# Patient Record
Sex: MALE | Race: BLACK OR AFRICAN AMERICAN | NOT HISPANIC OR LATINO | Employment: OTHER | URBAN - METROPOLITAN AREA
[De-identification: names, ages, dates, MRNs, and addresses within clinical notes are randomized per-mention and may not be internally consistent; named-entity substitution may affect disease eponyms.]

---

## 2020-07-08 ENCOUNTER — BMR PREADMISSION ASSESSMENT (OUTPATIENT)
Dept: ADMISSIONS | Facility: REHABILITATION | Age: 70
End: 2020-07-08

## 2020-07-09 PROBLEM — M48.00 SPINAL STENOSIS: Status: ACTIVE | Noted: 2020-07-09

## 2020-07-09 PROBLEM — M50.30 DDD (DEGENERATIVE DISC DISEASE), CERVICAL: Status: ACTIVE | Noted: 2020-07-09

## 2020-07-09 PROBLEM — M19.90 DJD (DEGENERATIVE JOINT DISEASE): Status: ACTIVE | Noted: 2020-07-09

## 2020-07-09 PROBLEM — I10 HTN (HYPERTENSION): Status: ACTIVE | Noted: 2020-07-09

## 2020-07-09 PROBLEM — G47.33 OSA (OBSTRUCTIVE SLEEP APNEA): Status: ACTIVE | Noted: 2020-07-09

## 2020-07-09 LAB
BUN SERPL-MCNC: 14 MG/DL
CHLORIDE SERPL-SCNC: 108 MEQ/L
CO2 SERPL-SCNC: 25 MEQ/L
CREAT SERPL-MCNC: 1 MG/DL
EXTERNAL HEMATOCRIT: 31 %
EXTERNAL HEMOGLOBIN: 10.9 G/DL
EXTERNAL PLATELET COUNT: 166 K/ΜL
EXTERNAL PT (PROTIME): 14
EXTERNAL WBC: 11.4 G/DL
GLUCOSE SERPL-MCNC: 122 MG/DL
INR PPP: 1.2
POTASSIUM SERPL-SCNC: 4.6 MEQ/L
SODIUM SERPL-SCNC: 140 MEQ/L

## 2020-07-09 RX ORDER — HYDROMORPHONE HYDROCHLORIDE 2 MG/1
2 TABLET ORAL EVERY 4 HOURS PRN
COMMUNITY
End: 2020-07-24 | Stop reason: HOSPADM

## 2020-07-09 RX ORDER — CYCLOBENZAPRINE HCL 10 MG
10 TABLET ORAL 3 TIMES DAILY PRN
COMMUNITY
End: 2020-07-24 | Stop reason: HOSPADM

## 2020-07-09 RX ORDER — DOCUSATE SODIUM 100 MG/1
100 CAPSULE, LIQUID FILLED ORAL 2 TIMES DAILY
COMMUNITY
End: 2020-07-24 | Stop reason: HOSPADM

## 2020-07-09 RX ORDER — TAMSULOSIN HYDROCHLORIDE 0.4 MG/1
0.4 CAPSULE ORAL NIGHTLY
COMMUNITY

## 2020-07-09 RX ORDER — GABAPENTIN 100 MG/1
200 CAPSULE ORAL 3 TIMES DAILY
COMMUNITY
End: 2020-07-24 | Stop reason: HOSPADM

## 2020-07-09 RX ORDER — HEPARIN SODIUM 5000 [USP'U]/ML
INJECTION, SOLUTION INTRAVENOUS; SUBCUTANEOUS
COMMUNITY
End: 2020-07-24 | Stop reason: HOSPADM

## 2020-07-09 RX ORDER — ACETAMINOPHEN 500 MG
1000 TABLET ORAL EVERY 6 HOURS PRN
COMMUNITY
End: 2020-07-24 | Stop reason: HOSPADM

## 2020-07-09 RX ORDER — POLYETHYLENE GLYCOL 3350 17 G/17G
17 POWDER, FOR SOLUTION ORAL DAILY
COMMUNITY
End: 2020-07-24 | Stop reason: HOSPADM

## 2020-07-09 RX ORDER — ROSUVASTATIN CALCIUM 5 MG/1
5 TABLET, COATED ORAL DAILY
COMMUNITY

## 2020-07-09 RX ORDER — BISACODYL 10 MG/1
10 SUPPOSITORY RECTAL DAILY
COMMUNITY
End: 2020-07-24 | Stop reason: HOSPADM

## 2020-07-09 NOTE — HOSPITAL COURSE
Jael is a 70 y.o. male 3 Days Post-Op s/p 7/6 L3-5 posterior lumbar decompression, L4-5 posterior lumbar fusion with instrumentation    Medications      Pain -  Tylenol, Dilaudid 2-4mg q4H, Flexeril PRN    Bowel -   Colace, Miralax PRN    DVT ppx -  SCDs; continue prophylactic SQH while inpatient    GI ppx -  ---    Antibiotics -  Vancomycin x 24hr    Transfusion -  ---    Activity  Weight bearing as tolerated with LSO brace (OK for OOB prior to LSO brace   arrival)                [facility]  Appreciate hospitalist recs   F/u lab   + 2x Juan Jose drain (#1: 120 last 24hr; #2: 180cc last 24hr)         #BEN on CPAP   - prescribed CPAP at home, although not using currently   - monitor for apneas and desats at night   - O2 sats were okay last night   - if O2 sats become an issue place on CPAP at night     prostate ca w/ urinary urgency   - follows w/ urology at [place]   - continue home tamsulosin   HLD   - continue home rosuvastatin     s/p lumbar decompression and fusion   - dvt ppx and pain management per ortho        Deep drain removed Sun 7/12   Superficial drain still in (35cc last 12 hr)   Appreciate Gen Surg recs -- no SBO, continue aggressive bowel regimen   Appreciate PM&R rec for acute rehab   Continue aggressive bowel regimen    Disposition  Pending Pain / PT / drain   Possibly remove drain later today then d/c to acute rehab        He is currently AAO X 3 part with therapy rec for acute rehab ready today post drain removal

## 2020-07-10 LAB
BUN SERPL-MCNC: 15 MG/DL
CHLORIDE SERPL-SCNC: 9 MEQ/L
CO2 SERPL-SCNC: 26 MEQ/L
CREAT SERPL-MCNC: 0.9 MG/DL
EXTERNAL HEMATOCRIT: 29 %
EXTERNAL HEMOGLOBIN: 10.1 G/DL
EXTERNAL PLATELET COUNT: 213 K/ΜL
EXTERNAL WBC: 8.3 G/DL
GLUCOSE SERPL-MCNC: 118 MG/DL
POTASSIUM SERPL-SCNC: 3.8 MEQ/L
SODIUM SERPL-SCNC: 141 MEQ/L

## 2020-07-13 VITALS
WEIGHT: 197 LBS | HEIGHT: 71 IN | HEART RATE: 69 BPM | DIASTOLIC BLOOD PRESSURE: 75 MMHG | BODY MASS INDEX: 27.58 KG/M2 | SYSTOLIC BLOOD PRESSURE: 158 MMHG | TEMPERATURE: 98.4 F | OXYGEN SATURATION: 96 %

## 2020-07-13 RX ORDER — ALUMINUM HYDROXIDE, MAGNESIUM HYDROXIDE, AND SIMETHICONE 1200; 120; 1200 MG/30ML; MG/30ML; MG/30ML
SUSPENSION ORAL EVERY 6 HOURS PRN
COMMUNITY
End: 2020-07-24 | Stop reason: HOSPADM

## 2020-07-13 RX ORDER — ALBUTEROL SULFATE 90 UG/1
2 INHALANT RESPIRATORY (INHALATION) EVERY 6 HOURS PRN
COMMUNITY
End: 2020-07-24 | Stop reason: HOSPADM

## 2020-07-14 ENCOUNTER — HOSPITAL ENCOUNTER (INPATIENT)
Facility: REHABILITATION | Age: 70
LOS: 10 days | Discharge: HOME HEALTH | DRG: 560 | End: 2020-07-24
Attending: PHYSICAL MEDICINE & REHABILITATION | Admitting: PHYSICAL MEDICINE & REHABILITATION
Payer: MEDICARE

## 2020-07-14 DIAGNOSIS — F43.23 ADJUSTMENT DISORDER WITH MIXED ANXIETY AND DEPRESSED MOOD: ICD-10-CM

## 2020-07-14 DIAGNOSIS — R00.0 TACHYCARDIA: ICD-10-CM

## 2020-07-14 DIAGNOSIS — R11.0 NAUSEA: Primary | ICD-10-CM

## 2020-07-14 PROBLEM — Z98.1 S/P LUMBAR SPINAL FUSION: Status: ACTIVE | Noted: 2020-07-14

## 2020-07-14 PROBLEM — N40.1 BENIGN PROSTATIC HYPERPLASIA WITH LOWER URINARY TRACT SYMPTOMS: Chronic | Status: ACTIVE | Noted: 2020-07-14

## 2020-07-14 PROCEDURE — 63700000 HC SELF-ADMINISTRABLE DRUG: Performed by: HOSPITALIST

## 2020-07-14 PROCEDURE — 12800001 HC ROOM AND CARE SEMIPRIVATE REHAB-BRAIN INJ

## 2020-07-14 PROCEDURE — 63600000 HC DRUGS/DETAIL CODE: Performed by: HOSPITALIST

## 2020-07-14 RX ORDER — CYCLOBENZAPRINE HCL 10 MG
10 TABLET ORAL 3 TIMES DAILY PRN
Status: DISCONTINUED | OUTPATIENT
Start: 2020-07-14 | End: 2020-07-24 | Stop reason: HOSPADM

## 2020-07-14 RX ORDER — ACETAMINOPHEN 325 MG/1
650 TABLET ORAL EVERY 4 HOURS PRN
Status: DISCONTINUED | OUTPATIENT
Start: 2020-07-14 | End: 2020-07-24 | Stop reason: HOSPADM

## 2020-07-14 RX ORDER — GABAPENTIN 100 MG/1
200 CAPSULE ORAL 3 TIMES DAILY
Status: DISCONTINUED | OUTPATIENT
Start: 2020-07-14 | End: 2020-07-14

## 2020-07-14 RX ORDER — TAMSULOSIN HYDROCHLORIDE 0.4 MG/1
0.4 CAPSULE ORAL NIGHTLY
Status: DISCONTINUED | OUTPATIENT
Start: 2020-07-14 | End: 2020-07-24 | Stop reason: HOSPADM

## 2020-07-14 RX ORDER — PANTOPRAZOLE SODIUM 40 MG/1
40 TABLET, DELAYED RELEASE ORAL
Status: DISCONTINUED | OUTPATIENT
Start: 2020-07-15 | End: 2020-07-24 | Stop reason: HOSPADM

## 2020-07-14 RX ORDER — POLYETHYLENE GLYCOL 3350 17 G/17G
17 POWDER, FOR SOLUTION ORAL DAILY PRN
Status: DISCONTINUED | OUTPATIENT
Start: 2020-07-14 | End: 2020-07-24 | Stop reason: HOSPADM

## 2020-07-14 RX ORDER — LOSARTAN POTASSIUM 50 MG/1
50 TABLET ORAL DAILY
COMMUNITY

## 2020-07-14 RX ORDER — POLYETHYLENE GLYCOL 3350 17 G/17G
17 POWDER, FOR SOLUTION ORAL DAILY
Status: DISCONTINUED | OUTPATIENT
Start: 2020-07-15 | End: 2020-07-23

## 2020-07-14 RX ORDER — CALCIUM CARBONATE 200(500)MG
1000 TABLET,CHEWABLE ORAL 3 TIMES DAILY PRN
Status: DISCONTINUED | OUTPATIENT
Start: 2020-07-14 | End: 2020-07-24 | Stop reason: HOSPADM

## 2020-07-14 RX ORDER — ALBUTEROL SULFATE 90 UG/1
2 INHALANT RESPIRATORY (INHALATION) EVERY 6 HOURS PRN
Status: DISCONTINUED | OUTPATIENT
Start: 2020-07-14 | End: 2020-07-24 | Stop reason: HOSPADM

## 2020-07-14 RX ORDER — BISACODYL 10 MG/1
10 SUPPOSITORY RECTAL DAILY PRN
Status: DISCONTINUED | OUTPATIENT
Start: 2020-07-14 | End: 2020-07-24 | Stop reason: HOSPADM

## 2020-07-14 RX ORDER — ROSUVASTATIN CALCIUM 5 MG/1
5 TABLET, COATED ORAL DAILY
Status: DISCONTINUED | OUTPATIENT
Start: 2020-07-15 | End: 2020-07-24 | Stop reason: HOSPADM

## 2020-07-14 RX ORDER — LOSARTAN POTASSIUM 50 MG/1
50 TABLET ORAL DAILY
Status: DISCONTINUED | OUTPATIENT
Start: 2020-07-15 | End: 2020-07-24 | Stop reason: HOSPADM

## 2020-07-14 RX ORDER — ACETAMINOPHEN 500 MG
1000 TABLET ORAL EVERY 8 HOURS
Status: DISCONTINUED | OUTPATIENT
Start: 2020-07-14 | End: 2020-07-14

## 2020-07-14 RX ORDER — HEPARIN SODIUM 5000 [USP'U]/ML
5000 INJECTION, SOLUTION INTRAVENOUS; SUBCUTANEOUS EVERY 8 HOURS
Status: DISCONTINUED | OUTPATIENT
Start: 2020-07-14 | End: 2020-07-24 | Stop reason: HOSPADM

## 2020-07-14 RX ORDER — CHOLECALCIFEROL (VITAMIN D3) 25 MCG
1000 TABLET ORAL DAILY
Status: DISCONTINUED | OUTPATIENT
Start: 2020-07-15 | End: 2020-07-24 | Stop reason: HOSPADM

## 2020-07-14 RX ORDER — AMOXICILLIN 250 MG
1 CAPSULE ORAL 2 TIMES DAILY
Status: DISCONTINUED | OUTPATIENT
Start: 2020-07-14 | End: 2020-07-23

## 2020-07-14 RX ORDER — FAMOTIDINE 20 MG/1
20 TABLET, FILM COATED ORAL 2 TIMES DAILY
Status: DISCONTINUED | OUTPATIENT
Start: 2020-07-14 | End: 2020-07-24 | Stop reason: HOSPADM

## 2020-07-14 RX ORDER — HYDROMORPHONE HYDROCHLORIDE 2 MG/1
2-4 TABLET ORAL EVERY 4 HOURS PRN
Status: DISCONTINUED | OUTPATIENT
Start: 2020-07-14 | End: 2020-07-24 | Stop reason: HOSPADM

## 2020-07-14 RX ORDER — ALUMINUM HYDROXIDE, MAGNESIUM HYDROXIDE, AND SIMETHICONE 1200; 120; 1200 MG/30ML; MG/30ML; MG/30ML
30 SUSPENSION ORAL EVERY 6 HOURS PRN
Status: DISCONTINUED | OUTPATIENT
Start: 2020-07-14 | End: 2020-07-24 | Stop reason: HOSPADM

## 2020-07-14 RX ADMIN — HEPARIN SODIUM 5000 UNITS: 5000 INJECTION INTRAVENOUS; SUBCUTANEOUS at 21:35

## 2020-07-14 RX ADMIN — TAMSULOSIN HYDROCHLORIDE 0.4 MG: 0.4 CAPSULE ORAL at 21:34

## 2020-07-14 RX ADMIN — FAMOTIDINE 20 MG: 20 TABLET ORAL at 21:34

## 2020-07-14 RX ADMIN — HYDROMORPHONE HYDROCHLORIDE 4 MG: 2 TABLET ORAL at 21:34

## 2020-07-14 ASSESSMENT — COGNITIVE AND FUNCTIONAL STATUS - GENERAL: DO YOU HAVE SERIOUS DIFFICULTY WALKING OR CLIMBING STAIRS: AMBULATION DIFFICULTY, ASSISTANCE 1 PERSON

## 2020-07-14 NOTE — BMR PREADMISSION NOTE
[facility]  [facility] Assessment    Patient Name: Jael Herman  YOB: 1950    Referral Date: 07/09/20  Evaluation Date: 07/14/20  Referring Facility Admission Date: 07/06/20  Referring Facility: [facility]   ReferringProvider: Gregorio Lancaster MD     Reason for Referral: Jael Herman is a 70 y.o. female whose primary indication for inpatient rehabilitation is Spinal Cord, Non-Traumatic.   Pertinent History of Current Functional Problem:  Jael is a 70 y.o. male 3 Days Post-Op s/p 7/6 L3-5 posterior lumbar decompression, L4-5 posterior lumbar fusion with instrumentation    Medications      Pain -  Tylenol, Dilaudid 2-4mg q4H, Flexeril PRN    Bowel -   Colace, Miralax PRN    DVT ppx -  SCDs; continue prophylactic SQH while inpatient    GI ppx -  ---    Antibiotics -  Vancomycin x 24hr    Transfusion -  ---    Activity  Weight bearing as tolerated with LSO brace (OK for OOB prior to LSO brace   arrival)                Misc  Appreciate hospitalist recs   F/u lab   + 2x Juan Jose drain (#1: 120 last 24hr; #2: 180cc last 24hr)         #BEN on CPAP   - prescribed CPAP at home, although not using currently   - monitor for apneas and desats at night   - O2 sats were okay last night   - if O2 sats become an issue place on CPAP at night     prostate ca w/ urinary urgency   - follows w/ urology at [place]   - continue home tamsulosin   HLD   - continue home rosuvastatin     s/p lumbar decompression and fusion   - dvt ppx and pain management per ortho        Deep drain removed Sun 7/12   Superficial drain still in (35cc last 12 hr)   Appreciate Gen Surg recs -- no SBO, continue aggressive bowel regimen   Appreciate PM&R rec for acute rehab   Continue aggressive bowel regimen    Disposition  Pending Pain / PT / drain   Possibly remove drain later today then d/c to acute rehab        He is currently AAO X 3 part with therapy rec for acute rehab ready today post drain  removal       Active Medical Conditions:  Patient Active Problem List   Diagnosis   • Spinal stenosis   • HTN (hypertension)   • DJD (degenerative joint disease)   • DDD (degenerative disc disease), cervical   • BEN (obstructive sleep apnea)       Active Medications:  Active Medications   Medication Sig Dispense Refill   • acetaminophen (TYLENOL) 500 mg tablet Take 1,000 mg by mouth every 6 (six) hours as needed for mild pain.     • albuterol HFA (VENTOLIN HFA) 90 mcg/actuation inhaler Inhale 2 puffs every 6 (six) hours as needed for wheezing.     • alum-mag hydroxide-simeth (MAALOX) 200-200-20 mg/5 mL suspension Take by mouth every 6 (six) hours as needed for heartburn.     • bisacodyL (DULCOLAX) 10 mg suppository Insert 10 mg into the rectum daily.     • cyclobenzaprine (FLEXERIL) 10 mg tablet Take 10 mg by mouth 3 (three) times a day as needed for muscle spasms.     • docusate sodium (COLACE) 100 mg capsule Take 100 mg by mouth 2 (two) times a day.     • gabapentin (NEURONTIN) 100 mg capsule Take 200 mg by mouth 3 (three) times a day.     • heparin sodium,porcine (HEPARIN, PORCINE,) 5,000 unit/mL syringe Inject as directed.     • HYDROmorphone (DILAUDID) 2 mg tablet Take 2 mg by mouth every 4 (four) hours as needed for moderate pain.     • ipratropium-albuteroL (COMBIVENT RESPIMAT)  mcg/actuation inhaler Inhale 1 puff 4 (four) times a day.     • polyethylene glycol (MIRALAX) 17 gram packet Take 17 g by mouth daily.     • rosuvastatin (CRESTOR) 5 mg tablet Take 5 mg by mouth daily.     • tamsulosin (FLOMAX) 0.4 mg capsule Take 0.4 mg by mouth nightly.     No medication comments found.    HISTORY:    Past Medical History:   Diagnosis Date   • DJD (degenerative joint disease)    • Hypertension    • BEN (obstructive sleep apnea)      No past surgical history on file.  Tobacco Use as of 7/8/2020     Smoking Status Smoking Start Date Smoking Quit Date Packs/Day Years Used    Never Assessed -- -- -- --    Types  Comments Smokeless Tobacco Status Smokeless Tobacco Quit Date Source    -- -- Unknown -- --             Allergies  Allergies   Allergen Reactions   • Amoxicillin    • Iodinated Contrast Media    • Simvastatin    • Tree Pollen-Luci             Premorbid Functional Status:   Dominant Hand: right  Ambulation: independent  Transferring: independent  Toileting: independent  Bathing: independent  Dressing: independent  Eating: independent  Communication: understands/communicates without difficulty  Swallowing: swallows foods/liquids without difficulty  Baseline Diet/Method of Nutritional Intake: thin liquids;regular solids  Equipment Currently Used at Home: none  Prior Level of Function Comment: brice has h/o drop foot on the rle            Living Environment:  Lives With: spouse  Name(s) of Who Lives With Patient: Renata [phone]  Living Arrangements: house  Home Accessibility: stair glide  Number of Stairs, Main Entrance: 0  Number of Stairs, Within Home, Primary: 4      TEST RESULTS:     Chemistry (Up to last 3 results from the past 720 hours)      07/09 07/10    Sodium       140          141       Potassium       4.6          3.8       BUN       14          15       Creatinine       1.0          0.9       Glucose       122          118       CO2       25          26       Chloride       108          9         Hepatic (Up to last 3 results from the past 720 hours)    None      Metabolic (Up to last 3 results from the past 720 hours)    None      Hematologic (Up to last 3 results from the past 720 hours)      07/09 07/10    WBC       11.4          8.3       Hemoglobin       10.9          10.1       Hematocrit       31          29       Platelets       166          213       Protime       14.0          --       INR       1.2          --         Other (Up to last 3 results from the past 720 hours)      07/09    INR       1.2            Diagnostics  Diagnostics: X-ray  X-ray Date: 07/13/20  X-ray Result: Contrast  has progressed to the colon. No evidence of bowel obstruction    ASSESSMENT:       Vitals:           Lines/Drains/Airways:   Lines, Drains & Airways  Lines, Drains & Airways: Drain  Drain Insertion Date: 07/06/20  Drain Comments: 1 ellis drain   Incision Date: 07/06/20  Incision Comments: c/d/istaples         Risk for Clinical Complications:  DVT: Moderate  Falls: Moderate         Precautions:  No data recorded         Current Diet:   Diet: thin liquids, regular solids         Current Functional Status:  Preadmission Current Function     Row Name 07/14/20 1100       Cognition/Psychosocial    Comment, Cognition  wnl       Motor Speech    Comment, Motor Speech Assessment  wnl       Auditory Comprehension    Comment, Assessment (Auditory Comprehension)  wnl       Verbal Expression    Comment, Assesment (Verbal Expression)  wnl       Swallowing Recommendations    Comment, Swallowing Recommendations  reg /thins       Basic Activities of Daily Living (BADLs)    Basic Activities of Daily Living  -- Min a UB and Mod a LB dressing & grooming       Bed Mobility    Comment (Bed Mobility)  min a x1       Transfers    Comment  MIn a X 1 with rw        Bed to Chair Transfer    Comment  Min a X 1 RW        Chair to Bed Transfer    Comment  Min a X 1 rw        Sit to Stand Transfer    Comment  Min a X 1 RW & HHA        Stand to Sit Transfer    Comment  Min a X 1 HHA        Gait Training    Comment  min a X 1 with RW X 1 amb 10 ft X 2 decreased gait speed, decreased step length, decrreased nba               Support System:   Designated Primary Caregiver: wife  Availability, Primary Caregiver: available most of time, some coverage needed  Health, Primary Caregiver: other (see comments) (wife has MS)  Physical Limitations, Primary Caregiver: can supervise activity and provide some assistance  Caregiver Engagement: advocates for the patient           Patient/Family Goals:   Patient's Goals For Discharge: return to all previous  roles/activities  Family Goals For Discharge: patient able to return to all previous activities/roles           Educational Background:      RECOMMENDATIONS / PLAN:       Special Needs:            Plan:  Identified Referral Needs: occupational therapy, physical therapy  OT Frequency: 5-7 times per week  OT Intensity: 1.5 hours  PT Frequency: 5-7 times per week  PT Intensity: 1.5 hours  Impairments to be addressed: safety, self-care, mobility    Medical Necessity Admission Criteria: other active medical conditions (see comments)(ddd, htn, iglesia)  Therapy Intensity: Requires, can tolerate and will benefit from 3 hours of therapy at least 5 days per week  Projected Length of Stay (days): 10 days  Patient is willing to participate in rehab program: yes         Expected Level of Function at Discharge:  Expected Functional Improvement: safety;self-care;mobility  Self-Care: Independent  Sphincter Control: Independent  Transfers: Independent  Locomotion: Independent  Communication: Independent  Social Cognition: Independent           Post-Discharge Needs:  Anticipated Discharge Disposition: home with outpatient services  Type of Outpatient Services: physical therapy

## 2020-07-14 NOTE — H&P
Patient Name: Jael Herman  MRN: [id number]  : 1950  Admission Date: 2020    Chief Complaint:    Dysfunction in ambulation, transfers and self-care status post posterior lumbar decompression and fusion, lumbar stenosis, lumbar radiculopathy. (Patient was admitted to [facility] on 20. Records reviewed on 20. Patient evaluated on 20 at about 6:45 PM. Full H&P done on 20. Orders put in CPOE on 20. Plan of care discussed with patient.)    History of Present Illness:    Mr. Jael Herman  is a 70-year-old right-handed -American gentleman with chronic conditions significant for hypertension, hyperlipidemia, benign process hypertrophy, sleep apnea, lumbar stenosis who had chronic low back pain for over 20 years and pain extending down his lower extremities especially right lower extremity with progressive weakness in his right lower extremity over the past year and he says he was noted to have a right foot drop.  He was diagnosed with lumbar spinal stenosis and underwent L3-5 PLDF by Dr. Gregorio Lancaster on 20 at [facility].  Postoperatively he is allowed weightbearing as tolerated on both lower extremities.  He was recommended a LSO brace for use when out of bed.  He did have urinary frequency postoperatively.  Drains were removed on 20.  Postoperative course was significant for abdominal pain and he reports multiple imaging studies were done of his abdomen and he also had barium follow-through.  He was seen by surgical team for his abdominal pain.  He reports abdominal x-rays were negative for obstruction.  Amylase, lipase were not elevated.  He says that when he takes Gabapentin he gets abdominal pain and he noticed that even before his surgery.  We will hold that for now.  Also he will be kept on Protonix/Pepcid.  He is on subcutaneous Heparin for DVT prophylaxis.  He is also on Dilaudid and Flexeril for pain control. He  has been kept on bowel medications for his constipation issues.  He still reports ongoing abdominal pain and I mentioned to him that we will get another x-ray of his abdomen tomorrow.  If his abdominal pain gets worse, he may need to go to the ER for evaluation. On 7/10/20, hemoglobin was 10.1, WBC count 8.3, BUN 15, creatinine 0.9.  He has been needing assistance for mobility, transfers, self-care postoperatively. I have reviewed the preadmission screening and concur with that information and there are no significant changes from patient’s preadmission screening. He is transferred to [place] [facility] on 7/14/20 for further rehabilitation care.       Past Medical History:    Past Medical History:   Diagnosis Date   • BPH (benign prostatic hyperplasia)    • DJD (degenerative joint disease)    • Hypertension    • Lumbar stenosis    • BEN (obstructive sleep apnea)        Past Surgical History:    Past Surgical History:   Procedure Laterality Date   • POSTERIOR FUSION LUMBAR SPINE  07/06/2020    L3-5 PLDF       Medications:    Current Facility-Administered Medications   Medication Dose Route Frequency   • acetaminophen  650 mg oral q4h PRN   • albuterol HFA  2 puff inhalation q6h PRN   • alum-mag hydroxide-simeth  30 mL oral q6h PRN   • bisacodyL  10 mg rectal Daily PRN   • calcium carbonate  1,000 mg oral 3x daily PRN   • [START ON 7/15/2020] cholecalciferol (vitamin D3)  1,000 Units oral Daily   • cyclobenzaprine  10 mg oral 3x daily PRN   • famotidine  20 mg oral BID   • heparin (porcine)  5,000 Units subcutaneous q8h CARRIE   • HYDROmorphone  2-4 mg oral q4h PRN   • [START ON 7/15/2020] losartan  50 mg oral Daily   • [START ON 7/15/2020] pantoprazole  40 mg oral Daily before breakfast   • [START ON 7/15/2020] polyethylene glycol  17 g oral Daily   • polyethylene glycol  17 g oral Daily PRN   • [START ON 7/15/2020] rosuvastatin  5 mg oral Daily   • sennosides-docusate sodium  1 tablet oral BID   • tamsulosin   "0.4 mg oral Nightly       Allergies:    Allergies   Allergen Reactions   • Amoxicillin    • Iodinated Contrast Media    • Simvastatin    • Tree Pollen-Luci        Family History:    History reviewed. No pertinent family history.    Social History:    The patient is  and lives with his wife in a 2 level home. 3 steps to enter, bedroom and bathroom on the second level, but he says he has a first floor setup available.  He is retired and was working as a counselor.  He denies smoking, or drinking alcohol and denies using recreational drugs.    Functional History:    The patient is a right-hand dominant person. He was independent in ADLs and ambulation prior to his hospitalization. He drives a car.    Review Of Systems:    A complete and comprehensive review of systems was performed. The patient denies any chest pain, shortness of breath. He indicates last bowel movement was today.  He does have difficulty with sleep.  He has had some abdominal pain since his surgery.  Multiple imaging studies were done to his abdomen and he was followed by the surgical team in the acute care hospital.  He still reports some abdominal pain and we will get x-ray tomorrow.  He has some pain in his back.  He has right ankle dorsiflexor weakness.  He was given a back brace for use when out of bed.  I discussed spinal precautions with him.  Otherwise, a complete and comprehensive review of systems is negative      Physical Examination:    Pulse (!) 51, temperature 36.7 °C (98 °F), temperature source Oral, resp. rate 18, height 1.803 m (5' 11\"), weight 81.6 kg (180 lb), SpO2 99 %.    Body mass index is 25.1 kg/m².    General Appearance: Not in acute distress  Head/Ear/Nose/Throat: Normocephalic; Atraumatic.   Eye: EOMI; PERRL.   Neck: No JVD; No Bruits.   Respiratory: Decreased breath sounds at bases.   Cardiovascular: RRR; Normal S1, S2.   Gastrointestinal: Soft; NT; +BS.   Extremities: Bilateral lower extremity edema noted.  " Incision lumbar spine covered by dressing.  Musculoskeletal: Functional active range of motion in both upper extremities.  He is able to do active straight leg raise in both lower extremities but has weakness in right ankle dorsiflexion and he says he has a known right foot drop.  Neurological: AAO ×3. Speech is fluent. Cranial nerve examination does not reveal any gross facial asymmetry. Strength testing about 4+/5 strength in both upper extremities.  Bilateral hip flexion is 4/5.  Bilateral quadriceps are 4/5.  Right ankle dorsiflexion is 2+/5.  Right ankle plantar flexion is 4+/5.  Left ankle dorsi and plantar flexion of 4+/5.  He is grossly able to localize touch and position sense in great toes but has some numbness in his legs and feet.  Deep tendon reflexes are hypoactive and symmetric bilaterally.  Plantars are flexor.  Coordination is functional upper extremities.    Behavior/Emotional: Appropriate; Cooperative.   Skin: No obvious rashes noted.  Incision lumbar spine covered with dressing.      Assessment and Plan:    ASSESSMENT PLAN:  1. 70-year-old right-handed -American gentleman with chronic conditions significant for hypertension, hyperlipidemia, benign process hypertrophy, sleep apnea, lumbar stenosis who had chronic low back pain for over 20 years and pain extending down his lower extremities especially right lower extremity with progressive weakness in his right lower extremity over the past year and he says he was noted to have a right foot drop.  He was diagnosed with lumbar spinal stenosis and underwent L3-5 PLDF by Dr. Gregorio Lancaster on 7/6/20 at [facility].  Postoperatively he is allowed weightbearing as tolerated on both lower extremities.  He was recommended a LSO brace for use when out of bed.  He did have urinary frequency postoperatively.  Drains were removed on 7/12/20.  Postoperative course was significant for abdominal pain and he reports multiple imaging  studies were done of his abdomen and he also had barium follow-through.  He was seen by surgical team for his abdominal pain.  He reports abdominal x-rays were negative for obstruction.  Amylase, lipase were not elevated.  He says that when he takes Gabapentin he gets abdominal pain and he noticed that even before his surgery.  We will hold that for now.  Also he will be kept on Protonix/Pepcid.  He is on subcutaneous Heparin for DVT prophylaxis.  He is also on Dilaudid and Flexeril for pain control. He has been kept on bowel medications for his constipation issues.  He still reports ongoing abdominal pain and I mentioned to him that we will get another x-ray of his abdomen tomorrow.  If his abdominal pain gets worse, he may need to go to the ER for evaluation. On 7/10/20, hemoglobin was 10.1, WBC count 8.3, BUN 15, creatinine 0.9.  He has been needing assistance for mobility, transfers, self-care postoperatively. He is transferred to [facility] on 7/14/20 for further rehabilitation care.    2. DVT prophylaxis - on Heparin.  On SCDs.    3.  Lumbar stenosis - status post posterior lumbar decompression and fusion, lumbar stenosis, lumbar radiculopathy - continue PT, OT, psychology.  Monitor healing of lumbar incision.  He has chronic right foot drop.    4. GI - On Protonix for GI prophylaxis. On Pepcid.  MiraLAX, Senokot-S.  On PRN Dulcolax.  On PRN Maalox.  On Tums when necessary.  On PRN MiraLAX.  We will get abdominal x-ray tomorrow due to ongoing abdominal pain.    5.  - voiding.  Denies dysuria.  Monitor post void residuals.  He has some urinary frequency.  On Flomax.    6.  Hypertension -  on Cozaar.    7. Pain - on when necessary Dilaudid.  On Flexeril PRN.  On Tylenol PRN.  Gabapentin was discontinued as patient says it causes abdominal pain for him.  He noticed this even prior to his surgery per patient.    8. Hematology - monitor hemoglobin, platelets.      9.  Hyperlipidemia - on Crestor.      10. F/E/N - on vitamin D.    11. Rehabilitation medicine - Continue comprehensive rehabilitation care. Continue PT, OT, speech, psychology.  We will follow falls precautions, cardiac precautions, monitor pulse oximetry in therapy and follow aspiration precautions.  We will follow spinal precautions.    12. Reviewed labs today.    13. Consultations - Dr. Lyles will be consulted from internal medicine standpoint.  Nutrition will be consulted.  Will consult psychology.      Estimated Length Of Stay:    Will be about 10 days, and will be adjusted in team meetings depending upon functional status and progress in therapy.     Goals Of This Stay:    Goals of this stay would be to increase his strength; improve his endurance; maximize his independence in transfers, ambulation, self care, keeping him weight-bearing as tolerated on both lower extremities; evaluate the need for assistive devices and adaptive equipment; do patient and family education; do caregiver training as appropriate; monitor him medically, monitor healing of his incision, teach in spinal precautions, evaluate bracing needs if any and try to control his pain.       Post Admission Physician Evaluation:    Jael Herman is admitted to [facility] for comprehensive inpatient rehabilitation for Spinal Cord, Non-Traumatic status post posterior lumbar decompression and fusion, lumbar stenosis, lumbar radiculopathy with functional deficits in safety;self-care;mobility. Patient is receiving the following services: occupational therapy;physical therapy, Rehabilitation nursing care, Case management evaluation, recreation therapy, psychology services, medical management    Active medical management is required for   Patient Active Problem List   Diagnosis   • Spinal stenosis   • HTN (hypertension)   • DJD (degenerative joint disease)   • DDD (degenerative disc disease), cervical   • BEN (obstructive sleep apnea)   • S/P lumbar  spinal fusion   • Benign prostatic hyperplasia with lower urinary tract symptoms       Premorbid Function  Dominant Hand: right  Ambulation: independent  Transferring: independent  Toileting: independent  Bathing: independent  Dressing: independent  Eating: independent  Communication: understands/communicates without difficulty  Swallowing: swallows foods/liquids without difficulty  Baseline Diet/Method of Nutritional Intake: thin liquids;regular solids  Equipment Currently Used at Home: none  Prior Level of Function Comment: ambindep has h/o drop foot on the rle       Current Function  Gait  Comment: min a X 1 with RW X 1 amb 10 ft X 2 decreased gait speed, decreased step length, decrreased nba    Stairs     Wheelchair     Transfers  Comment (Bed Mobility): min a x1  Comment: MIn a X 1 with rw   Comment: Min a X 1 RW   Comment: Min a X 1 rw   Comment: Min a X 1 RW & HHA   Comment: Min a X 1 HHA        Self Care     Cognition  Comment, Cognition: wnl    Communication  Comment, Motor Speech Assessment: wnl  Comment, Assessment (Auditory Comprehension): wnl    Swallow  Comment, Swallowing Recommendations: reg /thins      Risk for Complications  DVT: Moderate  Falls: Moderate      Expected Level of Function  Expected Functional Improvement: safety;self-care;mobility  Self-Care: Independent  Sphincter Control: Independent  Transfers: Independent  Locomotion: Independent  Communication: Independent  Social Cognition: Independent      Anticipated Discharge Plan  Anticipated Discharge Disposition: home with outpatient services  Type of Outpatient Services: physical therapy      I have reviewed the pre-admission screening and there are no relevant changes.    Expected length of stay: 10 days      Anoop Yeh MD  7/14/2020

## 2020-07-14 NOTE — CONSULTS
[facility]  Consult Note    Subjective     Jael Herman is a 70 y.o. male who was admitted for S/P lumbar spinal fusion [Z98.1]. Patient was referred by Anoop Yeh MD for medical assessment and management     CC: S/P lumbar spinal fusion [Z98.1]    HPI: Jael Herman is a 70 y.o. male with HTN, HL, BPH, BEN (no CPAP), and lumbar stenosis who underwent L3-5 PLDF by Dr. Gregorio Lancaster 7/6/20 at [facility]. Post op he had anemia but did not require transfusion. His pain was managed with Tylenol, Neurontin, Flexeril and Dilaudid.  He was put on SQ Heparin for DVT ppx.     He remained on Losartan for HTN and Crestor for HL. He was also on Flomax for BPH.     He had some post op abdominal discomfort and nausea. Xrays negative for obstruction. Amylase and lipase were not elevated. He was treated with Senokot, Colace, Miralax and Dulcolax suppositories for constipation.     The patient was seen by PM&R and found to have residual deficits in ambulation and ADLs due to S/P lumbar spinal fusion [Z98.1] and came to [facility] on 7/14/2020 for acute inpatient rehab.     SUBJECTIVE:  Patient interviewed and examined    He still notes GI pain, xray today again without obstruction, he notes that he finds that Tylenol and Neurontin cause him abdominal discomfort and requests to discontinue    Pain stable, no new weakness or numbness, no dysuria, no chest pain, palpitations, dyspnea or fevers      ROS: as above, otherwise noncontributory  Current meds and allergies reviewed    Outside records reviewed. Pertinent radiology results reviewed. Pertinent lab results reviewed.    Medical History:   Past Medical History:   Diagnosis Date   • BPH (benign prostatic hyperplasia)    • DJD (degenerative joint disease)    • Hypertension    • Lumbar stenosis    • BEN (obstructive sleep apnea)        Surgical History:   Past Surgical History:   Procedure Laterality Date   • POSTERIOR FUSION LUMBAR SPINE   07/06/2020    L3-5 PLDF       Allergies: Amoxicillin; Iodinated contrast media; Simvastatin; and Tree pollen-meng      Current Facility-Administered Medications   Medication Dose Route Frequency   • acetaminophen  650 mg oral q4h PRN   • albuterol HFA  2 puff inhalation q6h PRN   • alum-mag hydroxide-simeth  30 mL oral q6h PRN   • bisacodyL  10 mg rectal Daily PRN   • calcium carbonate  1,000 mg oral 3x daily PRN   • [START ON 7/15/2020] cholecalciferol (vitamin D3)  1,000 Units oral Daily   • cyclobenzaprine  10 mg oral 3x daily PRN   • famotidine  20 mg oral BID   • heparin (porcine)  5,000 Units subcutaneous q8h CARRIE   • HYDROmorphone  2-4 mg oral q4h PRN   • [START ON 7/15/2020] losartan  50 mg oral Daily   • [START ON 7/15/2020] pantoprazole  40 mg oral Daily before breakfast   • [START ON 7/15/2020] polyethylene glycol  17 g oral Daily   • polyethylene glycol  17 g oral Daily PRN   • [START ON 7/15/2020] rosuvastatin  5 mg oral Daily   • sennosides-docusate sodium  1 tablet oral BID   • tamsulosin  0.4 mg oral Nightly         Social History:   Social History     Socioeconomic History   • Marital status:      Spouse name: Renata   • Number of children: 1   • Years of education: None   • Highest education level: None   Occupational History   • None   Social Needs   • Financial resource strain: None   • Food insecurity:     Worry: None     Inability: None   • Transportation needs:     Medical: None     Non-medical: None   Tobacco Use   • Smoking status: Never Smoker   • Smokeless tobacco: Never Used   Substance and Sexual Activity   • Alcohol use: Not Currently   • Drug use: Never   • Sexual activity: None   Lifestyle   • Physical activity:     Days per week: None     Minutes per session: None   • Stress: None   Relationships   • Social connections:     Talks on phone: None     Gets together: None     Attends Mu-ism service: None     Active member of club or organization: None     Attends meetings of  clubs or organizations: None     Relationship status: None   • Intimate partner violence:     Fear of current or ex partner: None     Emotionally abused: None     Physically abused: None     Forced sexual activity: None   Other Topics Concern   • None   Social History Narrative    Wife was former [employer] employee     Was inpt herself at [facility] X 2 ambulates with rw prev Transverse Myelitis        Family History: History reviewed. No pertinent family history.    Review of Systems    Constitutional: negative for fevers  Eyes: negative for visual disturbance  Ears, nose, mouth, throat, and face: negative for nasal congestion  Respiratory: negative for cough, dyspnea on exertion and wheezing  Cardiovascular: negative for chest pain and palpitations  Gastrointestinal: still with constipation and abdominal pain, no nausea  Genitourinary:negative for decreased stream and painful urination  Integument/breast: negative for rash and itching  Musculoskeletal: stable back pain  Neurological: no focal weakness  Behavioral/Psych: positive for anxiety and depression      Vital signs in last 24 hours:  Temp:  [36.7 °C (98 °F)] 36.7 °C (98 °F)  Heart Rate:  [51] 51  Resp:  [18] 18  Vital signs reviewed 07/14/20 6:34 PM    Objective     Physical Exam    General appearance: alert, appears stated age and cooperative  Head: normocephalic, without obvious abnormality, atraumatic  Eyes: conjunctivae clear. PERRL, EOM's intact.  Ears: normal external ear  Nose: Nares normal. Septum midline. Mucosa normal.  Throat: normal oropharynx  Neck: no JVD, no adenopathy, no carotid bruit, supple, symmetrical, trachea midline and thyroid not enlarged, symmetric, no tenderness/mass/nodules  Lungs: clear to auscultation bilaterally  Heart: regular rate and rhythm, S1, S2 normal, no murmur, click, rub or gallop  Abdomen: soft, non-tender; bowel sounds normal; no masses, no organomegaly  Extremities: 2+ bilat LE edema  Pulses: 2+ and symmetric all four  extremities  Skin: lumbar incision C/D/I  Lymph nodes: Cervical and supraclavicular nodes normal.  Neurologic: Alert and oriented X 3; no focal weakness        Labs  I have reviewed the patient's pertinent labs.  Significant abnormals are anemia.  7/10/20: wbc 8.3, h/h 10.1/29, plt 213  7/10/20: na 141, k 3.8, cl 106, co2 26, bun 15, cr 0.94, glu 118, ca 9.2    Imaging  Radiology reports reviewed.     7/14/20: XR abdomen:  A single AP view of the abdomen is compared to the study from the previous day. Again seen is contrast material in the colon. Intestinal gas pattern is normal. No extraluminal gas is present. The previously seen catheters overlying the abdomen are no longer identified.    7/11/20: XR lumbar spine:  Patient status post posterior intermittent fusion at L4-L5 with bilateral vertical rods and transpedicular screws as well as posterior decompression.  Continued mild dextrocurvature of the lumbar spine.  No immediate postoperative hardware complications.  Soft tissue drains overlying the lumbar spine.  Air-fluid level seen within the small bowel, correlate with abdominal exam.    ECG/Telemetry  Cardiology reports reviewed.         Assessment/Plan   ASSESSMENT/PLAN  70 y.o. male  with HTN, HL, BPH, BEN (no CPAP), and lumbar stenosis who underwent L3-5 PLDF by Dr. Gregorio Lancaster 7/6/20 at [facility]    1. Neuro:  -lumbar stenosis s/p L3-5 PLDF  -Tylenol and Dilaudid prn pain  -he requests to stop Neurontin     2. Vasc:  -bilat LE edema  -SCDs and SQ Heparin for DVT ppx  -check doppler US bilat LE's     3. Heme:  -anemia due to blood loss, no iron due to GI distress  -follow CBC     4. Renal:  -increased risk of dehydration and electrolyte changes  -follow BMP, Mg     5. Gi:  -abdominal pain since surgery, repeated xrays without obstruction, seen by general surgery  -recheck amylase and lipase  -Senokot-S and Miralax for bowels  -Protonix and Pepcid for GERD and ulcer ppx     6.  Gu:  -BPH on Flomax  -increased risk of UTI and retention  -check UA/C+S and check PVRs     7. Cardiac:  -HTN on Losartan  -watch for orthostatic hypotension  -use cardiac precautions in therapy     8. Pulm:  -BEN but no CPAP  -incentive spirometry for atelectasis     9. Derm:  -consulted [facility] for skin assessment     10. Nutrition:  -consulted Nutrition for assessment and education    11. Endo:  -HL on Crestor    12. Psych:  -consulted Psychology for support    plan discussed with patient, nurse, case management and Anoop Yeh MD    Orders personally entered in [facility]PO[facility]     Fredis Lyles MD  07/14/20  6:34 PM

## 2020-07-14 NOTE — PLAN OF CARE
Problem: Rehabilitation (IR[facility]) Plan of Care  Goal: Plan of Care Review  Outcome: Progressing  Flowsheets (Taken 7/14/2020 1920)  Plan of Care Reviewed With: patient  IRF Plan of Care Review: progress ongoing, continue  Outcome Summary: reviewed safety procedures

## 2020-07-15 ENCOUNTER — APPOINTMENT (INPATIENT)
Dept: PHYSICAL THERAPY | Facility: REHABILITATION | Age: 70
DRG: 560 | End: 2020-07-15
Payer: MEDICARE

## 2020-07-15 ENCOUNTER — APPOINTMENT (INPATIENT)
Dept: RADIOLOGY | Facility: REHABILITATION | Age: 70
DRG: 560 | End: 2020-07-15
Attending: PHYSICAL MEDICINE & REHABILITATION
Payer: MEDICARE

## 2020-07-15 ENCOUNTER — APPOINTMENT (INPATIENT)
Dept: WOUND CARE | Facility: REHABILITATION | Age: 70
DRG: 560 | End: 2020-07-15
Payer: MEDICARE

## 2020-07-15 ENCOUNTER — APPOINTMENT (INPATIENT)
Dept: OCCUPATIONAL THERAPY | Facility: REHABILITATION | Age: 70
DRG: 560 | End: 2020-07-15
Payer: MEDICARE

## 2020-07-15 ENCOUNTER — APPOINTMENT (INPATIENT)
Dept: CARDIOLOGY | Facility: REHABILITATION | Age: 70
DRG: 560 | End: 2020-07-15
Attending: HOSPITALIST
Payer: MEDICARE

## 2020-07-15 ENCOUNTER — APPOINTMENT (OUTPATIENT)
Dept: PSYCHOLOGY | Facility: CLINIC | Age: 70
End: 2020-07-15
Attending: PHYSICAL MEDICINE & REHABILITATION
Payer: MEDICARE

## 2020-07-15 LAB
ALBUMIN SERPL-MCNC: 3.5 G/DL (ref 3.4–5)
ALP SERPL-CCNC: 54 IU/L (ref 35–126)
ALT SERPL-CCNC: 44 IU/L (ref 16–63)
AMYLASE SERPL-CCNC: 112 U/L (ref 28–100)
ANION GAP SERPL CALC-SCNC: 8 MEQ/L (ref 3–15)
AST SERPL-CCNC: 26 IU/L (ref 15–41)
BASOPHILS # BLD: 0.07 K/UL (ref 0.01–0.1)
BASOPHILS NFR BLD: 0.8 %
BILIRUB SERPL-MCNC: 0.8 MG/DL (ref 0.3–1.2)
BILIRUB UR QL STRIP.AUTO: NEGATIVE MG/DL
BSA FOR ECHO PROCEDURE: 2.02 M2
BUN SERPL-MCNC: 19 MG/DL (ref 8–20)
CALCIUM SERPL-MCNC: 9.3 MG/DL (ref 8.9–10.3)
CHLORIDE SERPL-SCNC: 105 MEQ/L (ref 98–109)
CLARITY UR REFRACT.AUTO: CLEAR
CO2 SERPL-SCNC: 24 MEQ/L (ref 22–32)
COLOR UR AUTO: YELLOW
CREAT SERPL-MCNC: 1.1 MG/DL (ref 0.8–1.3)
DIFFERENTIAL METHOD BLD: ABNORMAL
EOSINOPHIL # BLD: 0.09 K/UL (ref 0.04–0.54)
EOSINOPHIL NFR BLD: 1 %
ERYTHROCYTE [DISTWIDTH] IN BLOOD BY AUTOMATED COUNT: 13.5 % (ref 11.6–14.4)
FOLATE SERPL-MCNC: 8.6 NG/ML
GFR SERPL CREATININE-BSD FRML MDRD: >60 ML/MIN/1.73M*2
GLUCOSE SERPL-MCNC: 108 MG/DL (ref 70–99)
GLUCOSE UR STRIP.AUTO-MCNC: NEGATIVE MG/DL
HCT VFR BLDCO AUTO: 35.5 % (ref 40.1–51)
HGB BLD-MCNC: 11.8 G/DL (ref 13.7–17.5)
HGB UR QL STRIP.AUTO: NEGATIVE
IMM GRANULOCYTES # BLD AUTO: 0.04 K/UL (ref 0–0.08)
IMM GRANULOCYTES NFR BLD AUTO: 0.4 %
KETONES UR STRIP.AUTO-MCNC: NEGATIVE MG/DL
LEUKOCYTE ESTERASE UR QL STRIP.AUTO: NEGATIVE
LIPASE SERPL-CCNC: 34 U/L (ref 20–51)
LYMPHOCYTES # BLD: 2.68 K/UL (ref 1.2–3.5)
LYMPHOCYTES NFR BLD: 29.8 %
MAGNESIUM SERPL-MCNC: 2.1 MG/DL (ref 1.8–2.5)
MCH RBC QN AUTO: 31.2 PG (ref 28–33.2)
MCHC RBC AUTO-ENTMCNC: 33.2 G/DL (ref 32.2–36.5)
MCV RBC AUTO: 93.9 FL (ref 83–98)
MONOCYTES # BLD: 0.92 K/UL (ref 0.3–1)
MONOCYTES NFR BLD: 10.2 %
NEUTROPHILS # BLD: 5.19 K/UL (ref 1.7–7)
NEUTS SEG NFR BLD: 57.8 %
NITRITE UR QL STRIP.AUTO: NEGATIVE
NRBC BLD-RTO: 0 %
PDW BLD AUTO: 10.3 FL (ref 9.4–12.4)
PH UR STRIP.AUTO: 6.5 [PH]
PLATELET # BLD AUTO: 385 K/UL (ref 150–350)
POTASSIUM SERPL-SCNC: 4.2 MEQ/L (ref 3.6–5.1)
PROT SERPL-MCNC: 6.8 G/DL (ref 6–8.2)
PROT UR QL STRIP.AUTO: NEGATIVE
RBC # BLD AUTO: 3.78 M/UL (ref 4.5–5.8)
SODIUM SERPL-SCNC: 137 MEQ/L (ref 136–144)
SP GR UR REFRACT.AUTO: 1.02
UROBILINOGEN UR STRIP-ACNC: 0.2 EU/DL
VIT B12 SERPL-MCNC: 112 PG/ML (ref 180–914)
WBC # BLD AUTO: 8.99 K/UL (ref 3.8–10.5)

## 2020-07-15 PROCEDURE — 82746 ASSAY OF FOLIC ACID SERUM: CPT | Performed by: HOSPITALIST

## 2020-07-15 PROCEDURE — 90791 PSYCH DIAGNOSTIC EVALUATION: CPT | Performed by: PSYCHOLOGIST

## 2020-07-15 PROCEDURE — 97530 THERAPEUTIC ACTIVITIES: CPT | Mod: GP | Performed by: PHYSICAL THERAPIST

## 2020-07-15 PROCEDURE — 82607 VITAMIN B-12: CPT | Performed by: HOSPITALIST

## 2020-07-15 PROCEDURE — 83735 ASSAY OF MAGNESIUM: CPT | Performed by: HOSPITALIST

## 2020-07-15 PROCEDURE — 97162 PT EVAL MOD COMPLEX 30 MIN: CPT | Mod: GP | Performed by: PHYSICAL THERAPIST

## 2020-07-15 PROCEDURE — 97116 GAIT TRAINING THERAPY: CPT | Mod: GP | Performed by: PHYSICAL THERAPIST

## 2020-07-15 PROCEDURE — 74022 RADEX COMPL AQT ABD SERIES: CPT

## 2020-07-15 PROCEDURE — 97166 OT EVAL MOD COMPLEX 45 MIN: CPT | Mod: GO

## 2020-07-15 PROCEDURE — 63700000 HC SELF-ADMINISTRABLE DRUG: Performed by: HOSPITALIST

## 2020-07-15 PROCEDURE — 81003 URINALYSIS AUTO W/O SCOPE: CPT | Performed by: HOSPITALIST

## 2020-07-15 PROCEDURE — 93970 EXTREMITY STUDY: CPT

## 2020-07-15 PROCEDURE — 80053 COMPREHEN METABOLIC PANEL: CPT | Performed by: PHYSICAL MEDICINE & REHABILITATION

## 2020-07-15 PROCEDURE — 85025 COMPLETE CBC W/AUTO DIFF WBC: CPT | Performed by: PHYSICAL MEDICINE & REHABILITATION

## 2020-07-15 PROCEDURE — 82150 ASSAY OF AMYLASE: CPT | Performed by: HOSPITALIST

## 2020-07-15 PROCEDURE — 12800001 HC ROOM AND CARE SEMIPRIVATE REHAB-BRAIN INJ

## 2020-07-15 PROCEDURE — 97110 THERAPEUTIC EXERCISES: CPT | Mod: GP | Performed by: PHYSICAL THERAPIST

## 2020-07-15 PROCEDURE — 97535 SELF CARE MNGMENT TRAINING: CPT | Mod: GO

## 2020-07-15 PROCEDURE — 83690 ASSAY OF LIPASE: CPT | Performed by: HOSPITALIST

## 2020-07-15 PROCEDURE — 63700000 HC SELF-ADMINISTRABLE DRUG: Performed by: PHYSICAL MEDICINE & REHABILITATION

## 2020-07-15 PROCEDURE — 36415 COLL VENOUS BLD VENIPUNCTURE: CPT | Performed by: HOSPITALIST

## 2020-07-15 PROCEDURE — 63600000 HC DRUGS/DETAIL CODE: Performed by: HOSPITALIST

## 2020-07-15 RX ORDER — ONDANSETRON 4 MG/1
4 TABLET, ORALLY DISINTEGRATING ORAL EVERY 8 HOURS PRN
Status: DISCONTINUED | OUTPATIENT
Start: 2020-07-15 | End: 2020-07-24 | Stop reason: HOSPADM

## 2020-07-15 RX ADMIN — HEPARIN SODIUM 5000 UNITS: 5000 INJECTION INTRAVENOUS; SUBCUTANEOUS at 21:15

## 2020-07-15 RX ADMIN — ROSUVASTATIN CALCIUM 5 MG: 5 TABLET, FILM COATED ORAL at 08:40

## 2020-07-15 RX ADMIN — HEPARIN SODIUM 5000 UNITS: 5000 INJECTION INTRAVENOUS; SUBCUTANEOUS at 15:04

## 2020-07-15 RX ADMIN — FAMOTIDINE 20 MG: 20 TABLET ORAL at 21:15

## 2020-07-15 RX ADMIN — HYDROMORPHONE HYDROCHLORIDE 4 MG: 2 TABLET ORAL at 12:42

## 2020-07-15 RX ADMIN — HYDROMORPHONE HYDROCHLORIDE 4 MG: 2 TABLET ORAL at 05:14

## 2020-07-15 RX ADMIN — ONDANSETRON 4 MG: 4 TABLET, ORALLY DISINTEGRATING ORAL at 05:36

## 2020-07-15 RX ADMIN — PANTOPRAZOLE SODIUM 40 MG: 40 TABLET, DELAYED RELEASE ORAL at 08:39

## 2020-07-15 RX ADMIN — POLYETHYLENE GLYCOL 3350 17 G: 17 POWDER, FOR SOLUTION ORAL at 08:40

## 2020-07-15 RX ADMIN — LOSARTAN POTASSIUM 50 MG: 50 TABLET, FILM COATED ORAL at 08:39

## 2020-07-15 RX ADMIN — FAMOTIDINE 20 MG: 20 TABLET ORAL at 08:39

## 2020-07-15 RX ADMIN — TAMSULOSIN HYDROCHLORIDE 0.4 MG: 0.4 CAPSULE ORAL at 21:15

## 2020-07-15 RX ADMIN — HEPARIN SODIUM 5000 UNITS: 5000 INJECTION INTRAVENOUS; SUBCUTANEOUS at 05:17

## 2020-07-15 RX ADMIN — SENNOSIDES AND DOCUSATE SODIUM 1 TABLET: 8.6; 5 TABLET ORAL at 21:15

## 2020-07-15 RX ADMIN — MELATONIN 1000 UNITS: at 08:38

## 2020-07-15 RX ADMIN — SENNOSIDES AND DOCUSATE SODIUM 1 TABLET: 8.6; 5 TABLET ORAL at 08:39

## 2020-07-15 ASSESSMENT — MINI MENTAL STATE EXAM
SAY:  READ THE WORDS ON THE PAGE AND THEN DO WHAT IT SAYS.  THEN HAND THE PERSON
THE SHEET WITH CLOSE YOUR EYES ON IT.  IF THE SUBJECT READS AND DOES NOT CLOSE THEIR
EYES, REPEAT UP TO THREE TIMES.  SCORE ONLY IF SUBJECT CLOSES EYES.: 1-->ABLE TO PERFORM
WHAT MONTH IS THIS?: 1-->CORRECT
WHAT CITY/TOWN ARE WE IN?: 1-->CORRECT
WHAT STATE [OR PROVINCE] ARE WE IN?: 1-->CORRECT
WHAT YEAR IS THIS?: 1-->CORRECT
SPELL THE WORD WORLD. NOW SPELL IT BACKWARDS.: 5-->DLROW
WHAT IS TODAY'S DATE?: 1-->CORRECT
NOW WHAT WERE THE THREE OBJECTS I ASKED YOU TO REMEMBER?: 2-->2 OUT OF 3 CORRECT
WHAT IS THE NAME OF THIS BUILDING [IN FACILITY]?/WHAT IS THE STREET ADDRESS OF THIS HOUSE [IN HOME]?: 1-->CORRECT
WHAT DAY OF THE WEEK IS THIS?: 1-->CORRECT
PLEASE NAME 2 OBJECTS? (EX. PEN, WATCH): 2-->NAMES THEM BOTH OBJECTS
WHICH SEASON IS THIS?: 1-->CORRECT
SAY: I WOULD LIKE YOU TO REPEAT THIS PHRASE AFTER ME: NO IFS, ANDS, OR BUTS.: 1-->ABLE TO PERFORM
NOW WHAT WERE THE THREE OBJECTS I ASKED YOU TO REMEMBER?: 3-->ALL CORRECT
SAY: PUT THE PAPER DOWN ON THE FLOOR, SCORE IF PAPER IS PLACED BACK ON FLOOR: 3-->ALL CORRECT
WHAT IS THE NAME OF THIS BUILDING [IN FACILITY]?/WHAT IS THE STREET ADDRESS OF THIS HOUSE [IN HOME]?: 1-->CORRECT

## 2020-07-15 ASSESSMENT — BALANCE ASSESSMENTS: TOTAL SCORE: 25

## 2020-07-15 NOTE — PROGRESS NOTES
Patient: Jael Herman  Location: [facility] [address]  MRN: [id number]  Today's date: 7/15/2020    History of Present Illness  Jael Herman is a 70 y.o. male whose primary indication for inpatient rehabilitation is Spinal Cord, Non-Traumatic.  Pertinent History of Current Functional Problem:  He has a history of Spinal stenosis of lumbar region with neurogenic claudication [M48.062] and was admitted for a lumbar spinal fusion. Underwent L3-5 PLDF (posterior lumbar decompression, L4-5 posterior lumbar fusion with instrumentation.  by Dr. Gregorio Lancaster 7/6/20 at [facility]. Post op he had anemia but did not require transfusion. His pain was managed with Tylenol, Neurontin, Flexeril and Dilaudid.  He was put on SQ Heparin for DVT ppx. The patient was seen by PM&R and found to have residual deficits in ambulation and ADLs due to S/P lumbar spinal fusion [Z98.1] and was transferred to [facility] on 7/14/2020 for acute inpatient rehab.      Past Medical History  Jael has a past medical history of BPH (benign prostatic hyperplasia), DJD (degenerative joint disease), Hypertension, Lumbar stenosis, and BEN (obstructive sleep apnea).        Prior Living Environment      Most Recent Value   Lives With  spouse   Living Arrangements  house   Home Accessibility  stairs to enter home (Group)   Number of Stairs, Main Entrance  4   Surface of Stairs, Main Entrance  concrete   Stair Railings, Main Entrance  railing on left side (ascending)   Landing, Stairs, Main Entrance  railings present   Stairs Comment, Main Entrance  side or front entrance   Location, Kitchen  first (main) floor   Kitchen Access Comment  pt completes IADLs at home   Location, Patient Bedroom  second floor, must negotiate stairs to access   Patient Bedroom Access Comment  has bed on first floor   Location, Bathroom  first (main) floor, second floor, must negotiate stairs to access   Bathroom Access Comment  1st  floor powder room, tub shower, wife has shower bench, renovating master bedroom closet into bathroom, standard toilets, no grab bars   Number of Stairs, Within Home, Secondary  other (see comments)   Stairs Comment, Within Home, Secondary  has stair lift for wife           07/15/20 1045   Pain/Comfort/Sleep   Pain Charting Type Pain Assessment   Preferred Pain Scale number (Numeric Rating Pain Scale)   Pain Body Location abdomen  (and low back.)   Pain Rating (0-10): Rest 6   Pain Rating (0-10): Activity 6   Pain Management Interventions premedicated for activity   Vital Signs   Heart Rate 78   Heart Rate Source Monitor   SpO2 97 %   Patient Activity At rest   /71   BP Location Left upper arm   BP Method Automatic   Patient Position Lying      07/15/20 1050   Pain/Comfort/Sleep   Pain Charting Type Post Activity   Preferred Pain Scale number (Numeric Rating Pain Scale)   Pain Body Location abdomen  (and low back.)   Pain Rating (0-10): Rest 6   Pain Rating (0-10): Activity 6   Pain Management Interventions position adjusted   Vital Signs   Heart Rate (!) 105   Heart Rate Source Monitor   SpO2 98 %   Patient Activity Other (Comment)   /68   BP Location Left upper arm   BP Method Automatic   Patient Position Sitting   Patient Observation   Observations After supine to sit transfer to EOB     Prior Level of Function      Most Recent Value   Dominant Hand  right   Ambulation  independent   Transferring  independent   Toileting  independent   Bathing  independent   Dressing  independent   Eating  independent   Equipment Currently Used at Home  none             07/15/20 1035   Time Calculation   Start Time 1030   Stop Time 1100   Time Calculation (min) 30 min   Session Details   Document Type initial evaluation   Mode of Treatment physical therapy;individual therapy   General Information   Patient Profile Reviewed? yes   General Observations of Patient Pt. received in his room, agreeable to IE and tx.    Existing Precautions/Restrictions cardiac;fall;brace worn when out of bed;aspiration  (Simultaneous filing. User may not have seen previous data.)   Prior Level of Function   Dominant Hand right   Ambulation independent   Transferring independent   Home Use of Assistive/Adaptive Equipment   Equipment Currently Used at Home none   Equipment Brought to Hospital none   Living Environment   Living Arrangements house   Home Accessibility stairs to enter home (Group)   Additional Documentation Patient Bedroom Access (Group);Primary Bathroom Access (Group);Stairs Within Home, Secondary (Group)   Home Main Entrance   Number of Stairs, Main Entrance 4   Surface of Stairs, Main Entrance concrete   Stair Railings, Main Entrance railing on left side (ascending)   Landing, Stairs, Main Entrance railings present   Stairs Comment, Main Entrance side or front entrance   Stairs Within Home, Secondary   Number of Stairs, Within Home, Secondary other (see comments)   Stairs Comment, Within Home, Secondary has stair lift for wife   Primary Bathroom Access   Location, Bathroom first (main) floor;second floor, must negotiate stairs to access   Bathroom Access Comment 1st floor powder room, tub shower, wife has shower bench, renovating master bedroom closet into bathroom, standard toilets, no grab bars   Patient Bedroom Access   Location, Patient Bedroom second floor, must negotiate stairs to access   Patient Bedroom Access Comment has bed on first floor   Kitchen Access   Location, Kitchen first (main) floor   Cognition/Psychosocial   Comment, Cognitive Interventions pt. A and O x 4, able to follow multi-step commands independently.   Sensory   Additional Documentation Sensory Assessment (Somatosensory) (Group)   Vision Assessment/Intervention   Visual Impairment/Limitations WFL;corrective lenses for reading  (pt reports no other visual deficits)   Sensory Assessment (Somatosensory)   Sensory Assessment (Somatosensory) LE sensation  "intact;right-sided sensation intact  (B LE lt. touch/localization and proprioception (toes) intact)   Sensory Subjective Reports hypersensitivity;tingling;other (see comments)  (C/o occ. L thigh \"tightness\" and tingling/hypersensitivity)   Range of Motion (ROM)   Range of Motion bilateral lower extremities;ROM is WNL   Strength (Manual Muscle Testing)   Strength (Manual Muscle Testing) right lower extremity strength deficit;left lower extremity strength deficit;strength is WFL   Strength Comprehensive (MMT)   Comprehensive MMT Assessment lower extremity strength deficit   Lower Extremity (Manual Muscle Testing)   Lower Extremity: Manual Muscle Testing (MMT) left LE strength is WFL;right hip strength deficit;right knee strength deficit;right ankle strength deficit   MMT: Right Hip   Right Hip Manual Muscle Testing (MMT) flexion;extension;abduction   MMT: Flexion, Right Hip flexion   MMT: Extension, Right Hip extension   MMT: ABduction, Right Hip abduction   MMT, Gross Movement: Right Hip Flexion (3+/5) fair plus   MMT, Gross Movement: Right Hip ABduction (3-/5) fair minus   MMT, Gross Movement: Right Hip Extension (3-/5) fair minus   MMT: Right Knee   Right Knee Manual Muscle Testing (MMT) extension;flexion   MMT: Extension, Right Knee extension   MMT: Flexion, Right Knee flexion   MMT, Gross Movement: Right Knee Extension (3+/5) fair plus   MMT, Gross Movement: Right Knee Flexion (3+/5) fair plus   MMT: Right Ankle/Foot   Right Ankle Manual Muscle Testing (MMT) plantarflexion;dorsiflexion   MMT: Plantarflexion, Right Ankle plantarflexion   MMT: Dorsiflexion, Right Ankle Muscles dorsiflexion   MMT, Gross Movement: Right Ankle Plantarflexion (3/5) fair   MMT, Gross Movement: Right Ankle Dorsiflexion (3/5) fair   Bed Mobility   Bed Mobility scooting/bridging;supine to sit to supine;sidelying to sit to sidelying   [place], Roll Left close supervision;verbal cues   Verbal Cues (Roll Left) maintaining " precautions;preparatory posture;hand placement   [place], Roll Right close supervision;verbal cues   Verbal Cues (Roll Right) maintaining precautions;preparatory posture;hand placement   [place], Scoot/Bridge minimum assist (75% or more patient effort);verbal cues;nonverbal cues (demo/gesture)   Verbal Cues (Scoot/Bridge) maintaining precautions;preparatory posture;hand placement   [place], Supine to Sit minimum assist (75% or more patient effort);verbal cues;nonverbal cues (demo/gesture)   Verbal Cues (Supine to Sit) maintaining precautions;preparatory posture;hand placement   [place], Sit to Supine minimum assist (75% or more patient effort);verbal cues;nonverbal cues (demo/gesture)   Verbal Cues (Sit to Supine) maintaining precautions;preparatory posture;hand placement   [place], Sidelying to Sit to Sidelying minimum assist (75% or more patient effort);verbal cues;nonverbal cues (demo/gesture)   Verbal Cues (Sidelying to Sit to Sidelying) maintaining precautions;preparatory posture;hand placement   Assistive Device (Bed Mobility) bed rails   Transfers   Transfers stand pivot transfer;car transfer   Comment unable to assess w/o introduction of RWalker.   Bed to Chair Transfer   [place], Bed to Chair dependent (less than 25% patient effort);safety considerations   Comment unable to assess w/o introduction of RWalker.   Chair to Bed Transfer   [place], Chair to Bed dependent (less than 25% patient effort);safety considerations   Comment unable to assess w/o introduction of RWalker.   Sit to Stand Transfer   [place], Sit to Stand Transfer dependent (less than 25% patient effort);safety considerations   Comment unable to assess w/o introduction of RWalker.   Stand to Sit Transfer   Comment unable to assess w/o introduction of RWalker.   Stand Pivot Transfer   Comment unable to assess w/o introduction of RWalker.   Car Transfer   [place], Car Transfer dependent (less than  25% patient effort);safety considerations   Comment unable to assess w/o introduction of RWalker.   Gait Training   Advanced Gait Activity curb negotiation;sloped surfaces;rough/uneven surfaces   [place], Picking Up Object not tested  (safety considerations/spinal precautions.)   Comment unable to assess w/o introduction of RWalker.   Curb Negotiation (Mobility)   [place] safety considerations   Comment Pt. unable to safely perform due to reduced endurance, strength and balance.   Rough/Uneven Surface Gait Skills (Mobility)   [place] safety considerations   Comment Pt. unable to safely perform due to reduced endurance, strength and balance.   Sloped Surface Gait Skills (Mobility)   [place] safety considerations   Comment Pt. unable to safely perform due to reduced endurance, strength and balance.   Stairs Training   [place], Stairs minimum assist (75% or more patient effort);verbal cues;nonverbal cues (demo/gesture)   Assistive Device railing   Handrail Location both sides   Number of Stairs 4   Stair Height 6 inches   Ascending Stairs Technique step-to-step   Descending Stairs Technique step-to-step   Wheelchair Mobility/Management   Comment, Functional Mobility Unable to assess due to pt. being in a tilt-in-space w/c.   Balance   Balance Assessment sitting static balance;sitting dynamic balance;standing static balance;standing dynamic balance   Static Sitting Balance WNL;unsupported;sitting, edge of bed   Dynamic Sitting Balance WFL;unsupported;sitting, edge of bed   Static Standing Balance moderate impairment;unsupported;standing   Dynamic Standing Balance moderate impairment;unsupported   Balance Test Results Burrell Balance Scale   Burrell Balance Scale   Sitting to Standing 3   Standing Unsupported 3   Sitting with Back Unsupported but Feet Supported on Floor or on a Stool 4   Standing to Sitting 3   Transfers 1   Standing Unsupported with Eyes Closed 3   Standing Unsupported with Feet  Together 3   Reach Forward with Outstretched Arm While Standing 1    Object from Floor from a Standing Position 0  (NT due to spinal precautions)   Turning to Look Behind Over Left and Right Shoulders While Standing 0  (NT due to spinal precautions)   Turn 360 Degrees 1   Place Alternate Foot on Step or Stool While Standing Unsupported 1   Standing Unsupported One Foot in Front 1   Standing on One Leg 1   Burrell Balance Score 25   Orthotic Management   Orthosis Location spinal orthosis   Spinal Orthosis Management   Type (Spinal Orthosis) LSO (lumbar sacral orthosis)   Therapeutic Indications Spinal Orthosis post-op positioning/protection   Wearing Schedule (Spinal Orthosis) wear when out of bed only;remove for hygiene/bathing   IRF PT Goals   Bed Mobility Goal Selection (PT-IRF) bed mobility, PT goal 1;bed mobility, PT goal 2   Transfer Goal Selection (PT-IRF) transfers, PT goal 1;transfers, PT goal 2   Gait (Walking Locomotion) Goal Selection (PT-IRF) gait, PT goal 1;gait, PT goal 2   Stairs Goal Selection (PT-IRF) stairs, PT goal 1;stairs, PT goal 2   Problem Specific Goal Selection (PT-IRF) problem specific goal 1, PT;problem specific goal 2, PT   Additional Documentation Problem Specific Goal Selection (PT-IRF) (Row)   Bed Mobility Goal 1   Activity/Assistive Device bed mobility activities, all   [place] supervision required;verbal cues required   Time Frame short-term goal (STG);5 - 7 days   Bed Mobility Goal 2   Activity/Assistive Device bed mobility activities, all   [place] modified independence   Time Frame long-term goal (LTG);14 days or less   Transfer Goal 1   Activity/Assistive Device sit-to-stand/stand-to-sit;bed-to-chair/chair-to-bed;stand pivot;car transfer   [place] supervision required;verbal cues required   Time Frame short-term goal (STG);5 - 7 days   Transfer Goal 2   Activity/Assistive Device sit-to-stand/stand-to-sit;bed-to-chair/chair-to-bed;stand pivot;car transfer    [place] modified independence   Time Frame long-term goal (LTG);14 days or less   Gait/Walking Locomotion Goal 1   Activity/Assistive Device gait (walking locomotion);diminish gait deviation;increase endurance/gait distance;improve balance and speed   Distance 150 feet   [place] supervision required;verbal cues required   Time Frame short-term goal (STG);5 - 7 days   Gait/Walking Locomotion Goal 2   Activity/Assistive Device gait (walking locomotion);diminish gait deviation;increase endurance/gait distance;improve balance and speed   Distance 250 feet   [place] modified independence   Time Frame long-term goal (LTG);14 days or less   Stairs Goal 1   Activity/Assistive Device stairs, all skills;using handrail, left;using handrail, right   Number of Stairs 12   [place] supervision required;verbal cues required   Time Frame short-term goal (STG);5 - 7 days   Stairs Goal 2   Activity/Assistive Device stairs, all skills  (using 1 railing.)   Number of Stairs 12   [place] modified independence   Time Frame long-term goal (LTG);14 days or less   Problem Specific Goal 1   Problem-Specific Goal 1 Pt. will demonstrate an improvement in standing balance as evidenced by a Burrell Balance Test score of > than or = to 32/56.   Time Frame short-term goal (STG);5 - 7 days   Problem Specific Goal 2   Problem-Specific Goal 2 Pt. will demonstrate an improvement in standing balance as evidenced by a Burrell Balance Test score of > than or = to 41/56, indicating a low falls risk.   Time Frame long-term goal (LTG);14 days or less   Patient/Family Goals   Patient's Goals For Discharge return home;take care of myself at home;return to all previous roles/activities  (and manage stairs by himself.)   Therapy Assessment/Plan (PT)   Functional Level at Time of Evaluation (PT) Pt. is s/p L3-5 PLDF surgery and presents with impairments in strength, balance and gait pattern with c/o pain and resultant mobility handicap.  At  IE, he required MIn Assist of 1 with use of bedrails for Supine<>Sit bed mobility, was dependent with transfers and ambulation without introduction of a rolling walker (which allowed him to transfer STS and SPT and in/out of a mock-up/practice car with Min assist of 1 with cueing).  He was able to ambulate 50' with the RWalker on inside, level surfaces and manage 4 steps with both railing with MIn A of 1 with cueing.   PT Diagnosis Lumbar spinal stenosis with neurogenic claudication. S/p Posterior lumbar decompression L3-5 with L4-5 posterior lumbar fusion with instrumentation   Rehab Potential/Prognosis (PT) good, to achieve stated therapy goals   Frequency of Treatment (PT) 60-90 minutes per day;5-7 times per week   Estimated Duration of Therapy/Length of Stay (PT) 2 weeks   Problem List problems related to;pain;strength;balance;motor control;mobility   Activity Limitations Related to Problem List unable to transfer safely;unable to ambulate safely   Planned Therapy Interventions (PT) balance training;strengthening;stretching;home exercise program;bed mobility training;transfer training;gait training;stair training;thermal agents;patient/family education   Comment, Therapy Assessment/Plan (PT) This is a pleasant and previously independent 70 y.o. male pt. who is s/p L3-5 PLDF surgery after a hx. of lumbar spinal stenosis with neurogenic claudication.  He presents with the above listed deficits as well as some intermittent sensory changes (tingling in his L thigh) and LE weakness (R> L, with noted R DF weakness). He was dependent with fxl. transfers and ambulation w/o introduction of a rolling walker for safety and stability.  He scored a 25/56 on the Burrell Balance Test, indicating a medium falls risk.  He would benefit from PT services at this rehab level to maximize his overall functional independence, reduce his c/o pain, increase his strength and endurance, and improve his balance to allow for a safe return home  at d/c.  Anticipate he will achieve a level of Modified Indepependence using an assistive device and/or increased time for ambulation, stair mgmt., transfers and bed mobility by marie/cristiana COOMBS 2 weeks, pending rehab team input.                 Education provided this session. See the Patient Education summary report for full details.    IRF PT Goals      Most Recent Value   Bed Mobility Goal 1   Activity/Assistive Device  bed mobility activities, all at 07/15/2020 1035   [place]  supervision required, verbal cues required at 07/15/2020 1035   Time Frame  short-term goal (STG), 5 - 7 days at 07/15/2020 1035   Bed Mobility Goal 2   Activity/Assistive Device  bed mobility activities, all at 07/15/2020 1035   [place]  modified independence at 07/15/2020 1035   Time Frame  long-term goal (LTG), 14 days or less at 07/15/2020 1035   Transfer Goal 1   Activity/Assistive Device  sit-to-stand/stand-to-sit, bed-to-chair/chair-to-bed, stand pivot, car transfer at 07/15/2020 1035   [place]  supervision required, verbal cues required at 07/15/2020 1035   Time Frame  short-term goal (STG), 5 - 7 days at 07/15/2020 1035   Transfer Goal 2   Activity/Assistive Device  sit-to-stand/stand-to-sit, bed-to-chair/chair-to-bed, stand pivot, car transfer at 07/15/2020 1035   [place]  modified independence at 07/15/2020 1035   Time Frame  long-term goal (LTG), 14 days or less at 07/15/2020 1035   Gait/Walking Locomotion Goal 1   Activity/Assistive Device  gait (walking locomotion), diminish gait deviation, increase endurance/gait distance, improve balance and speed at 07/15/2020 1035   Distance  150 feet at 07/15/2020 1035   [place]  supervision required, verbal cues required at 07/15/2020 1035   Time Frame  short-term goal (STG), 5 - 7 days at 07/15/2020 1035   Gait/Walking Locomotion Goal 2   Activity/Assistive Device  gait (walking locomotion), diminish gait deviation, increase endurance/gait distance, improve balance  and speed at 07/15/2020 1035   Distance  250 feet at 07/15/2020 1035   [place]  modified independence at 07/15/2020 1035   Time Frame  long-term goal (LTG), 14 days or less at 07/15/2020 1035   Stairs Goal 1   Activity/Assistive Device  stairs, all skills, using handrail, left, using handrail, right at 07/15/2020 1035   Number of Stairs  12 at 07/15/2020 1035   [place]  supervision required, verbal cues required at 07/15/2020 1035   Time Frame  short-term goal (STG), 5 - 7 days at 07/15/2020 1035   Stairs Goal 2   Activity/Assistive Device  stairs, all skills [using 1 railing.] at 07/15/2020 1035   Number of Stairs  12 at 07/15/2020 1035   [place]  modified independence at 07/15/2020 1035   Time Frame  long-term goal (LTG), 14 days or less at 07/15/2020 1035   Problem Specific Goal 1   Problem-Specific Goal 1  Pt. will demonstrate an improvement in standing balance as evidenced by a Burrell Balance Test score of > than or = to 32/56. at 07/15/2020 1035   Time Frame  short-term goal (STG), 5 - 7 days at 07/15/2020 1035   Problem Specific Goal 2   Problem-Specific Goal 2  Pt. will demonstrate an improvement in standing balance as evidenced by a Burrell Balance Test score of > than or = to 41/56, indicating a low falls risk. at 07/15/2020 1035   Time Frame  long-term goal (LTG), 14 days or less at 07/15/2020 1035

## 2020-07-15 NOTE — PLAN OF CARE
Problem: Rehabilitation (IRF) Plan of Care  Goal: Plan of Care Review  Outcome: Progressing  Flowsheets (Taken 7/15/2020 0113)  Plan of Care Reviewed With: patient  IRF Plan of Care Review: progress ongoing, continue  Outcome Summary: pt sleeping peacefully,scd's in place, call bell within reach

## 2020-07-15 NOTE — CONSULTS
Nutrition Note  117/117D    Clinical Course: Patient is a 70 y.o. male who was admitted on 7/14/2020 with a diagnosis of S/P lumbar spinal fusion [Z98.1].     Nutrition Interventions/ Recommendations:   1. Continue Regular diet  2. Regular diet alternative foods list provided  3. Will send Boost Breeze TID  4. Discussed menu items available that would be easier to eat with an upset stomach  5. Will monitor po intake, weight, labs and tolerance of supplements  AT nutrition risk level 3    Past Medical History:   Diagnosis Date   • BPH (benign prostatic hyperplasia)    • DJD (degenerative joint disease)    • Hypertension    • Lumbar stenosis    • BEN (obstructive sleep apnea)      Past Surgical History:   Procedure Laterality Date   • POSTERIOR FUSION LUMBAR SPINE  07/06/2020    L3-5 PLDF            Dietary Orders   (From admission, onward)             Start     Ordered    07/14/20 1807  Adult Diet Regular; Thin Liquids  Diet effective now     Question Answer Comment   Diet Texture Regular    Fluid Consistency: Thin Liquids        07/14/20 1807                Reason for Assessment  Reason For Assessment: identified at risk by screening criteria, physician consult  Identified At Risk by Screening Criteria: unintentional loss of 10 lbs or more in the past 2 mos    [facility] Nutrition Screen Tool  Has patient lost weight without trying?: 0-->Yes  If yes,how much weight has been lost?: 2-->Unsure  Has patient been eating poorly due to decreased appetite?: 1-->Yes(abd pain)  MST Nutrition Screen Score: 3    Nutrition/Diet History  Diet Prior to Admission: Regular diet at home  Appetite Prior to Admission: Poor-0-25%  Intake (%): (0-25%)  Food Preferences: Chicken noodle soup, fruit ice, applesauce  Cultural/Yarsani Preferences: None  Supplemental Drinks/Foods/Additives: Willing to try Boost Breeze  Factors Affecting Nutritional Intake: abdominal pain, anorexia, nausea    Physical Findings  Overall Physical Appearance:  "(well-nourished)  Gastrointestinal: nausea  Last Bowel Movement: 07/14/20  Oral/Mouth Cavity: (WDL)  Skin: edema, surgical incision    Nutrition Order  Nutrition Order Review: meets nutritional requirements  Nutrition Order Comments: Regular/thins    Anthropometrics  Height: 180.3 cm (5' 11\")    Wt Readings from Last 3 Encounters:   07/14/20 81.6 kg (180 lb)   07/09/20 89.4 kg (197 lb)       Weights (last 7 days)     Date/Time   Weight    07/14/20 1800   81.6 kg (180 lb)            Current Weight  Weight Method: Bed scale  Weight: 81.6 kg (180 lb)    Ideal Body Weight (IBW)  Ideal Body Weight (IBW) (kg): 79.27  % Ideal Body Weight: 102.99    Usual Body Weight (UBW)  Usual Body Weight: 89.4 kg (197 lb)  % of Usual Body Weight Assessment: 85-95% - mild deficit  Weight Loss: unintentional  Time Frame: Other (comments)(17# (9%) X 8 days)    Body Mass Index (BMI)  BMI (Calculated): 25.1  BMI (kg/m2): 25.16  BMI Assessment: BMI 25-29.9: overweight  Nutritional Status/Malnutrition: Acute illness or Injury - Severe Malnutrition    Labs/Procedures/Meds  Lab Results Reviewed: reviewed, pertinent  Lab Results Comments: 7/15 glucose 108H, amylase 112H    CMP Results       07/15/20 07/10/20 07/09/20                    0527            141 140         K 4.2 3.8 4.6         Cl 105 9 108         CO2 24 26 25         Glucose 108 118 122         BUN 19 15 14         Creatinine 1.1 0.9 1.0         Calcium 9.3 -- --         Anion Gap 8 -- --         AST 26 -- --         ALT 44 -- --         Albumin 3.5 -- --         EGFR >60.0 -- --                     Lab Results   Component Value Date    ALT 44 07/15/2020    AST 26 07/15/2020    ALKPHOS 54 07/15/2020    BILITOT 0.8 07/15/2020     No results found for: [id number]  Lab Results   Component Value Date    CALCIUM 9.3 07/15/2020     Lab Results   Component Value Date    WBC 8.99 07/15/2020    HGB 11.8 (L) 07/15/2020    HCT 35.5 (L) 07/15/2020    MCV 93.9 07/15/2020     (H) " 07/15/2020     No results found for: IRON, TIBC, FERRITIN  No results found for: CHOL  No results found for: HDL  No results found for: LDLCALC  No results found for: TRIG  No results found for: CHOLHDL  Glucose Results     No lab values to display.          • cholecalciferol (vitamin D3)  1,000 Units oral Daily   • famotidine  20 mg oral BID   • heparin (porcine)  5,000 Units subcutaneous q8h CARRIE   • losartan  50 mg oral Daily   • pantoprazole  40 mg oral Daily before breakfast   • polyethylene glycol  17 g oral Daily   • rosuvastatin  5 mg oral Daily   • sennosides-docusate sodium  1 tablet oral BID   • tamsulosin  0.4 mg oral Nightly     Medications  Pertinent Medications Reviewed: reviewed, pertinent  Pertinent Medications Comments: Vit D3, pepcid, protonix, miralax, senokot, zofran (prn), miralax (prn)    Estimated/Assessed Needs  Additional Documentation: Calorie Requirements (Group), Fluid Requirements (Group), Protein Requirements (Group)    Calorie Requirements  Estimated kCal Needs: Actual Body Weight  Estimated Calorie Need Method: kcal/kg  Calorie/kg Recommended: 25-30  Calorie Recommendations: 9272-2641    Protein Requirements  Recommended Dosing Weight (Estimated Protein Needs): Actual Body Weight  Est Protein Requirement Amount (gms/kg): (1.2-1.3)  Protein Recommendations:     Fluid Requirements  Fluid Recommendation (mL): 25-30ml  Recommended Fluid Needs Dosing Weight: Actual Body Weight  Fluid Requirements (mL/day): (3158-4181)  Estimated Fluid Requirement Method: RDA Method  Benja Method (over 20 kg): 3132.94    PES  Statement: PES Statement  Nutrition Diagnosis: Inadequate Protein-Energy Intake  Related To:: Decreased ability to consume sufficient energy  As Evidenced By:: Eating only 0-25% of meals, significant weight loss  Nutritional Needs Met?: No  Nutrition Status Classification: Severe nutritional compromise                                 Clinical Comments:     Patient was  sitting up in bed with breakfast tray in front of him.  States he too nauseous to eat much (has omelette, milk, coffee).  States he drank some gingerale, grape juice and tea.  He agreed to try the Boost Breeze and ordered chicken noodle soup and water ice for lunch and dinner.  He also requested some applesauce for his breakfast.  He has had significant weight loss.  He c/o abdominal pain in addition to his nausea (obstruction series was negative), amylase was mildly elevated this am.    Goals:  1. Adequate po intake to meet > 75% of nutritional needs  2. Maintain current weight of 180#  3. Lytes/labs WNL    Discussed with: Patient    Date: 07/15/20  Signature: Veronika Renner RD

## 2020-07-15 NOTE — CONSULTS
Wound Ostomy Continence Note    Subjective    HPI Patient is a 70 y.o. male who was admitted on 7/14/2020 with a diagnosis of S/P lumbar spinal fusion [Z98.1].    Problem list Problem List:   Patient Active Problem List   Diagnosis   • Spinal stenosis   • HTN (hypertension)   • DJD (degenerative joint disease)   • DDD (degenerative disc disease), cervical   • BEN (obstructive sleep apnea)   • S/P lumbar spinal fusion   • Benign prostatic hyperplasia with lower urinary tract symptoms      PMH/PSH Medical History:   Past Medical History:   Diagnosis Date   • BPH (benign prostatic hyperplasia)    • DJD (degenerative joint disease)    • Hypertension    • Lumbar stenosis    • BEN (obstructive sleep apnea)      Surgical History:   Past Surgical History:   Procedure Laterality Date   • POSTERIOR FUSION LUMBAR SPINE  07/06/2020    L3-5 PLDF      Assessment and Recommendation         Patient with dressing on back dry and intact. He would not allow me to remove dressing to look at his incision. He stated that it was already changed for his shower. RN notified. Dr. Yeh notified. Will attempt to assess back incision in am if patient allows me to do so. PI prevention education given. Suggest: offload heels from bed, turn q 2 hours in bed, weight shift in wheelchair, q 30 minutes, dressing change of back incision daily.    Date: 07/15/20  Signature: Pearl Cabral RN

## 2020-07-15 NOTE — PROGRESS NOTES
Patient: Jael Heramn  Location: [facility]  MRN: [id number]  Today's date: 7/15/2020    History of Present Illness  Jael Herman is a 70 y.o. male whose primary indication for inpatient rehabilitation is Spinal Cord, Non-Traumatic.  Pertinent History of Current Functional Problem:  He has a history of Spinal stenosis of lumbar region with neurogenic claudication [M48.062] and was admitted for a lumbar spinal fusion. Underwent L3-5 PLDF (posterior lumbar decompression, L4-5 posterior lumbar fusion with instrumentation.  by Dr. Gregorio Lancaster 7/6/20 at [facility]. Post op he had anemia but did not require transfusion. His pain was managed with Tylenol, Neurontin, Flexeril and Dilaudid.  He was put on SQ Heparin for DVT ppx. The patient was seen by PM&R and found to have residual deficits in ambulation and ADLs due to S/P lumbar spinal fusion [Z98.1] and was transferred to [facility] on 7/14/2020 for acute inpatient rehab.      Past Medical History  Jael has a past medical history of BPH (benign prostatic hyperplasia), DJD (degenerative joint disease), Hypertension, Lumbar stenosis, and BEN (obstructive sleep apnea).        Prior Living Environment      Most Recent Value   Lives With  spouse   Living Arrangements  house   Home Accessibility  stairs to enter home (Group)   Number of Stairs, Main Entrance  4   Surface of Stairs, Main Entrance  concrete   Stair Railings, Main Entrance  railing on left side (ascending)   Landing, Stairs, Main Entrance  railings present   Stairs Comment, Main Entrance  side or front entrance   Location, Kitchen  first (main) floor   Kitchen Access Comment  pt completes IADLs at home   Location, Patient Bedroom  second floor, must negotiate stairs to access   Patient Bedroom Access Comment  has bed on first floor   Location, Bathroom  first (main) floor, second floor, must negotiate stairs to access   Bathroom Access Comment  1st  floor powder room, tub shower, wife has shower bench, renovating master bedroom closet into bathroom, standard toilets, no grab bars   Number of Stairs, Within Home, Secondary  other (see comments)   Stairs Comment, Within Home, Secondary  has stair lift for wife          Prior Level of Function      Most Recent Value   Dominant Hand  right   Ambulation  independent   Transferring  independent   Toileting  independent   Bathing  independent   Dressing  independent   Eating  independent   Equipment Currently Used at Home  none           07/15/20 0840   Time Calculation   Start Time 0830   Stop Time 0900   Time Calculation (min) 30 min   Session Details   Document Type daily treatment/progress note   Mode of Treatment occupational therapy;individual therapy   Transfers   Transfers stand pivot transfer;toilet transfer;shower transfer   Sit to Stand Transfer   [place], Sit to Stand Transfer minimum assist (75% or more patient effort)   Verbal Cues hand placement;safety;technique   Assistive Device walker, front-wheeled   Comment EOB   Stand to Sit Transfer   [place], Stand to Sit Transfer minimum assist (75% or more patient effort)   Verbal Cues hand placement;safety;technique   Assistive Device walker, front-wheeled   Comment EOB   Stand Pivot Transfer   Comment amb approach to EOB   [place], Stand Pivot/Stand Step Transfer minimum assist (75% or more patient effort)   Verbal Cues safety;technique   Assistive Device walker, front-wheeled   Toilet Transfer   Transfer Technique stand pivot   [place], Toilet Transfer minimum assist (75% or more patient effort)   Assistive Device grab bars/safety frame;raised toilet seat;walker, front-wheeled  (RW)   Comment amb approach to hospital toilet     Shower Transfer   Transfer Technique stand pivot   [place], Shower Transfer minimum assist (75% or more patient effort)   Assistive Device grab bars/tub rail;shower chair;walker, front-wheeled   Comment amb  approach to shower chair in barrier free shower   Safety Issues, Functional Mobility   Comment, Safety Issues/Impairments (Mobility) OT: min A with RW EOB <> bathroom   Daily Progress Summary (OT)   Daily Outcome Statement (OT) Introduced DME to increase I and safety with functional transfers.                           IRF OT Goals      Most Recent Value   Transfer Goal 1   Activity/Assistive Device  toilet at 07/15/2020 0740   [place]  supervision required at 07/15/2020 0740   Time Frame  short-term goal (STG), 1 week at 07/15/2020 0740   Transfer Goal 2   Activity/Assistive Device  toilet at 07/15/2020 0740   [place]  modified independence at 07/15/2020 0740   Time Frame  long-term goal (LTG), 2 weeks at 07/15/2020 0740   Transfer Goal 3   Activity/Assistive Device  shower at 07/15/2020 0740   [place]  set-up required, supervision required at 07/15/2020 0740   Time Frame  short-term goal (STG), 1 week at 07/15/2020 0740   Transfer Goal 4   Activity/Assistive Device  shower at 07/15/2020 0740   [place]  set-up required, supervision required at 07/15/2020 0740   Time Frame  long-term goal (LTG), 2 weeks at 07/15/2020 0740   Bathing Goal 1   [place]  minimum assist (75% or more patient effort) at 07/15/2020 0740   Time Frame  short-term goal (STG), 1 week at 07/15/2020 0740   Bathing Goal 2   [place]  supervision required, set-up required at 07/15/2020 0740   Time Frame  short-term goal (STG), 1 week at 07/15/2020 0740   UB Dressing Goal 1   [place]  set-up required, supervision required at 07/15/2020 0740   Time Frame  short-term goal (STG), 1 week at 07/15/2020 0740   UB Dressing Goal 2   [place]  modified independence at 07/15/2020 0740   Time Frame  long-term goal (LTG), 2 weeks at 07/15/2020 0740   LB Dressing Goal 1   [place]  moderate assist (50-74% patient effort) at 07/15/2020 0740   Time Frame  short-term goal (STG), 1 week at 07/15/2020 0740   LB Dressing Goal 2    [place]  modified independence at 07/15/2020 0740   Time Frame  long-term goal (LTG), 2 weeks at 07/15/2020 0740   Grooming Goal 1   [place]  supervision required at 07/15/2020 0740   Time Frame  short-term goal (STG), 1 week at 07/15/2020 0740   Strategies/Barriers  in stance at 07/15/2020 0740   Grooming Goal 2   [place]  modified independence at 07/15/2020 0740   Time Frame  long-term goal (LTG), 2 weeks at 07/15/2020 0740   Strategies/Barriers  in stance at 07/15/2020 0740   Toileting Goal 1   [place]  minimum assist (75% or more patient effort) at 07/15/2020 0740   Time Frame  short-term goal (STG), 1 week at 07/15/2020 0740   Toileting Goal 2   [place]  modified independence at 07/15/2020 0740   Time Frame  long-term goal (LTG), 2 weeks at 07/15/2020 0740

## 2020-07-15 NOTE — PLAN OF CARE
Problem: Rehabilitation (IRF) Plan of Care  Goal: Plan of Care Review  Outcome: Progressing  Flowsheets (Taken 7/14/2020 2228)  Plan of Care Reviewed With: patient  IRF Plan of Care Review: progress ongoing, continue  Outcome Summary: Pt is alert oriented, c/o pain in back and abdominal discomfort, managed with prn pain meds,  no c/o nausea, Continent with bladder, No BM. Safety maintained. SCDs on. All care and meds reviewed with pt.

## 2020-07-15 NOTE — PROGRESS NOTES
[facility] Progress Note          Patient was seen and examined.   Attestation Notes: Face to face encounter completed    Jael eHrman is a 70 y.o. male who was admitted for S/P lumbar spinal fusion [Z98.1]. Patient was referred by Anoop Yeh MD for medical assessment and management      CC: S/P lumbar spinal fusion [Z98.1]     HPI: Jael Herman is a 70 y.o. male with HTN, HL, BPH, BEN (no CPAP), and lumbar stenosis who underwent L3-5 PLDF by Dr. Gregorio Lancaster 7/6/20 at [facility]. Post op he had anemia but did not require transfusion. His pain was managed with Tylenol, Neurontin, Flexeril and Dilaudid.  He was put on SQ Heparin for DVT ppx.      He remained on Losartan for HTN and Crestor for HL. He was also on Flomax for BPH.      He had some post op abdominal discomfort and nausea. Xrays negative for obstruction. Amylase and lipase were not elevated. He was treated with Senokot, Colace, Miralax and Dulcolax suppositories for constipation.      The patient was seen by PM&R and found to have residual deficits in ambulation and ADLs due to S/P lumbar spinal fusion [Z98.1] and came to [facility] on 7/14/2020 for acute inpatient rehab.     He participated in therapy.     SUBJECTIVE:  Patient interviewed and examined     He still notes GI discomfort, xray today with possible mild ileus but no obstruction or perforation     Pain stable, no new weakness or numbness, no dysuria, no chest pain, palpitations, dyspnea or fevers    Review of Systems:  All other systems reviewed and negative except as noted in the HPI.    Current meds and allergies reviewed    Past Medical History:   Diagnosis Date   • BPH (benign prostatic hyperplasia)    • DJD (degenerative joint disease)    • Hypertension    • Lumbar stenosis    • BEN (obstructive sleep apnea)      Past Surgical History:   Procedure Laterality Date   • POSTERIOR FUSION LUMBAR SPINE  07/06/2020    L3-5 PLDF      Social History     Tobacco Use   • Smoking status: Never Smoker   • Smokeless tobacco: Never Used   Substance Use Topics   • Alcohol use: Not Currently   • Drug use: Never      History reviewed. No pertinent family history.    Vital signs in last 24 hours:  Temp:  [36.7 °C (98 °F)-37.1 °C (98.7 °F)] 36.8 °C (98.3 °F)  Heart Rate:  [] 98  Resp:  [18] 18  BP: (130-142)/(68-81) 142/81  Vital signs reviewed 07/15/20 1:55 PM    Physical Exam   Constitutional: He is oriented to person, place, and time. He appears well-developed and well-nourished.   HENT:   Head: Normocephalic and atraumatic.   Right Ear: External ear normal.   Left Ear: External ear normal.   Nose: Nose normal.   Mouth/Throat: Oropharynx is clear and moist.   Eyes: Pupils are equal, round, and reactive to light. Conjunctivae and EOM are normal.   Neck: Normal range of motion. Neck supple.   Cardiovascular: Normal rate, regular rhythm, normal heart sounds and intact distal pulses. Exam reveals no gallop and no friction rub.   No murmur heard.  Pulmonary/Chest: Effort normal and breath sounds normal. No stridor. No respiratory distress. He has no wheezes. He has no rales.   Abdominal: Soft. Bowel sounds are normal. He exhibits no distension. There is no tenderness.   Musculoskeletal: He exhibits edema and deformity (s/p lumbar fusion).   Neurological: He is alert and oriented to person, place, and time.   Skin: Skin is warm and dry.   Psychiatric: He has a normal mood and affect. His behavior is normal.   Nursing note and vitals reviewed.              Objective    Labs:  I have reviewed the patient's labs.  Significant abnormals are anemia.  Results from last 7 days   Lab Units 07/15/20  0527   WBC K/uL 8.99   HEMOGLOBIN g/dL 11.8*   HEMATOCRIT % 35.5*   PLATELETS K/uL 385*     Results from last 7 days   Lab Units 07/15/20  0527   SODIUM mEQ/L 137   POTASSIUM mEQ/L 4.2   CHLORIDE mEQ/L 105   CO2 mEQ/L 24   BUN mg/dL 19   CREATININE mg/dL 1.1    CALCIUM mg/dL 9.3   ALBUMIN g/dL 3.5   BILIRUBIN TOTAL mg/dL 0.8   ALK PHOS IU/L 54   ALT IU/L 44   AST IU/L 26   GLUCOSE mg/dL 108*     Results from last 7 days   Lab Units 07/15/20  0527   AMYLASE U/L 112*     Lab Results   Component Value Date    LIPASE 34 07/15/2020       Imagin/15/20: obstruction series:  IMPRESSION:   1.  No acute cardiopulmonary disease.  2.  Possible mild ileus.      ASSESSMENT/PLAN:    70 y.o. male  with HTN, HL, BPH, BEN (no CPAP), and lumbar stenosis who underwent L3-5 PLDF by Dr. Gregorio Lancaster 20 at [facility]     1. Neuro:  -lumbar stenosis s/p L3-5 PLDF  -Tylenol and Dilaudid prn pain     2. Vasc:  -bilat LE edema  -SCDs and SQ Heparin for DVT ppx  -doppler US bilat LE's neg. 7/15/20     3. Heme:  -anemia due to blood loss, no iron due to GI distress  -follow CBC     4. Renal:  -increased risk of dehydration and electrolyte changes  -follow BMP, Mg     5. Gi:  -abdominal pain since surgery, repeated xrays without obstruction, seen by general surgery  -7/15/20 obstruction series with mild ileus  -7/15/20 amylase slightly elevated and lipase normal, not likely cause of discomfort  -Senokot-S and Miralax for bowels  -Protonix and Pepcid for GERD and ulcer ppx     6. Gu:  -BPH on Flomax  -no UTI or retention     7. Cardiac:  -HTN on Losartan  -watch for orthostatic hypotension  -use cardiac precautions in therapy     8. Pulm:  -BEN but no CPAP  -incentive spirometry for atelectasis     9. Derm:  -consulted [facility] for skin assessment     10. Nutrition:  -seen by Nutrition for assessment and education     11. Endo:  -HL on Crestor     12. Psych:  -seen by Psychology for support     plan discussed with patient, nurse, case management and Anoop Yeh MD Scott Sapperstein, MD  7/15/2020  1:55 PM

## 2020-07-15 NOTE — PROGRESS NOTES
07/15/20 1000   General Information   Preferred Language English   Referring Facility Type acute care facility   Contact Information   Permission Granted to Share Info With family/designee   Contact Information Comments Renata    Advance Directives (for Healthcare)   Does patient have advance directive? No   Living Environment   Lives With spouse   Name(s) of Who Lives With Patient Renata    Unique Family Situation dghtr staying currently. leaving 07/20/20. Another daughter lives in area.    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    Financial/Legal   Source of Income pension/custodial;social security   Values/Beliefs   Spiritual, Cultural Beliefs, Christian Practices, Values that Affect Care no   Values/Beliefs Comment Baptists    Discharge Needs Assessment (IRF)   Anticipated Discharge Disposition home with home health services   Patient's Choice of Community Agency(s)   ([facility]. [facility] )   Team Conference   Next Team Conference Date 07/21/20   Final Note   Final Note wife does not drive,  aides with wife care.  Patient was primary .  Family able to assist.

## 2020-07-15 NOTE — PROGRESS NOTES
Patient: Jael Herman  Location: [facility] [address]  MRN: [id number]  Today's date: 7/15/2020    History of Present Illness  Jael Herman is a 70 y.o. male whose primary indication for inpatient rehabilitation is Spinal Cord, Non-Traumatic.  Pertinent History of Current Functional Problem:  He has a history of Spinal stenosis of lumbar region with neurogenic claudication [M48.062] and was admitted for a lumbar spinal fusion. Underwent L3-5 PLDF (posterior lumbar decompression, L4-5 posterior lumbar fusion with instrumentation.  by Dr. Gregorio Lancaster 7/6/20 at [facility]. Post op he had anemia but did not require transfusion. His pain was managed with Tylenol, Neurontin, Flexeril and Dilaudid.  He was put on SQ Heparin for DVT ppx. The patient was seen by PM&R and found to have residual deficits in ambulation and ADLs due to S/P lumbar spinal fusion [Z98.1] and was transferred to [facility] on 7/14/2020 for acute inpatient rehab.      Past Medical History  Jael has a past medical history of BPH (benign prostatic hyperplasia), DJD (degenerative joint disease), Hypertension, Lumbar stenosis, and BEN (obstructive sleep apnea).    PT Vitals    Date/Time Pulse HR Source SpO2 Pt Activity BP BP Location BP Method Pt Position Observations [facility]   07/15/20 1105 120 Monitor 99 % Walking 116/72 Left upper arm Automatic Sitting After ambulating 50' with the RWalker and MIn A of 1.  DTW      PT Pain    Date/Time Pain Type Pref Pain Scale Location Rating: Rest Rating: Activity Interventions [facility]   07/15/20 1105 Post Activity number (Numeric Rating Pain Scale) abdomen and low back. 6 7 relaxation techniques promoted DTW       Prior Living Environment      Most Recent Value   Lives With  spouse   Living Arrangements  house   Home Accessibility  stairs to enter home (Group)   Number of Stairs, Main Entrance  4   Surface of Stairs, Main Entrance  concrete   Stair Railings, Main Entrance   railing on left side (ascending)   Landing, Stairs, Main Entrance  railings present   Stairs Comment, Main Entrance  side or front entrance   Location, Kitchen  first (main) floor   Kitchen Access Comment  pt completes IADLs at home   Location, Patient Bedroom  second floor, must negotiate stairs to access   Patient Bedroom Access Comment  has bed on first floor   Location, Bathroom  first (main) floor, second floor, must negotiate stairs to access   Bathroom Access Comment  1st floor powder room, tub shower, wife has shower bench, renovating master bedroom closet into bathroom, standard toilets, no grab bars   Number of Stairs, Within Home, Secondary  other (see comments)   Stairs Comment, Within Home, Secondary  has stair lift for wife          Prior Level of Function      Most Recent Value   Dominant Hand  right   Ambulation  independent   Transferring  independent   Toileting  independent   Bathing  independent   Dressing  independent   Eating  independent   Equipment Currently Used at Home  none          IRF PT Evaluation and Treatment - 07/15/20 1105        Time Calculation    Start Time  1100     Stop Time  1200     Time Calculation (min)  60 min        Session Details    Document Type  daily treatment/progress note     Mode of Treatment  physical therapy;individual therapy        General Information    Patient Profile Reviewed?  yes     General Observations of Patient  Pt treated in main gym.     Existing Precautions/Restrictions  cardiac;fall;spinal;brace worn when out of bed        Transfers    Transfers  stand pivot transfer;car transfer     Maintains Weight-Bearing Status (Transfers)  --    abl       Bed to Chair Transfer    [place], Bed to Chair  verbal cues;nonverbal cues (demo/gesture);minimum assist (75% or more patient effort)     Verbal Cues  hand placement;maintaining precautions;safety     Assistive Device  walker, front-wheeled        Chair to Bed Transfer    [place], Chair to Bed   minimum assist (75% or more patient effort);verbal cues;nonverbal cues (demo/gesture)     Verbal Cues  hand placement;maintaining precautions;safety     Assistive Device  walker, front-wheeled        Sit to Stand Transfer    [place], Sit to Stand Transfer  minimum assist (75% or more patient effort);verbal cues;nonverbal cues (demo/gesture)     Verbal Cues  hand placement;maintaining precautions;safety     Assistive Device  walker, front-wheeled        Stand to Sit Transfer    [place], Stand to Sit Transfer  minimum assist (75% or more patient effort);verbal cues;nonverbal cues (demo/gesture)     Verbal Cues  hand placement;maintaining precautions;safety     Assistive Device  walker, front-wheeled        Stand Pivot Transfer    [place], Stand Pivot/Stand Step Transfer  minimum assist (75% or more patient effort);verbal cues;nonverbal cues (demo/gesture)     Verbal Cues  hand placement;maintaining precautions;safety     Assistive Device  walker, front-wheeled        Car Transfer    Transfer Technique  stand pivot     [place], Car Transfer  minimum assist (75% or more patient effort);verbal cues;nonverbal cues (demo/gesture)     Verbal Cues  hand placement;maintaining precautions;safety;technique     Assistive Device  walker, front-wheeled        Gait Training    Assistive Device  walker, front-wheeled     Distance in Feet  100 feet    1 x 50', 2 x 100'    Gait Pattern Utilized  step-through     Deviations/Abnormal Patterns (Gait)  right sided deviations;base of support, wide;gait speed decreased;step length decreased     Right Sided Gait Deviations  heel strike decreased        Balance    Balance Test Results  --        Lower Extremity (Therapeutic Exercise)    Exercise Position/Type (LE Therapeutic Exercise)  seated;supine;AROM (active range of motion)     General Exercise (LE Therapeutic Exercise)  bilateral;LAQ (long arc quad);quad sets;heel slides     Range of Motion Exercises (LE Therapeutic  Exercise)  bilateral;ankle dorsiflexion/plantarflexion     Reps and Sets (LE Therapeutic Exercise)  10 x each        Daily Progress Summary (PT)    Symptoms Noted During/After Treatment  fatigue;increased pain     Daily Outcome Statement (PT)  Physical therapy IE completed with treatment initiated. Pt. tolerating multiple treatment activities well with some increased low back pain.                        Education provided this session. See the Patient Education summary report for full details.    IRF PT Goals      Most Recent Value   Bed Mobility Goal 1   Activity/Assistive Device  bed mobility activities, all at 07/15/2020 1035   [place]  supervision required, verbal cues required at 07/15/2020 1035   Time Frame  short-term goal (STG), 5 - 7 days at 07/15/2020 1035   Bed Mobility Goal 2   Activity/Assistive Device  bed mobility activities, all at 07/15/2020 1035   [place]  modified independence at 07/15/2020 1035   Time Frame  long-term goal (LTG), 14 days or less at 07/15/2020 1035   Transfer Goal 1   Activity/Assistive Device  sit-to-stand/stand-to-sit, bed-to-chair/chair-to-bed, stand pivot, car transfer at 07/15/2020 1035   [place]  supervision required, verbal cues required at 07/15/2020 1035   Time Frame  short-term goal (STG), 5 - 7 days at 07/15/2020 1035   Transfer Goal 2   Activity/Assistive Device  sit-to-stand/stand-to-sit, bed-to-chair/chair-to-bed, stand pivot, car transfer at 07/15/2020 1035   [place]  modified independence at 07/15/2020 1035   Time Frame  long-term goal (LTG), 14 days or less at 07/15/2020 1035   Gait/Walking Locomotion Goal 1   Activity/Assistive Device  gait (walking locomotion), diminish gait deviation, increase endurance/gait distance, improve balance and speed at 07/15/2020 1035   Distance  150 feet at 07/15/2020 1035   [place]  supervision required, verbal cues required at 07/15/2020 1035   Time Frame  short-term goal (STG), 5 - 7 days at 07/15/2020  1035   Gait/Walking Locomotion Goal 2   Activity/Assistive Device  gait (walking locomotion), diminish gait deviation, increase endurance/gait distance, improve balance and speed at 07/15/2020 1035   Distance  250 feet at 07/15/2020 1035   [place]  modified independence at 07/15/2020 1035   Time Frame  long-term goal (LTG), 14 days or less at 07/15/2020 1035   Stairs Goal 1   Activity/Assistive Device  stairs, all skills, using handrail, left, using handrail, right at 07/15/2020 1035   Number of Stairs  12 at 07/15/2020 1035   [place]  supervision required, verbal cues required at 07/15/2020 1035   Time Frame  short-term goal (STG), 5 - 7 days at 07/15/2020 1035   Stairs Goal 2   Activity/Assistive Device  stairs, all skills [using 1 railing.] at 07/15/2020 1035   Number of Stairs  12 at 07/15/2020 1035   [place]  modified independence at 07/15/2020 1035   Time Frame  long-term goal (LTG), 14 days or less at 07/15/2020 1035   Problem Specific Goal 1   Problem-Specific Goal 1  Pt. will demonstrate an improvement in standing balance as evidenced by a Burrell Balance Test score of > than or = to 32/56. at 07/15/2020 1035   Time Frame  short-term goal (STG), 5 - 7 days at 07/15/2020 1035   Problem Specific Goal 2   Problem-Specific Goal 2  Pt. will demonstrate an improvement in standing balance as evidenced by a Burrell Balance Test score of > than or = to 41/56, indicating a low falls risk. at 07/15/2020 1035   Time Frame  long-term goal (LTG), 14 days or less at 07/15/2020 1035

## 2020-07-15 NOTE — PLAN OF CARE
Problem: Skin Injury Risk Increased  Goal: Skin Health and Integrity  Outcome: Progressing  Intervention: Promote and Optimize Oral Intake  Flowsheets (Taken 7/15/2020 1138)  Oral Nutrition Promotion: calorie dense liquids provided; nutritional therapy counseling provided     Problem: Oral Intake Inadequate  Goal: Improved Oral Intake  Outcome: Progressing

## 2020-07-15 NOTE — HOSPITAL COURSE
History of Present Illness  Jael Herman is a 70 y.o. male whose primary indication for inpatient rehabilitation is Spinal Cord, Non-Traumatic.  Pertinent History of Current Functional Problem:  He has a history of Spinal stenosis of lumbar region with neurogenic claudication [M48.062] and was admitted for a lumbar spinal fusion. Underwent L3-5 PLDF (posterior lumbar decompression, L4-5 posterior lumbar fusion with instrumentation.  by Dr. Gregorio Lancaster 7/6/20 at [facility]. Post op he had anemia but did not require transfusion. His pain was managed with Tylenol, Neurontin, Flexeril and Dilaudid.  He was put on SQ Heparin for DVT ppx. The patient was seen by PM&R and found to have residual deficits in ambulation and ADLs due to S/P lumbar spinal fusion [Z98.1] and was transferred to [facility] on 7/14/2020 for acute inpatient rehab.      Past Medical History  Jael has a past medical history of BPH (benign prostatic hyperplasia), DJD (degenerative joint disease), Hypertension, Lumbar stenosis, and BEN (obstructive sleep apnea).

## 2020-07-15 NOTE — PLAN OF CARE
Problem: Rehabilitation (IRF) Plan of Care  Goal: Plan of Care Review  Outcome: Progressing  Flowsheets (Taken 7/15/2020 1235)  Plan of Care Reviewed With: patient  IRF Plan of Care Review: progress ongoing, continue  Outcome Summary: Initial OT eval completed.

## 2020-07-15 NOTE — PROGRESS NOTES
Patient: Jael Herman  Location: [facility]  MRN: [id number]  Today's date: 7/15/2020    History of Present Illness  Jael Herman is a 70 y.o. male whose primary indication for inpatient rehabilitation is Spinal Cord, Non-Traumatic.  Pertinent History of Current Functional Problem:  He has a history of Spinal stenosis of lumbar region with neurogenic claudication [M48.062] and was admitted for a lumbar spinal fusion. Underwent L3-5 PLDF (posterior lumbar decompression, L4-5 posterior lumbar fusion with instrumentation.  by Dr. Gregorio Lancaster 7/6/20 at [facility]. Post op he had anemia but did not require transfusion. His pain was managed with Tylenol, Neurontin, Flexeril and Dilaudid.  He was put on SQ Heparin for DVT ppx. The patient was seen by PM&R and found to have residual deficits in ambulation and ADLs due to S/P lumbar spinal fusion [Z98.1] and was transferred to [facility] on 7/14/2020 for acute inpatient rehab.      Past Medical History  Jael has a past medical history of BPH (benign prostatic hyperplasia), DJD (degenerative joint disease), Hypertension, Lumbar stenosis, and BEN (obstructive sleep apnea).    OT Vitals    Date/Time Pulse BP BP Location BP Method Pt Position [facility]   07/15/20 0738 98 142/81 Left upper arm Automatic Lying SAMANTHA      OT Pain    Date/Time Pain Type Location Rating: Rest Interventions [facility]   07/15/20 0738 Pain Assessment abdomen back 6 premedicated for activity SAMANTHA          Prior Living Environment      Most Recent Value   Lives With  spouse   Living Arrangements  house   Home Accessibility  stairs to enter home (Group)   Number of Stairs, Main Entrance  4   Surface of Stairs, Main Entrance  concrete   Stair Railings, Main Entrance  railing on left side (ascending)   Landing, Stairs, Main Entrance  railings present   Stairs Comment, Main Entrance  side or front entrance   Location, Kitchen  first (main) floor   Kitchen Access  Comment  pt completes IADLs at home   Location, Patient Bedroom  second floor, must negotiate stairs to access   Patient Bedroom Access Comment  has bed on first floor   Location, Bathroom  first (main) floor, second floor, must negotiate stairs to access   Bathroom Access Comment  1st floor powder room, tub shower, wife has shower bench, renovating master bedroom closet into bathroom, standard toilets, no grab bars   Number of Stairs, Within Home, Secondary  other (see comments)   Stairs Comment, Within Home, Secondary  has stair lift for wife          Prior Level of Function      Most Recent Value   Dominant Hand  right   Ambulation  independent   Transferring  independent   Toileting  independent   Bathing  independent   Dressing  independent   Eating  independent   Equipment Currently Used at Home  none             07/15/20 0740   Time Calculation   Start Time 0730   Stop Time 0830   Time Calculation (min) 60 min   Session Details   Document Type initial evaluation   Mode of Treatment occupational therapy;individual therapy   General Information   Existing Precautions/Restrictions cardiac;fall;spinal;brace worn when out of bed   Occupational Profile   Occupational History/Life Experiences (Occupational Profile) +,  retired , I PTA with ADLs, IADLs, (wife unable to assist, due to own health issues)   Prior Level of Function   Dominant Hand right   Ambulation independent   Transferring independent   Toileting independent   Bathing independent   Dressing independent   Eating independent   Home Use of Assistive/Adaptive Equipment   Equipment Currently Used at Home none   Living Environment   Living Arrangements house   Home Accessibility stairs to enter home (Group)   Additional Documentation Patient Bedroom Access (Group);Primary Bathroom Access (Group);Stairs Within Home, Secondary (Group)   Home Main Entrance   Number of Stairs, Main Entrance 4   Stair Railings, Main Entrance railing on  left side (ascending)   Stairs Comment, Main Entrance side or front entrance   Stairs Within Home, Secondary   Number of Stairs, Within Home, Secondary other (see comments)   Stairs Comment, Within Home, Secondary has stair lift for wife   Primary Bathroom Access   Location, Bathroom first (main) floor;second floor, must negotiate stairs to access   Bathroom Access Comment 1st floor powder room, tub shower, wife has shower bench, renovating master bedroom closet into bathroom, standard toilets, no grab bars   Patient Bedroom Access   Location, Patient Bedroom second floor, must negotiate stairs to access   Patient Bedroom Access Comment has bed on first floor   Kitchen Access   Location, Kitchen first (main) floor   Kitchen Access Comment pt completes IADLs at home   Cognition/Psychosocial   Comment, Cognition Oriented to date [facility] 15/15   Sensory   Additional Documentation Sensory Assessment (Somatosensory) (Group)   Vision Assessment/Intervention   Visual Impairment/Limitations WFL;corrective lenses for reading  (pt reports no other visual deficits)   Sensory Assessment (Somatosensory)   Sensory Assessment (Somatosensory) UE sensation intact   Sensory Subjective Reports   (no reports of numbess/tingling)   Range of Motion (ROM)   Range of Motion ROM is WFL;bilateral upper extremities   Strength (Manual Muscle Testing)   Strength (Manual Muscle Testing) bilateral upper extremities;strength is WFL   Strength Comprehensive (MMT)   Comment grossly WFL with ADLs/transfers   Bed Mobility   Assistive Device (Bed Mobility) bed rails;head of bed elevated   Comment (Bed Mobility) Min A rolling, supine> sit, log roll tech   Transfers   Transfers toilet transfer;shower transfer   Comment unsafe to assess without DME   Sit to Stand Transfer   Comment unsafe to assess without DME   Stand to Sit Transfer   Comment unsafe to assess without DME   Toilet Transfer   Comment unsafe to assess without DME   Shower Transfer   Comment  unsafe to assess without DME   Balance   Comment, Balance close S sitting EOB, Min A standing balance with RW for toileting   Motor Skills   Motor Skills functional endurance;coordination   Functional Endurance FMC: grossly WFL   Basic Activities of Daily Living (BADLs)   Basic Activities of Daily Living bathing;upper body dressing;lower body dressing;grooming;toileting;self-feeding   BADL Interventions BADL Safety/Performance (Group)   Bathing   Self-Performance chest;left arm;right arm;abdomen;front perineal area;left upper leg;right upper leg   [place] Assistance buttocks;left lower leg, including foot;right lower leg, including foot   [place] moderate assist (50-74% patient effort)   Position supported sitting   Setup Assistance adaptive equipment setup;obtain supplies   Adaptive Equipment grab bar/tub rail;hand-held shower spray hose;shower chair   Comment shower seated on tub bench, incisions covered, LSO donned for transfer   Upper Body Dressing   Self-Performance threads left arm, shirt;threads right arm, shirt;pulls shirt over head/around back;pulls shirt down/adjusts   [place] Assistance obtains clothes;orthosis application   [place] minimum assist (75% or more patient effort);moderate assist (50-74% patient effort)   Position unsupported sitting   Comment A for LSO brace   Lower Body Dressing   Self-Performance pulls underpants up or down;pulls pants/shorts up or down   [place] Assistance obtains clothes;threads left leg, underpants;threads right leg, underpants;pulls underpants up or down;threads left leg, pants/shorts;threads right leg, pants/shorts;pulls pants/shorts up or down;dons/doffs left sock;dons/doffs right sock   [place] dependent (less than 25% patient effort)   Position unsupported sitting;supported standing   Adaptive Equipment   (would benefit from AE)   [place], Footwear dependent (less than 25% patient effort)   Comment pt able to A with CM only with min A for balance    Grooming   Self-Performance washes, rinses and dries face;washes, rinses and dries hands;brushes/pressley hair;oral care (brushing teeth, cleaning dentures)   [place] set up;minimum assist (75% or more patient effort)   Position supported standing;sink side   [place], Oral Hygiene set up;minimum assist (75% or more patient effort)   Comment min A for balance in stance   Toileting   [place] moderate assist (50-74% patient effort)   Position unsupported sitting;supported standing   Setup Assistance adaptive equipment setup;obtain supplies   Adaptive Equipment accessible height toilet;grab bar/safety frame  (RW)   Self-Feeding   Comment I with breakfast   BADL Safety/Performance   Impairments, BADL Safety/Performance balance;endurance/activity tolerance;motor control;motor planning;pain;strength   Orthotic Management   Orthosis Location spinal orthosis   Spinal Orthosis Management   Type (Spinal Orthosis) LSO (lumbar sacral orthosis)   Therapeutic Indications Spinal Orthosis post-op positioning/protection   Wearing Schedule (Spinal Orthosis) wear when out of bed only;remove for hygiene/bathing   IRF OT Goals   Transfer Goal Selection (OT-IRF) transfers, OT goal 1;transfers, OT goal 2;transfers, OT goal 3;transfers, OT goal 4   Bathing Goal Selection (OT-IRF) bathing, OT goal 1;bathing, OT goal 2   UB Dressing Goal Selection (OT-IRF) UB dressing, OT goal 1;UB dressing, OT goal 2   LB Dressing Goal Selection (OT-IRF) LB dressing, OT goal 1;LB dressing, OT goal 2   Grooming Goal Selection (OT-IRF) grooming, OT goal 1;grooming, OT goal 2   Toileting Goal Selection (OT-IRF) toileting, OT goal 1;toileting, OT goal 2   Transfer Goal 1   Activity/Assistive Device toilet   [place] supervision required   Time Frame short-term goal (STG);1 week   Transfer Goal 2   Activity/Assistive Device toilet   [place] modified independence   Time Frame long-term goal (LTG);2 weeks   Transfer Goal 3    Activity/Assistive Device shower   [place] set-up required;supervision required   Time Frame short-term goal (STG);1 week   Transfer Goal 4   Activity/Assistive Device shower   [place] set-up required;supervision required   Time Frame long-term goal (LTG);2 weeks   Bathing Goal 1   [place] minimum assist (75% or more patient effort)   Time Frame short-term goal (STG);1 week   Bathing Goal 2   [place] supervision required;set-up required   Time Frame short-term goal (STG);1 week   UB Dressing Goal 1   [place] set-up required;supervision required   Time Frame short-term goal (STG);1 week   UB Dressing Goal 2   [place] modified independence   Time Frame long-term goal (LTG);2 weeks   LB Dressing Goal 1   [place] moderate assist (50-74% patient effort)   Time Frame short-term goal (STG);1 week   LB Dressing Goal 2   [place] modified independence   Time Frame long-term goal (LTG);2 weeks   Grooming Goal 1   [place] supervision required   Time Frame short-term goal (STG);1 week   Strategies/Barriers in stance   Grooming Goal 2   [place] modified independence   Time Frame long-term goal (LTG);2 weeks   Strategies/Barriers in stance   Toileting Goal 1   [place] minimum assist (75% or more patient effort)   Time Frame short-term goal (STG);1 week   Toileting Goal 2   [place] modified independence   Time Frame long-term goal (LTG);2 weeks   Patient/Family Goals   Patient's Goals For Discharge return home;take care of myself at home;return to all previous roles/activities  (to be able to do stairs)   Therapy Assessment/Plan (OT)   Rehab Potential/Prognosis (OT) good, to achieve stated therapy goals   Frequency of Treatment (OT) 60-90 minutes per day;5-7 times per week   Estimated Length of Stay 2 weeks   Problem List (OT) problems related to;balance;mobility;pain;strength   Activity Limitations Related to Problem List unable to ambulate safely;unable to transfer  safely;BADLs not performed adequately or safely;IADLs not performed adequately or safely;community activities not performed adequately or safely;home management activity not performed adequately or safely   Planned Therapy Interventions (OT) activity tolerance training;adaptive equipment training;BADL retraining;functional balance retraining;IADL retraining;occupation/activity based interventions;patient/caregiver education/training;strengthening exercise;transfer/mobility retraining   Comment, Therapy Assessment/Plan (OT) Jael Herman is a 72 y/o m s/p  L3-5 PLDF, L4-5 posterior lumbar fusion with instrumentation. Pt presents with decreased strength, balance, endurance, increased pain, and spinal precautions, limiting PLOF with self-care, functional transfers, and functional mobility. Pt was I PTA without A.D, I with ADLs/IADLs. Pt will benefit from IP OT services to address above listed deficits and maximize functional I and safety prior to d/c home.                    Education provided this session. See the Patient Education summary report for full details.    IRF OT Goals      Most Recent Value   Transfer Goal 1   Activity/Assistive Device  toilet at 07/15/2020 0740   [place]  supervision required at 07/15/2020 0740   Time Frame  short-term goal (STG), 1 week at 07/15/2020 0740   Transfer Goal 2   Activity/Assistive Device  toilet at 07/15/2020 0740   [place]  modified independence at 07/15/2020 0740   Time Frame  long-term goal (LTG), 2 weeks at 07/15/2020 0740   Transfer Goal 3   Activity/Assistive Device  shower at 07/15/2020 0740   [place]  set-up required, supervision required at 07/15/2020 0740   Time Frame  short-term goal (STG), 1 week at 07/15/2020 0740   Transfer Goal 4   Activity/Assistive Device  shower at 07/15/2020 0740   [place]  set-up required, supervision required at 07/15/2020 0740   Time Frame  long-term goal (LTG), 2 weeks at 07/15/2020 0740   Bathing Goal 1    [place]  minimum assist (75% or more patient effort) at 07/15/2020 0740   Time Frame  short-term goal (STG), 1 week at 07/15/2020 0740   Bathing Goal 2   [place]  supervision required, set-up required at 07/15/2020 0740   Time Frame  short-term goal (STG), 1 week at 07/15/2020 0740   UB Dressing Goal 1   [place]  set-up required, supervision required at 07/15/2020 0740   Time Frame  short-term goal (STG), 1 week at 07/15/2020 0740   UB Dressing Goal 2   [place]  modified independence at 07/15/2020 0740   Time Frame  long-term goal (LTG), 2 weeks at 07/15/2020 0740   LB Dressing Goal 1   [place]  moderate assist (50-74% patient effort) at 07/15/2020 0740   Time Frame  short-term goal (STG), 1 week at 07/15/2020 0740   LB Dressing Goal 2   [place]  modified independence at 07/15/2020 0740   Time Frame  long-term goal (LTG), 2 weeks at 07/15/2020 0740   Grooming Goal 1   [place]  supervision required at 07/15/2020 0740   Time Frame  short-term goal (STG), 1 week at 07/15/2020 0740   Strategies/Barriers  in stance at 07/15/2020 0740   Grooming Goal 2   [place]  modified independence at 07/15/2020 0740   Time Frame  long-term goal (LTG), 2 weeks at 07/15/2020 0740   Strategies/Barriers  in stance at 07/15/2020 0740   Toileting Goal 1   [place]  minimum assist (75% or more patient effort) at 07/15/2020 0740   Time Frame  short-term goal (STG), 1 week at 07/15/2020 0740   Toileting Goal 2   [place]  modified independence at 07/15/2020 0740   Time Frame  long-term goal (LTG), 2 weeks at 07/15/2020 0740

## 2020-07-15 NOTE — PLAN OF CARE
Problem: Rehabilitation (IRF) Plan of Care  Goal: Plan of Care Review  Outcome: Progressing  Flowsheets (Taken 7/15/2020 1140)  Plan of Care Reviewed With: patient  IRF Plan of Care Review: progress ongoing, continue  Outcome Summary: Meds reviewed with pt

## 2020-07-16 ENCOUNTER — APPOINTMENT (INPATIENT)
Dept: WOUND CARE | Facility: REHABILITATION | Age: 70
DRG: 560 | End: 2020-07-16
Payer: MEDICARE

## 2020-07-16 ENCOUNTER — APPOINTMENT (INPATIENT)
Dept: PHYSICAL THERAPY | Facility: REHABILITATION | Age: 70
DRG: 560 | End: 2020-07-16
Payer: MEDICARE

## 2020-07-16 ENCOUNTER — APPOINTMENT (INPATIENT)
Dept: OCCUPATIONAL THERAPY | Facility: REHABILITATION | Age: 70
DRG: 560 | End: 2020-07-16
Payer: MEDICARE

## 2020-07-16 PROCEDURE — 97535 SELF CARE MNGMENT TRAINING: CPT | Mod: GO

## 2020-07-16 PROCEDURE — 12800001 HC ROOM AND CARE SEMIPRIVATE REHAB-BRAIN INJ

## 2020-07-16 PROCEDURE — 97530 THERAPEUTIC ACTIVITIES: CPT | Mod: GO

## 2020-07-16 PROCEDURE — 97110 THERAPEUTIC EXERCISES: CPT | Mod: GP,CQ

## 2020-07-16 PROCEDURE — 63700000 HC SELF-ADMINISTRABLE DRUG: Performed by: PHYSICAL MEDICINE & REHABILITATION

## 2020-07-16 PROCEDURE — 97110 THERAPEUTIC EXERCISES: CPT | Mod: GO

## 2020-07-16 PROCEDURE — 63600000 HC DRUGS/DETAIL CODE: Performed by: HOSPITALIST

## 2020-07-16 PROCEDURE — 63700000 HC SELF-ADMINISTRABLE DRUG: Performed by: HOSPITALIST

## 2020-07-16 PROCEDURE — 97116 GAIT TRAINING THERAPY: CPT | Mod: GP

## 2020-07-16 PROCEDURE — 97530 THERAPEUTIC ACTIVITIES: CPT | Mod: GP,CQ

## 2020-07-16 RX ORDER — LANOLIN ALCOHOL/MO/W.PET/CERES
1000 CREAM (GRAM) TOPICAL DAILY
Status: DISCONTINUED | OUTPATIENT
Start: 2020-07-16 | End: 2020-07-24 | Stop reason: HOSPADM

## 2020-07-16 RX ADMIN — PANTOPRAZOLE SODIUM 40 MG: 40 TABLET, DELAYED RELEASE ORAL at 08:52

## 2020-07-16 RX ADMIN — SENNOSIDES AND DOCUSATE SODIUM 1 TABLET: 8.6; 5 TABLET ORAL at 08:52

## 2020-07-16 RX ADMIN — HEPARIN SODIUM 5000 UNITS: 5000 INJECTION INTRAVENOUS; SUBCUTANEOUS at 15:38

## 2020-07-16 RX ADMIN — ONDANSETRON 4 MG: 4 TABLET, ORALLY DISINTEGRATING ORAL at 19:14

## 2020-07-16 RX ADMIN — SENNOSIDES AND DOCUSATE SODIUM 1 TABLET: 8.6; 5 TABLET ORAL at 21:06

## 2020-07-16 RX ADMIN — FAMOTIDINE 20 MG: 20 TABLET ORAL at 08:51

## 2020-07-16 RX ADMIN — LOSARTAN POTASSIUM 50 MG: 50 TABLET, FILM COATED ORAL at 08:53

## 2020-07-16 RX ADMIN — CYANOCOBALAMIN TAB 1000 MCG 1000 MCG: 1000 TAB at 19:25

## 2020-07-16 RX ADMIN — ONDANSETRON 4 MG: 4 TABLET, ORALLY DISINTEGRATING ORAL at 08:52

## 2020-07-16 RX ADMIN — ROSUVASTATIN CALCIUM 5 MG: 5 TABLET, FILM COATED ORAL at 08:53

## 2020-07-16 RX ADMIN — MELATONIN 1000 UNITS: at 08:52

## 2020-07-16 RX ADMIN — HEPARIN SODIUM 5000 UNITS: 5000 INJECTION INTRAVENOUS; SUBCUTANEOUS at 21:06

## 2020-07-16 RX ADMIN — FAMOTIDINE 20 MG: 20 TABLET ORAL at 21:06

## 2020-07-16 RX ADMIN — HEPARIN SODIUM 5000 UNITS: 5000 INJECTION INTRAVENOUS; SUBCUTANEOUS at 05:56

## 2020-07-16 RX ADMIN — POLYETHYLENE GLYCOL 3350 17 G: 17 POWDER, FOR SOLUTION ORAL at 08:54

## 2020-07-16 RX ADMIN — TAMSULOSIN HYDROCHLORIDE 0.4 MG: 0.4 CAPSULE ORAL at 21:06

## 2020-07-16 NOTE — PLAN OF CARE
Problem: Rehabilitation (IRF) Plan of Care  Goal: Plan of Care Review  Outcome: Progressing  Flowsheets (Taken 7/16/2020 1012)  Plan of Care Reviewed With: patient  IRF Plan of Care Review: progress ongoing, continue  Outcome Summary: Pt AAOx3; continent B/B; complains of sharp, shooting pains in LUQ-bowel sounds active, medications administered; medications and care provided reviewed; no other concerns at this time.

## 2020-07-16 NOTE — PROGRESS NOTES
Patient: Jael Herman  Location: [facility]  MRN: [id number]  Today's date: 7/16/2020    History of Present Illness  Jael Herman is a 70 y.o. male whose primary indication for inpatient rehabilitation is Spinal Cord, Non-Traumatic.  Pertinent History of Current Functional Problem:  He has a history of Spinal stenosis of lumbar region with neurogenic claudication [M48.062] and was admitted for a lumbar spinal fusion. Underwent L3-5 PLDF (posterior lumbar decompression, L4-5 posterior lumbar fusion with instrumentation.  by Dr. Gregorio Lancaster 7/6/20 at [facility]. Post op he had anemia but did not require transfusion. His pain was managed with Tylenol, Neurontin, Flexeril and Dilaudid.  He was put on SQ Heparin for DVT ppx. The patient was seen by PM&R and found to have residual deficits in ambulation and ADLs due to S/P lumbar spinal fusion [Z98.1] and was transferred to [facility] on 7/14/2020 for acute inpatient rehab.      Past Medical History  Jael has a past medical history of BPH (benign prostatic hyperplasia), DJD (degenerative joint disease), Hypertension, Lumbar stenosis, and BEN (obstructive sleep apnea).    PT Vitals    Date/Time Pulse HR Source BP BP Location BP Method Pt Position [facility]   07/16/20 1011 101 Monitor 115/72 Right upper arm Automatic Sitting MAGED      PT Pain    Date/Time Pain Type Pref Pain Scale Location Rating: Rest [facility]   07/16/20 1011 Pain Assessment number (Numeric Rating Pain Scale) back 5 MAGED          Prior Living Environment      Most Recent Value   Lives With  spouse   Living Arrangements  house   Home Accessibility  stairs to enter home (Group)   Number of Stairs, Main Entrance  4   Surface of Stairs, Main Entrance  concrete   Stair Railings, Main Entrance  railing on left side (ascending)   Landing, Stairs, Main Entrance  railings present   Stairs Comment, Main Entrance  side or front entrance   Location, Kitchen  first  (main) floor   Kitchen Access Comment  pt completes IADLs at home   Location, Patient Bedroom  second floor, must negotiate stairs to access   Patient Bedroom Access Comment  has bed on first floor   Location, Bathroom  first (main) floor, second floor, must negotiate stairs to access   Bathroom Access Comment  1st floor powder room, tub shower, wife has shower bench, renovating master bedroom closet into bathroom, standard toilets, no grab bars   Number of Stairs, Within Home, Secondary  other (see comments)   Stairs Comment, Within Home, Secondary  has stair lift for wife          Prior Level of Function      Most Recent Value   Dominant Hand  right   Ambulation  independent   Transferring  independent   Toileting  independent   Bathing  independent   Dressing  independent   Eating  independent   Equipment Currently Used at Home  none          IRF PT Evaluation and Treatment - 07/16/20 1011        Time Calculation    Start Time  1000     Stop Time  1030     Time Calculation (min)  30 min        Session Details    Document Type  daily treatment/progress note     Mode of Treatment  physical therapy;individual therapy        Bed Mobility    [place], Supine to Sit  touching/steadying assist     Verbal Cues (Supine to Sit)  technique;safety;maintaining precautions     [place], Sit to Supine  touching/steadying assist     Verbal Cues (Sit to Supine)  technique;maintaining precautions     Comment (Bed Mobility)  Touching assist for bed mobility on therapy mat        Transfers    Transfers  stand pivot transfer        Stand Pivot Transfer    [place], Stand Pivot/Stand Step Transfer  minimum assist (75% or more patient effort)     Verbal Cues  safety;technique     Assistive Device  walker, front-wheeled     Comment  SPT from WC<>therapy mat, Min A for balance correction and safety        Lower Extremity (Therapeutic Exercise)    Exercise Position/Type (LE Therapeutic Exercise)  supine     General Exercise  (LE Therapeutic Exercise)  bilateral;gluteal sets;SAQ (short arc quad);heel slides     Comment (LE Therapeutic Exercise)  Supine TE: SAQ, heel slides, lateral heel slides, glute sets 3x15 each.         Daily Progress Summary (PT)    Daily Outcome Statement (PT)  Pt received from PT stating BP fluctuations. Completed mat program for LE strengthening and he tolerates well with no increase in pain. Reports lightheadedness with position changes. Cont per POC as tolerated.                            IRF PT Goals      Most Recent Value   Bed Mobility Goal 1   Activity/Assistive Device  bed mobility activities, all at 07/15/2020 1035   [place]  supervision required, verbal cues required at 07/15/2020 1035   Time Frame  short-term goal (STG), 5 - 7 days at 07/15/2020 1035   Bed Mobility Goal 2   Activity/Assistive Device  bed mobility activities, all at 07/15/2020 1035   [place]  modified independence at 07/15/2020 1035   Time Frame  long-term goal (LTG), 14 days or less at 07/15/2020 1035   Transfer Goal 1   Activity/Assistive Device  sit-to-stand/stand-to-sit, bed-to-chair/chair-to-bed, stand pivot, car transfer at 07/15/2020 1035   [place]  supervision required, verbal cues required at 07/15/2020 1035   Time Frame  short-term goal (STG), 5 - 7 days at 07/15/2020 1035   Transfer Goal 2   Activity/Assistive Device  sit-to-stand/stand-to-sit, bed-to-chair/chair-to-bed, stand pivot, car transfer at 07/15/2020 1035   [place]  modified independence at 07/15/2020 1035   Time Frame  long-term goal (LTG), 14 days or less at 07/15/2020 1035   Gait/Walking Locomotion Goal 1   Activity/Assistive Device  gait (walking locomotion), diminish gait deviation, increase endurance/gait distance, improve balance and speed at 07/15/2020 1035   Distance  150 feet at 07/15/2020 1035   [place]  supervision required, verbal cues required at 07/15/2020 1035   Time Frame  short-term goal (STG), 5 - 7 days at 07/15/2020 1035    Gait/Walking Locomotion Goal 2   Activity/Assistive Device  gait (walking locomotion), diminish gait deviation, increase endurance/gait distance, improve balance and speed at 07/15/2020 1035   Distance  250 feet at 07/15/2020 1035   [place]  modified independence at 07/15/2020 1035   Time Frame  long-term goal (LTG), 14 days or less at 07/15/2020 1035   Stairs Goal 1   Activity/Assistive Device  stairs, all skills, using handrail, left, using handrail, right at 07/15/2020 1035   Number of Stairs  12 at 07/15/2020 1035   [place]  supervision required, verbal cues required at 07/15/2020 1035   Time Frame  short-term goal (STG), 5 - 7 days at 07/15/2020 1035   Stairs Goal 2   Activity/Assistive Device  stairs, all skills [using 1 railing.] at 07/15/2020 1035   Number of Stairs  12 at 07/15/2020 1035   [place]  modified independence at 07/15/2020 1035   Time Frame  long-term goal (LTG), 14 days or less at 07/15/2020 1035   Problem Specific Goal 1   Problem-Specific Goal 1  Pt. will demonstrate an improvement in standing balance as evidenced by a Burrell Balance Test score of > than or = to 32/56. at 07/15/2020 1035   Time Frame  short-term goal (STG), 5 - 7 days at 07/15/2020 1035   Problem Specific Goal 2   Problem-Specific Goal 2  Pt. will demonstrate an improvement in standing balance as evidenced by a Burrell Balance Test score of > than or = to 41/56, indicating a low falls risk. at 07/15/2020 1035   Time Frame  long-term goal (LTG), 14 days or less at 07/15/2020 1035

## 2020-07-16 NOTE — PLAN OF CARE
Problem: Rehabilitation (IRF) Plan of Care  Goal: Plan of Care Review  Flowsheets (Taken 7/16/2020 3940)  Plan of Care Reviewed With: patient  IRF Plan of Care Review: progress ongoing, continue  Outcome Summary: Pt with orthostatic vitals this session. Able to complete session with tilted weightshifts for rest breaks. Continue monitoring upright tolerance and progress as able.

## 2020-07-16 NOTE — PLAN OF CARE
Problem: Rehabilitation (IRF) Plan of Care  Goal: Plan of Care Review  Outcome: Progressing  Flowsheets (Taken 7/16/2020 1240)  Plan of Care Reviewed With: patient  IRF Plan of Care Review: progress ongoing, continue  Outcome Summary: pt completed toileting with min A, toilet transfer with min A, and simulated LB dressing with Cl S/min A

## 2020-07-16 NOTE — CONSULTS
Wound Ostomy Continence Note    Subjective    HPI Patient is a 70 y.o. male who was admitted on 7/14/2020 with a diagnosis of S/P lumbar spinal fusion [Z98.1].    Problem list Problem List:   Patient Active Problem List   Diagnosis   • Spinal stenosis   • HTN (hypertension)   • DJD (degenerative joint disease)   • DDD (degenerative disc disease), cervical   • BEN (obstructive sleep apnea)   • S/P lumbar spinal fusion   • Benign prostatic hyperplasia with lower urinary tract symptoms      PMH/PSH Medical History:   Past Medical History:   Diagnosis Date   • BPH (benign prostatic hyperplasia)    • DJD (degenerative joint disease)    • Hypertension    • Lumbar stenosis    • BEN (obstructive sleep apnea)      Surgical History:   Past Surgical History:   Procedure Laterality Date   • POSTERIOR FUSION LUMBAR SPINE  07/06/2020    L3-5 PLDF      Assessment and Recommendation     Surgical Incision Back Lower (Active)   Incision WDL WDL 7/16/2020  2:27 PM   Dressing Appearance no drainage;dry;intact 7/16/2020  2:27 PM   Appearance no drainage;no redness;sutures intact;well approximated 7/16/2020  2:27 PM   Drainage Characteristics/Odor no odor 7/16/2020  2:27 PM   Drainage Amount none 7/16/2020  2:27 PM   Wound Cleaning cleansed with;sterile normal saline 7/16/2020  2:27 PM   Dressing Dressing removed;Dressing applied;gauze 7/16/2020  2:27 PM      Seen for skin assessment. PI prevention education given. Patient requests dressing on back incision. Sutures present, approximated, no drainage noted. Rinsed with NSS. Gauze and paper tape applied. Placed on side with pillows and heels offloaded. Suggest: monitor dressing daily, offload heels from bed, turn q 2 hours in bed, weight shift in wheelchair, q 30 minutes, monitor incision daily.     Date: 07/16/20  Signature: Pearl Cabral RN

## 2020-07-16 NOTE — PROGRESS NOTES
[facility] Progress Note          Patient was seen and examined.   Attestation Notes: Face to face encounter completed    Jael Herman is a 70 y.o. male who was admitted for S/P lumbar spinal fusion [Z98.1]. Patient was referred by Anoop Yeh MD for medical assessment and management      CC: S/P lumbar spinal fusion [Z98.1]     HPI: Jael Herman is a 70 y.o. male with HTN, HL, BPH, BEN (no CPAP), and lumbar stenosis who underwent L3-5 PLDF by Dr. Gregorio Lancaster 7/6/20 at [facility]. Post op he had anemia but did not require transfusion. His pain was managed with Tylenol, Neurontin, Flexeril and Dilaudid.  He was put on SQ Heparin for DVT ppx.      He remained on Losartan for HTN and Crestor for HL. He was also on Flomax for BPH.      He had some post op abdominal discomfort and nausea. Xrays negative for obstruction. Amylase and lipase were not elevated. He was treated with Senokot, Colace, Miralax and Dulcolax suppositories for constipation.      The patient was seen by PM&R and found to have residual deficits in ambulation and ADLs due to S/P lumbar spinal fusion [Z98.1] and came to [facility] on 7/14/2020 for acute inpatient rehab.     He participated in therapy.     SUBJECTIVE:  Patient interviewed and examined     He still complains of LLQ pain, no nausea     Pain stable, no new weakness or numbness, no dysuria, no chest pain, palpitations, dyspnea or fevers    Review of Systems:  All other systems reviewed and negative except as noted in the HPI.    Current meds and allergies reviewed    Past Medical History:   Diagnosis Date   • BPH (benign prostatic hyperplasia)    • DJD (degenerative joint disease)    • Hypertension    • Lumbar stenosis    • BEN (obstructive sleep apnea)      Past Surgical History:   Procedure Laterality Date   • POSTERIOR FUSION LUMBAR SPINE  07/06/2020    L3-5 PLDF     Social History     Tobacco Use   • Smoking status: Never  Smoker   • Smokeless tobacco: Never Used   Substance Use Topics   • Alcohol use: Not Currently   • Drug use: Never      History reviewed. No pertinent family history.    Vital signs in last 24 hours:  Temp:  [36.8 °C (98.2 °F)-37.6 °C (99.6 °F)] 37 °C (98.6 °F)  Heart Rate:  [] 109  Resp:  [17-18] 18  BP: ()/(52-78) 91/52  Vital signs reviewed 07/16/20 2:41 PM    Physical Exam   Constitutional: He is oriented to person, place, and time. He appears well-developed and well-nourished.   HENT:   Head: Normocephalic and atraumatic.   Right Ear: External ear normal.   Left Ear: External ear normal.   Nose: Nose normal.   Mouth/Throat: Oropharynx is clear and moist.   Eyes: Pupils are equal, round, and reactive to light. Conjunctivae and EOM are normal.   Neck: Normal range of motion. Neck supple.   Cardiovascular: Normal rate, regular rhythm, normal heart sounds and intact distal pulses. Exam reveals no gallop and no friction rub.   No murmur heard.  Pulmonary/Chest: Effort normal and breath sounds normal. No stridor. No respiratory distress. He has no wheezes. He has no rales.   Abdominal: Soft. Bowel sounds are normal. He exhibits no distension. There is no tenderness.   Musculoskeletal: He exhibits edema and deformity (s/p lumbar fusion).   Neurological: He is alert and oriented to person, place, and time.   Skin: Skin is warm and dry.   Psychiatric: He has a normal mood and affect. His behavior is normal.   Nursing note and vitals reviewed.              Objective    Labs:  I have reviewed the patient's labs.  Significant abnormals are anemia.  Results from last 7 days   Lab Units 07/15/20  0527   WBC K/uL 8.99   HEMOGLOBIN g/dL 11.8*   HEMATOCRIT % 35.5*   PLATELETS K/uL 385*     Results from last 7 days   Lab Units 07/15/20  0527   SODIUM mEQ/L 137   POTASSIUM mEQ/L 4.2   CHLORIDE mEQ/L 105   CO2 mEQ/L 24   BUN mg/dL 19   CREATININE mg/dL 1.1   CALCIUM mg/dL 9.3   ALBUMIN g/dL 3.5   BILIRUBIN TOTAL mg/dL  0.8   ALK PHOS IU/L 54   ALT IU/L 44   AST IU/L 26   GLUCOSE mg/dL 108*     Results from last 7 days   Lab Units 07/15/20  0527   AMYLASE U/L 112*     Lab Results   Component Value Date    LIPASE 34 07/15/2020     Lab Results   Component Value Date    VKICHPCJ79 112 (L) 07/15/2020       Imagin/15/20: obstruction series:  IMPRESSION:   1.  No acute cardiopulmonary disease.  2.  Possible mild ileus.      ASSESSMENT/PLAN:    70 y.o. male  with HTN, HL, BPH, BEN (no CPAP), and lumbar stenosis who underwent L3-5 PLDF by Dr. Gregorio Lancaster 20 at [facility]     1. Neuro:  -lumbar stenosis s/p L3-5 PLDF  -Tylenol and Dilaudid prn pain     2. Vasc:  -bilat LE edema  -SCDs and SQ Heparin for DVT ppx  -doppler US bilat LE's neg. 7/15/20     3. Heme:  -anemia due to blood loss, no iron due to GI distress  -B12 low, added oral B12  -follow CBC     4. Renal:  -increased risk of dehydration and electrolyte changes  -follow BMP, Mg     5. Gi:  -abdominal pain since surgery, repeated xrays without obstruction, seen by general surgery  -7/15/20 obstruction series with mild ileus  -7/15/20 amylase slightly elevated and lipase normal, not likely cause of discomfort  -Senokot-S and Miralax for bowels  -Protonix and Pepcid for GERD and ulcer ppx     6. Gu:  -BPH on Flomax  -no UTI or retention     7. Cardiac:  -HTN on Losartan  -watch for orthostatic hypotension  -use cardiac precautions in therapy     8. Pulm:  -BEN but no CPAP  -incentive spirometry for atelectasis     9. Derm:  -consulted [facility] for skin assessment     10. Nutrition:  -seen by Nutrition for assessment and education     11. Endo:  -HL on Crestor     12. Psych:  -seen by Psychology for support     plan discussed with patient, nurse, case management and Anoop Yeh MD Scott Sapperstein, MD  2020  2:41 PM

## 2020-07-16 NOTE — PROGRESS NOTES
Subjective    Patient seen and examined on rounds.  Chart reviewed.  Events overnight noted.  History reviewed briefly with patient.    CC:  Deficits in mobility, transfers, self-care status post posterior lumbar decompression and fusion, lumbar stenosis, lumbar radiculopathy    HPI:  Mr. Jael Herman  is a 70-year-old right-handed -American gentleman with chronic conditions significant for hypertension, hyperlipidemia, benign process hypertrophy, sleep apnea, lumbar stenosis who had chronic low back pain for over 20 years and pain extending down his lower extremities especially right lower extremity with progressive weakness in his right lower extremity over the past year and he says he was noted to have a right foot drop.  He was diagnosed with lumbar spinal stenosis and underwent L3-5 PLDF by Dr. Gregorio Lancaster on 7/6/20 at [facility].  Postoperatively he is allowed weightbearing as tolerated on both lower extremities.  He was recommended a LSO brace for use when out of bed.  He did have urinary frequency postoperatively.  Drains were removed on 7/12/20.  Postoperative course was significant for abdominal pain and he reports multiple imaging studies were done of his abdomen and he also had barium follow-through.  He was seen by surgical team for his abdominal pain.  He reports abdominal x-rays were negative for obstruction.  Amylase, lipase were not elevated.  He says that when he takes Gabapentin he gets abdominal pain and he noticed that even before his surgery.  We will hold that for now.  Also he will be kept on Protonix/Pepcid.  He is on subcutaneous Heparin for DVT prophylaxis.  He is also on Dilaudid and Flexeril for pain control. He has been kept on bowel medications for his constipation issues.  He still reports ongoing abdominal pain and I mentioned to him that we will get another x-ray of his abdomen tomorrow.  If his abdominal pain gets worse, he may need to go to the  "ER for evaluation. On 7/10/20, hemoglobin was 10.1, WBC count 8.3, BUN 15, creatinine 0.9.  He has been needing assistance for mobility, transfers, self-care postoperatively. He is transferred to [facility] on 7/14/20 for further rehabilitation care.    SUBJ: Tolerating therapies per endurance.  He reports he still had some abdominal pain today but is decreasing.  Abdominal x-rays were ordered.  Tolerating diet.    ROS: Denies chest pain or shortness of breath. Other ROS negative. Past, family, social history is unchanged.      Physical Exam      Blood pressure 114/70, pulse (!) 101, temperature 37.6 °C (99.6 °F), temperature source Oral, resp. rate 17, height 1.803 m (5' 11\"), weight 81.6 kg (180 lb), SpO2 96 %.  Body mass index is 25.1 kg/m².    General Appearance: Not in acute distress  Head/Ear/Nose/Throat: Normocephalic; Atraumatic.   Eye: EOMI; PERRL.   Neck: No JVD; No Bruits.   Respiratory: Decreased breath sounds at bases.   Cardiovascular: RRR; Normal S1, S2.   Gastrointestinal: Soft; NT; +BS.   Extremities: Bilateral lower extremity edema noted.  Incision lumbar spine covered by dressing.  Musculoskeletal: Functional active range of motion in both upper extremities.  He is able to do active straight leg raise in both lower extremities but has weakness in right ankle dorsiflexion and he says he has a known right foot drop.  Neurological: AAO ×3. Speech is fluent. Cranial nerve examination does not reveal any gross facial asymmetry. Strength testing about 4+/5 strength in both upper extremities.  Bilateral hip flexion is 4/5.  Bilateral quadriceps are 4/5.  Right ankle dorsiflexion is 2+/5.  Right ankle plantar flexion is 4+/5.  Left ankle dorsi and plantar flexion of 4+/5.  He is grossly able to localize touch and position sense in great toes but has some numbness in his legs and feet.  Deep tendon reflexes are hypoactive and symmetric bilaterally.  Plantars are flexor.  Coordination is " functional upper extremities.    Behavior/Emotional: Appropriate; Cooperative.   Skin: No obvious rashes noted.  Incision lumbar spine covered with dressing.      Current Facility-Administered Medications:   •  acetaminophen (TYLENOL) tablet 650 mg, 650 mg, oral, q4h PRN, Fredis Lyles MD  •  albuterol HFA (VENTOLIN HFA) 90 mcg/actuation inhaler 2 puff, 2 puff, inhalation, q6h PRN, Fredis Lyles MD  •  alum-mag hydroxide-simeth (MAALOX) 200-200-20 mg/5 mL suspension 30 mL, 30 mL, oral, q6h PRN, Fredis Lyles MD  •  bisacodyL (DULCOLAX) 10 mg suppository 10 mg, 10 mg, rectal, Daily PRN, Fredis Lyles MD  •  calcium carbonate (TUMS) chewable tablet 1,000 mg, 1,000 mg, oral, 3x daily PRN, Fredis Lyles MD  •  cholecalciferol (vitamin D3) tablet 1,000 Units, 1,000 Units, oral, Daily, Fredis Lyles MD, 1,000 Units at 07/15/20 0838  •  cyclobenzaprine (FLEXERIL) tablet 10 mg, 10 mg, oral, 3x daily PRN, Fredis Lylse MD  •  famotidine (PEPCID) tablet 20 mg, 20 mg, oral, BID, Fredis Lyles MD, 20 mg at 07/15/20 2115  •  heparin (porcine) 5,000 unit/mL injection 5,000 Units, 5,000 Units, subcutaneous, q8h CARRIE, Fredis Lyles MD, 5,000 Units at 07/15/20 2115  •  HYDROmorphone (DILAUDID) tablet 2-4 mg, 2-4 mg, oral, q4h PRN, Fredis Lyles MD, 4 mg at 07/15/20 1242  •  losartan (COZAAR) tablet 50 mg, 50 mg, oral, Daily, Fredis Lyles MD, 50 mg at 07/15/20 0839  •  ondansetron ODT (ZOFRAN-ODT) disintegrating tablet 4 mg, 4 mg, oral, q8h PRN, Escobar Palmer MD, 4 mg at 07/15/20 0536  •  pantoprazole (PROTONIX) tablet,delayed release (DR/EC) 40 mg, 40 mg, oral, Daily before breakfast, Fredis Lyles MD, 40 mg at 07/15/20 0839  •  polyethylene glycol (MIRALAX) 17 gram packet 17 g, 17 g, oral, Daily, Fredis Lyles MD, 17 g at 07/15/20 0840  •  polyethylene glycol (MIRALAX) 17 gram packet 17 g, 17 g, oral, Daily PRN, Fredis Lyles MD  •  rosuvastatin  (CRESTOR) tablet 5 mg, 5 mg, oral, Daily, Fredis Lyles MD, 5 mg at 07/15/20 0840  •  sennosides-docusate sodium (SENOKOT-S) 8.6-50 mg per tablet 1 tablet, 1 tablet, oral, BID, Fredis Lyles MD, 1 tablet at 07/15/20 2115  •  tamsulosin (FLOMAX) 24 hr ER capsule 0.4 mg, 0.4 mg, oral, Nightly, Fredis Lyles MD, 0.4 mg at 07/15/20 2115       Labs / Radiology    Lab Results   Component Value Date    WBC 8.99 07/15/2020    HGB 11.8 (L) 07/15/2020    HCT 35.5 (L) 07/15/2020    MCV 93.9 07/15/2020     (H) 07/15/2020     Lab Results   Component Value Date    GLUCOSE 108 (H) 07/15/2020    CALCIUM 9.3 07/15/2020     07/15/2020    K 4.2 07/15/2020    CO2 24 07/15/2020     07/15/2020    BUN 19 07/15/2020    CREATININE 1.1 07/15/2020       Assessment and Plan    ASSESSMENT PLAN:  1. 70-year-old right-handed -American gentleman with chronic conditions significant for hypertension, hyperlipidemia, benign process hypertrophy, sleep apnea, lumbar stenosis who had chronic low back pain for over 20 years and pain extending down his lower extremities especially right lower extremity with progressive weakness in his right lower extremity over the past year and he says he was noted to have a right foot drop.  He was diagnosed with lumbar spinal stenosis and underwent L3-5 PLDF by Dr. Gregorio Lancaster on 7/6/20 at [facility].  Postoperatively he is allowed weightbearing as tolerated on both lower extremities.  He was recommended a LSO brace for use when out of bed.  He did have urinary frequency postoperatively.  Drains were removed on 7/12/20.  Postoperative course was significant for abdominal pain and he reports multiple imaging studies were done of his abdomen and he also had barium follow-through.  He was seen by surgical team for his abdominal pain.  He reports abdominal x-rays were negative for obstruction.  Amylase, lipase were not elevated.  He says that when he takes  Gabapentin he gets abdominal pain and he noticed that even before his surgery.  We will hold that for now.  Also he will be kept on Protonix/Pepcid.  He is on subcutaneous Heparin for DVT prophylaxis.  He is also on Dilaudid and Flexeril for pain control. He has been kept on bowel medications for his constipation issues.  He still reports ongoing abdominal pain and I mentioned to him that we will get another x-ray of his abdomen tomorrow.  If his abdominal pain gets worse, he may need to go to the ER for evaluation. On 7/10/20, hemoglobin was 10.1, WBC count 8.3, BUN 15, creatinine 0.9.  He has been needing assistance for mobility, transfers, self-care postoperatively. He is transferred to [facility] on 7/14/20 for further rehabilitation care.     2. DVT prophylaxis - on Heparin.  On SCDs.  Platelets 385 on 7/15/2020.     3.  Lumbar stenosis - status post posterior lumbar decompression and fusion, lumbar stenosis, lumbar radiculopathy - continue PT, OT, psychology.  Monitor healing of lumbar incision.  He has chronic right foot drop.     4. GI - On Protonix for GI prophylaxis. On Pepcid.  MiraLAX, Senokot-S.  On PRN Dulcolax.  On PRN Maalox.  On Tums when necessary.  On PRN MiraLAX.  We will get abdominal x-ray tomorrow due to ongoing abdominal pain.     5.  - voiding.  Denies dysuria.  Monitor post void residuals.  He has some urinary frequency.  On Flomax.     6.  Hypertension -  on Cozaar.     7. Pain - on when necessary Dilaudid.  On Flexeril PRN.  On Tylenol PRN.  Gabapentin was discontinued as patient says it causes abdominal pain for him.  He noticed this even prior to his surgery per patient.     8. Hematology -hemoglobin 11.8, WBC 8.99 on 11/15/2020.     9.  Hyperlipidemia - on Crestor.      10. F/E/N - on vitamin D.     11. Rehabilitation medicine - Continue comprehensive rehabilitation care. Continue PT, OT, speech, psychology.  We will follow falls precautions, cardiac precautions,  monitor pulse oximetry in therapy and follow aspiration precautions.  We will follow spinal precautions.He reports he still had some abdominal pain today but is decreasing.  Abdominal x-rays were ordered.  Tolerating diet.     12. Reviewed labs today.  BUN 19, creatinine 1.1 on 7/15/2020.        Anoop Yeh MD  7/15/2020

## 2020-07-16 NOTE — PLAN OF CARE
Problem: Rehabilitation (IRF) Plan of Care  Goal: Plan of Care Review  Flowsheets (Taken 7/16/2020 1230)  Plan of Care Reviewed With: patient  IRF Plan of Care Review: progress ongoing, continue  Outcome Summary: Pt transfers with Min A and tolerated supine LE TE with no complaints. Cont POC as tolerated.

## 2020-07-16 NOTE — PLAN OF CARE
Problem: Rehabilitation (IRF) Plan of Care  Goal: Plan of Care Review  Outcome: Progressing  Flowsheets (Taken 7/16/2020 0021)  Plan of Care Reviewed With: patient  IRF Plan of Care Review: progress ongoing, continue  Outcome Summary: Pt alert and oriented x3. Continent of bowel and bladder. No c/o pain or distress. Scheduled meds admistered and tolerated. Safety maintained, call bell in reach. In bed, no problem sleeping. Monitoring ongoing.

## 2020-07-16 NOTE — PROGRESS NOTES
Patient: Jael Herman  Location: [facility] [address]  MRN: [id number]  Today's date: 7/16/2020    History of Present Illness  Jael Herman is a 70 y.o. male whose primary indication for inpatient rehabilitation is Spinal Cord, Non-Traumatic.  Pertinent History of Current Functional Problem:  He has a history of Spinal stenosis of lumbar region with neurogenic claudication [M48.062] and was admitted for a lumbar spinal fusion. Underwent L3-5 PLDF (posterior lumbar decompression, L4-5 posterior lumbar fusion with instrumentation.  by Dr. Gregorio Lancaster 7/6/20 at [facility]. Post op he had anemia but did not require transfusion. His pain was managed with Tylenol, Neurontin, Flexeril and Dilaudid.  He was put on SQ Heparin for DVT ppx. The patient was seen by PM&R and found to have residual deficits in ambulation and ADLs due to S/P lumbar spinal fusion [Z98.1] and was transferred to [facility] on 7/14/2020 for acute inpatient rehab.      Past Medical History  Jael has a past medical history of BPH (benign prostatic hyperplasia), DJD (degenerative joint disease), Hypertension, Lumbar stenosis, and BEN (obstructive sleep apnea).    OT Vitals    Date/Time Pulse BP BP Location BP Method Pt Position [facility]   07/16/20 1408 109 91/52 Left upper arm Automatic Sitting KB   07/16/20 1427 123 101/52 Left upper arm Automatic Sitting KB      OT Pain    Date/Time Pain Type Pref Pain Scale Location Rating: Rest [facility]   07/16/20 1408 Pain Assessment number (Numeric Rating Pain Scale) back 4 KB          Prior Living Environment      Most Recent Value   Lives With  spouse   Living Arrangements  house   Home Accessibility  stairs to enter home (Group)   Number of Stairs, Main Entrance  4   Surface of Stairs, Main Entrance  concrete   Stair Railings, Main Entrance  railing on left side (ascending)   Landing, Stairs, Main Entrance  railings present   Stairs Comment, Main Entrance  side or front  entrance   Location, Kitchen  first (main) floor   Kitchen Access Comment  pt completes IADLs at home   Location, Patient Bedroom  second floor, must negotiate stairs to access   Patient Bedroom Access Comment  has bed on first floor   Location, Bathroom  first (main) floor, second floor, must negotiate stairs to access   Bathroom Access Comment  1st floor powder room, tub shower, wife has shower bench, renovating master bedroom closet into bathroom, standard toilets, no grab bars   Number of Stairs, Within Home, Secondary  other (see comments)   Stairs Comment, Within Home, Secondary  has stair lift for wife          Prior Level of Function      Most Recent Value   Dominant Hand  right   Ambulation  independent   Transferring  independent   Toileting  independent   Bathing  independent   Dressing  independent   Eating  independent   Equipment Currently Used at Home  none          IRF OT Evaluation and Treatment - 07/16/20 1401        Time Calculation    Start Time  1400     Stop Time  1430     Time Calculation (min)  30 min        Session Details    Document Type  daily treatment/progress note     Mode of Treatment  occupational therapy;individual therapy        General Information    General Observations of Patient  pt reports feeling very fatigued this session        Mobility    Weight Shifting Techniques  tilt-back weight shift techniques        Aerobic Exercise    Type (Aerobic Exercise)  arm bike     Time Performed (Aerobic Exercise)  8     Comment (Aerobic Exercise)  4 minutes forward, 4minutes backwards        Daily Progress Summary (OT)    Daily Outcome Statement (OT)  pt reports feeling fatigued this session. BP low. Completed aerobic exercises this session. BP increased at end of session, Required multiple rest breaks to complete d/t fatigue                       Education provided this session. See the Patient Education summary report for full details.    IRF OT Goals      Most Recent Value   Transfer  Goal 1   Activity/Assistive Device  toilet at 07/15/2020 0740   [place]  supervision required at 07/15/2020 0740   Time Frame  short-term goal (STG), 1 week at 07/15/2020 0740   Transfer Goal 2   Activity/Assistive Device  toilet at 07/15/2020 0740   [place]  modified independence at 07/15/2020 0740   Time Frame  long-term goal (LTG), 2 weeks at 07/15/2020 0740   Transfer Goal 3   Activity/Assistive Device  shower at 07/15/2020 0740   [place]  set-up required, supervision required at 07/15/2020 0740   Time Frame  short-term goal (STG), 1 week at 07/15/2020 0740   Transfer Goal 4   Activity/Assistive Device  shower at 07/15/2020 0740   [place]  set-up required, supervision required at 07/15/2020 0740   Time Frame  long-term goal (LTG), 2 weeks at 07/15/2020 0740   Bathing Goal 1   [place]  minimum assist (75% or more patient effort) at 07/15/2020 0740   Time Frame  short-term goal (STG), 1 week at 07/15/2020 0740   Bathing Goal 2   [place]  supervision required, set-up required at 07/15/2020 0740   Time Frame  short-term goal (STG), 1 week at 07/15/2020 0740   UB Dressing Goal 1   [place]  set-up required, supervision required at 07/15/2020 0740   Time Frame  short-term goal (STG), 1 week at 07/15/2020 0740   UB Dressing Goal 2   [place]  modified independence at 07/15/2020 0740   Time Frame  long-term goal (LTG), 2 weeks at 07/15/2020 0740   LB Dressing Goal 1   [place]  moderate assist (50-74% patient effort) at 07/15/2020 0740   Time Frame  short-term goal (STG), 1 week at 07/15/2020 0740   LB Dressing Goal 2   [place]  modified independence at 07/15/2020 0740   Time Frame  long-term goal (LTG), 2 weeks at 07/15/2020 0740   Grooming Goal 1   [place]  supervision required at 07/15/2020 0740   Time Frame  short-term goal (STG), 1 week at 07/15/2020 0740   Strategies/Barriers  in stance at 07/15/2020 0740   Grooming Goal 2   [place]  modified independence at  07/15/2020 0740   Time Frame  long-term goal (LTG), 2 weeks at 07/15/2020 0740   Strategies/Barriers  in stance at 07/15/2020 0740   Toileting Goal 1   [place]  minimum assist (75% or more patient effort) at 07/15/2020 0740   Time Frame  short-term goal (STG), 1 week at 07/15/2020 0740   Toileting Goal 2   [place]  modified independence at 07/15/2020 0740   Time Frame  long-term goal (LTG), 2 weeks at 07/15/2020 0740

## 2020-07-16 NOTE — PROGRESS NOTES
Subjective    Patient seen and examined on rounds.  Chart reviewed.  Events overnight noted.  History reviewed briefly with patient.    CC:  Deficits in mobility, transfers, self-care status post posterior lumbar decompression and fusion, lumbar stenosis, lumbar radiculopathy    HPI:  Mr. Jael Herman  is a 70-year-old right-handed -American gentleman with chronic conditions significant for hypertension, hyperlipidemia, benign process hypertrophy, sleep apnea, lumbar stenosis who had chronic low back pain for over 20 years and pain extending down his lower extremities especially right lower extremity with progressive weakness in his right lower extremity over the past year and he says he was noted to have a right foot drop.  He was diagnosed with lumbar spinal stenosis and underwent L3-5 PLDF by Dr. Gregorio Lancaster on 7/6/20 at [facility].  Postoperatively he is allowed weightbearing as tolerated on both lower extremities.  He was recommended a LSO brace for use when out of bed.  He did have urinary frequency postoperatively.  Drains were removed on 7/12/20.  Postoperative course was significant for abdominal pain and he reports multiple imaging studies were done of his abdomen and he also had barium follow-through.  He was seen by surgical team for his abdominal pain.  He reports abdominal x-rays were negative for obstruction.  Amylase, lipase were not elevated.  He says that when he takes Gabapentin he gets abdominal pain and he noticed that even before his surgery.  We will hold that for now.  Also he will be kept on Protonix/Pepcid.  He is on subcutaneous Heparin for DVT prophylaxis.  He is also on Dilaudid and Flexeril for pain control. He has been kept on bowel medications for his constipation issues.  He still reports ongoing abdominal pain and I mentioned to him that we will get another x-ray of his abdomen tomorrow.  If his abdominal pain gets worse, he may need to go to the  "ER for evaluation. On 7/10/20, hemoglobin was 10.1, WBC count 8.3, BUN 15, creatinine 0.9.  He has been needing assistance for mobility, transfers, self-care postoperatively. He is transferred to [facility] on 7/14/20 for further rehabilitation care.    SUBJ: Discussed results of abdominal x-ray with him.  He had some left upper quadrant pain today while in therapy and had to return back to room.  Discussed with him we can send him to [facility] for evaluation but he wants to hold off.  He wants to wait another day and see how he feels tomorrow and if he is any worse he will go to the ER for evaluation for further work-up.  Discussed with nurse.  Discussed with patient that if he feels worse in the evening or at nighttime house physician can evaluate him and send him to [facility] ER for evaluation if needed.    ROS: Denies chest pain or shortness of breath. Other ROS negative. Past, family, social history is unchanged.      Physical Exam      Blood pressure (!) 102/58, pulse (!) 107, temperature 37.2 °C (98.9 °F), temperature source Oral, resp. rate 18, height 1.803 m (5' 11\"), weight 81.6 kg (180 lb), SpO2 96 %.  Body mass index is 25.1 kg/m².    General Appearance: Not in acute distress  Head/Ear/Nose/Throat: Normocephalic; Atraumatic.   Eye: EOMI; PERRL.   Neck: No JVD; No Bruits.   Respiratory: Decreased breath sounds at bases.   Cardiovascular: RRR; Normal S1, S2.   Gastrointestinal: Soft; NT; +BS.   Extremities: Bilateral lower extremity edema noted.  Incision lumbar spine covered by dressing.  Musculoskeletal: Functional active range of motion in both upper extremities.  He is able to do active straight leg raise in both lower extremities but has weakness in right ankle dorsiflexion and he says he has a known right foot drop.  Neurological: AAO ×3. Speech is fluent. Cranial nerve examination does not reveal any gross facial asymmetry. Strength testing about 4+/5 strength in both upper extremities.  " Bilateral hip flexion is 4/5.  Bilateral quadriceps are 4/5.  Right ankle dorsiflexion is 2+/5.  Right ankle plantar flexion is 4+/5.  Left ankle dorsi and plantar flexion of 4+/5.  He is grossly able to localize touch and position sense in great toes but has some numbness in his legs and feet.  Deep tendon reflexes are hypoactive and symmetric bilaterally.  Plantars are flexor.  Coordination is functional upper extremities.    Behavior/Emotional: Appropriate; Cooperative.   Skin: No obvious rashes noted.  Incision lumbar spine covered with dressing.      Current Facility-Administered Medications:   •  acetaminophen (TYLENOL) tablet 650 mg, 650 mg, oral, q4h PRN, Fredis Lyles MD  •  albuterol HFA (VENTOLIN HFA) 90 mcg/actuation inhaler 2 puff, 2 puff, inhalation, q6h PRN, Fredis Lyles MD  •  alum-mag hydroxide-simeth (MAALOX) 200-200-20 mg/5 mL suspension 30 mL, 30 mL, oral, q6h PRN, Fredis Lyles MD  •  bisacodyL (DULCOLAX) 10 mg suppository 10 mg, 10 mg, rectal, Daily PRN, Fredis Lyles MD  •  calcium carbonate (TUMS) chewable tablet 1,000 mg, 1,000 mg, oral, 3x daily PRN, Fredis Lyles MD  •  cholecalciferol (vitamin D3) tablet 1,000 Units, 1,000 Units, oral, Daily, Fredis Lyles MD, 1,000 Units at 07/16/20 0852  •  cyanocobalamin (VITAMIN B12) tablet 1,000 mcg, 1,000 mcg, oral, Daily, Fredis Lyles MD  •  cyclobenzaprine (FLEXERIL) tablet 10 mg, 10 mg, oral, 3x daily PRN, Fredis Lyles MD  •  famotidine (PEPCID) tablet 20 mg, 20 mg, oral, BID, Fredis Lyles MD, 20 mg at 07/16/20 0851  •  heparin (porcine) 5,000 unit/mL injection 5,000 Units, 5,000 Units, subcutaneous, q8h CARRIE, Fredis Lyles MD, 5,000 Units at 07/16/20 1538  •  HYDROmorphone (DILAUDID) tablet 2-4 mg, 2-4 mg, oral, q4h PRN, Fredis Lyles MD, 4 mg at 07/15/20 1242  •  losartan (COZAAR) tablet 50 mg, 50 mg, oral, Daily, Fredis Lyles MD, 50 mg at 07/16/20 0853  •  ondansetron  ODT (ZOFRAN-ODT) disintegrating tablet 4 mg, 4 mg, oral, q8h PRN, [facility], 4 mg at 07/16/20 0852  •  pantoprazole (PROTONIX) tablet,delayed release (DR/EC) 40 mg, 40 mg, oral, Daily before breakfast, Fredis Lyles MD, 40 mg at 07/16/20 0852  •  polyethylene glycol (MIRALAX) 17 gram packet 17 g, 17 g, oral, Daily, Fredis Lyles MD, 17 g at 07/16/20 0854  •  polyethylene glycol (MIRALAX) 17 gram packet 17 g, 17 g, oral, Daily PRN, Fredis Lyles MD  •  rosuvastatin (CRESTOR) tablet 5 mg, 5 mg, oral, Daily, Fredis Lyles MD, 5 mg at 07/16/20 0853  •  sennosides-docusate sodium (SENOKOT-S) 8.6-50 mg per tablet 1 tablet, 1 tablet, oral, BID, Fredis Lyles MD, 1 tablet at 07/16/20 0852  •  tamsulosin (FLOMAX) 24 hr ER capsule 0.4 mg, 0.4 mg, oral, Nightly, Fredis Lyles MD, 0.4 mg at 07/15/20 2115       Labs / Radiology    Lab Results   Component Value Date    WBC 8.99 07/15/2020    HGB 11.8 (L) 07/15/2020    HCT 35.5 (L) 07/15/2020    MCV 93.9 07/15/2020     (H) 07/15/2020     Lab Results   Component Value Date    GLUCOSE 108 (H) 07/15/2020    CALCIUM 9.3 07/15/2020     07/15/2020    K 4.2 07/15/2020    CO2 24 07/15/2020     07/15/2020    BUN 19 07/15/2020    CREATININE 1.1 07/15/2020       Assessment and Plan    ASSESSMENT PLAN:  1. 70-year-old right-handed -American gentleman with chronic conditions significant for hypertension, hyperlipidemia, benign process hypertrophy, sleep apnea, lumbar stenosis who had chronic low back pain for over 20 years and pain extending down his lower extremities especially right lower extremity with progressive weakness in his right lower extremity over the past year and he says he was noted to have a right foot drop.  He was diagnosed with lumbar spinal stenosis and underwent L3-5 PLDF by Dr. Gregorio Lancaster on 7/6/20 at [facility].  Postoperatively he is allowed weightbearing as tolerated on both  lower extremities.  He was recommended a LSO brace for use when out of bed.  He did have urinary frequency postoperatively.  Drains were removed on 7/12/20.  Postoperative course was significant for abdominal pain and he reports multiple imaging studies were done of his abdomen and he also had barium follow-through.  He was seen by surgical team for his abdominal pain.  He reports abdominal x-rays were negative for obstruction.  Amylase, lipase were not elevated.  He says that when he takes Gabapentin he gets abdominal pain and he noticed that even before his surgery.  We will hold that for now.  Also he will be kept on Protonix/Pepcid.  He is on subcutaneous Heparin for DVT prophylaxis.  He is also on Dilaudid and Flexeril for pain control. He has been kept on bowel medications for his constipation issues.  He still reports ongoing abdominal pain and I mentioned to him that we will get another x-ray of his abdomen tomorrow.  If his abdominal pain gets worse, he may need to go to the ER for evaluation. On 7/10/20, hemoglobin was 10.1, WBC count 8.3, BUN 15, creatinine 0.9.  He has been needing assistance for mobility, transfers, self-care postoperatively. He is transferred to [place] [facility] on 7/14/20 for further rehabilitation care.     2. DVT prophylaxis - on Heparin.  On SCDs.  Platelets 385 on 7/15/2020.     3.  Lumbar stenosis - status post posterior lumbar decompression and fusion, lumbar stenosis, lumbar radiculopathy - continue PT, OT, psychology.  Monitor healing of lumbar incision.  He has chronic right foot drop.     4. GI - On Protonix for GI prophylaxis. On Pepcid.  MiraLAX, Senokot-S.  On PRN Dulcolax.  On PRN Maalox.  On Tums when necessary.  On PRN MiraLAX.  We will get abdominal x-ray tomorrow due to ongoing abdominal pain.Discussed results of abdominal x-ray with him.  He had some left upper quadrant pain today while in therapy and had to return back to room.  Discussed with him we can  send him to [place] ER for evaluation but he wants to hold off.  He wants to wait another day and see how he feels tomorrow and if he is any worse he will go to the ER for evaluation for further work-up.  Discussed with nurse.  Discussed with patient that if he feels worse in the evening or at nighttime house physician can evaluate him and send him to [facility] ER for evaluation if needed.     5.  - voiding.  Denies dysuria.  Monitor post void residuals.  He has some urinary frequency.  On Flomax.     6.  Hypertension -  on Cozaar.     7. Pain - on when necessary Dilaudid.  On Flexeril PRN.  On Tylenol PRN.  Gabapentin was discontinued as patient says it causes abdominal pain for him.  He noticed this even prior to his surgery per patient.     8. Hematology -hemoglobin 11.8, WBC 8.99 on 11/15/2020.     9.  Hyperlipidemia - on Crestor.      10. F/E/N - on vitamin D.     11. Rehabilitation medicine - Continue comprehensive rehabilitation care. Continue PT, OT, speech, psychology.  We will follow falls precautions, cardiac precautions, monitor pulse oximetry in therapy and follow aspiration precautions.  We will follow spinal precautions.He reports he still had some abdominal pain today but is decreasing.  Abdominal x-rays were ordered.  Tolerating diet.Discussed results of abdominal x-ray with him.  He had some left upper quadrant pain today while in therapy and had to return back to room.  Discussed with him we can send him to [facility] for evaluation but he wants to hold off.  He wants to wait another day and see how he feels tomorrow and if he is any worse he will go to the ER for evaluation for further work-up.  Discussed with nurse.  Discussed with patient that if he feels worse in the evening or at nighttime [place] physician can evaluate him and send him to [facility] ER for evaluation if needed.     12. Reviewed labs today.  BUN 19, creatinine 1.1 on 7/15/2020.        Anoop Yeh MD  7/16/2020

## 2020-07-16 NOTE — PROGRESS NOTES
Patient: Jael Herman  Location: [facility]  MRN: [id number]  Today's date: 7/16/2020    History of Present Illness  Jael Herman is a 70 y.o. male whose primary indication for inpatient rehabilitation is Spinal Cord, Non-Traumatic.  Pertinent History of Current Functional Problem:  He has a history of Spinal stenosis of lumbar region with neurogenic claudication [M48.062] and was admitted for a lumbar spinal fusion. Underwent L3-5 PLDF (posterior lumbar decompression, L4-5 posterior lumbar fusion with instrumentation.  by Dr. Gregorio Lancaster 7/6/20 at [facility]. Post op he had anemia but did not require transfusion. His pain was managed with Tylenol, Neurontin, Flexeril and Dilaudid.  He was put on SQ Heparin for DVT ppx. The patient was seen by PM&R and found to have residual deficits in ambulation and ADLs due to S/P lumbar spinal fusion [Z98.1] and was transferred to [facility] on 7/14/2020 for acute inpatient rehab.      Past Medical History  Jael has a past medical history of BPH (benign prostatic hyperplasia), DJD (degenerative joint disease), Hypertension, Lumbar stenosis, and BEN (obstructive sleep apnea).    OT Vitals    Date/Time Pulse BP BP Location BP Method Pt Position [facility]   07/16/20 1102 109 111/66 Left upper arm Automatic Sitting KB      OT Pain    Date/Time Pain Type Pref Pain Scale [facility]   07/16/20 1102 Pain Assessment number (Numeric Rating Pain Scale) KB          Prior Living Environment      Most Recent Value   Lives With  spouse   Living Arrangements  house   Home Accessibility  stairs to enter home (Group)   Number of Stairs, Main Entrance  4   Surface of Stairs, Main Entrance  concrete   Stair Railings, Main Entrance  railing on left side (ascending)   Landing, Stairs, Main Entrance  railings present   Stairs Comment, Main Entrance  side or front entrance   Location, Kitchen  first (main) floor   Kitchen Access Comment  pt completes  IADLs at home   Location, Patient Bedroom  second floor, must negotiate stairs to access   Patient Bedroom Access Comment  has bed on first floor   Location, Bathroom  first (main) floor, second floor, must negotiate stairs to access   Bathroom Access Comment  1st floor powder room, tub shower, wife has shower bench, renovating master bedroom closet into bathroom, standard toilets, no grab bars   Number of Stairs, Within Home, Secondary  other (see comments)   Stairs Comment, Within Home, Secondary  has stair lift for wife          Prior Level of Function      Most Recent Value   Dominant Hand  right   Ambulation  independent   Transferring  independent   Toileting  independent   Bathing  independent   Dressing  independent   Eating  independent   Equipment Currently Used at Home  none          IRF OT Evaluation and Treatment - 07/16/20 1031        Time Calculation    Start Time  1030     Stop Time  1130     Time Calculation (min)  60 min        Session Details    Document Type  daily treatment/progress note     Mode of Treatment  occupational therapy;individual therapy        Sit to Stand Transfer    [place], Sit to Stand Transfer  minimum assist (75% or more patient effort)     Verbal Cues  safety;technique     Assistive Device  walker, front-wheeled        Stand to Sit Transfer    [place], Stand to Sit Transfer  minimum assist (75% or more patient effort);verbal cues     Verbal Cues  safety;technique     Assistive Device  walker, front-wheeled        Stand Pivot Transfer    [place], Stand Pivot/Stand Step Transfer  minimum assist (75% or more patient effort);verbal cues     Verbal Cues  hand placement;safety;technique     Assistive Device  walker, front-wheeled        Toilet Transfer    Transfer Technique  sit-stand;stand-sit     [place], Toilet Transfer  minimum assist (75% or more patient effort);verbal cues     Verbal Cues  safety;technique     Assistive Device  walker, front-wheeled;grab  bars/safety frame     Comment  ambulatory approach to comfot height toilet with grab bar on R        Upper Extremity (Therapeutic Exercise)    Exercise Position/Type (UE Therapeutic Exercise)  seated     General Exercise (Upper Extremity Therapeutic Exercise)  bilateral     Range of Motion Exercises (Upper Extremity Therapeutic Exercise)  shoulder flexion/extension;shoulder abduction/adduction;elbow flexion/extension    chest press    Weight/Resistance (Upper Extremity Therapeutic Exercise)  5 pounds     Reps and Sets (Upper Extremity Therapeutic Exercise)  3 sets of 10     Comment (UE Therapeutic Exercise)  UE exercises with 5lb alternating UEs to maintain spinal precautions        Lower Body Dressing    Self-Performance  don;doff;dons/doffs left sock;dons/doffs right sock     [place], Footwear  supervision;set up     Comment  donned/doffed socks with S with figure 4 positioning. Pt able to simulate donning/doffing pants with use of theraband. Able to thread LEs through theraband with figure 4 positioning        Grooming    Self-Performance  washes, rinses and dries hands     [place]  touching/steadying assist     Position  supported standing        Toileting    [place]  minimum assist (75% or more patient effort);adjust/manage clothing;perform bladder hygiene;perform bowel hygiene     Position  supported sitting     Adaptive Equipment  accessible height toilet;grab bar/safety frame     Comment  assistance for pulling up pants d/t dropping to floor. steadying assist during clothing management and rear hygiene        Daily Progress Summary (OT)    Daily Outcome Statement (OT)  pt completed toileting and LB dressing tasks this session with focus on compensatory strategies to maintain spinal precautions. Pt able to complete figure 4 positioning to increase independence with ADLs. Issued reacher and LH sponge to increase independence with ADLs                       Education provided this session. See  the Patient Education summary report for full details.    IRF OT Goals      Most Recent Value   Transfer Goal 1   Activity/Assistive Device  toilet at 07/15/2020 0740   [place]  supervision required at 07/15/2020 0740   Time Frame  short-term goal (STG), 1 week at 07/15/2020 0740   Transfer Goal 2   Activity/Assistive Device  toilet at 07/15/2020 0740   [place]  modified independence at 07/15/2020 0740   Time Frame  long-term goal (LTG), 2 weeks at 07/15/2020 0740   Transfer Goal 3   Activity/Assistive Device  shower at 07/15/2020 0740   [place]  set-up required, supervision required at 07/15/2020 0740   Time Frame  short-term goal (STG), 1 week at 07/15/2020 0740   Transfer Goal 4   Activity/Assistive Device  shower at 07/15/2020 0740   [place]  set-up required, supervision required at 07/15/2020 0740   Time Frame  long-term goal (LTG), 2 weeks at 07/15/2020 0740   Bathing Goal 1   [place]  minimum assist (75% or more patient effort) at 07/15/2020 0740   Time Frame  short-term goal (STG), 1 week at 07/15/2020 0740   Bathing Goal 2   [place]  supervision required, set-up required at 07/15/2020 0740   Time Frame  short-term goal (STG), 1 week at 07/15/2020 0740   UB Dressing Goal 1   [place]  set-up required, supervision required at 07/15/2020 0740   Time Frame  short-term goal (STG), 1 week at 07/15/2020 0740   UB Dressing Goal 2   [place]  modified independence at 07/15/2020 0740   Time Frame  long-term goal (LTG), 2 weeks at 07/15/2020 0740   LB Dressing Goal 1   [place]  moderate assist (50-74% patient effort) at 07/15/2020 0740   Time Frame  short-term goal (STG), 1 week at 07/15/2020 0740   LB Dressing Goal 2   [place]  modified independence at 07/15/2020 0740   Time Frame  long-term goal (LTG), 2 weeks at 07/15/2020 0740   Grooming Goal 1   [place]  supervision required at 07/15/2020 0740   Time Frame  short-term goal (STG), 1 week at 07/15/2020 0740    Strategies/Barriers  in stance at 07/15/2020 0740   Grooming Goal 2   [place]  modified independence at 07/15/2020 0740   Time Frame  long-term goal (LTG), 2 weeks at 07/15/2020 0740   Strategies/Barriers  in stance at 07/15/2020 0740   Toileting Goal 1   [place]  minimum assist (75% or more patient effort) at 07/15/2020 0740   Time Frame  short-term goal (STG), 1 week at 07/15/2020 0740   Toileting Goal 2   [place]  modified independence at 07/15/2020 0740   Time Frame  long-term goal (LTG), 2 weeks at 07/15/2020 0740

## 2020-07-17 ENCOUNTER — APPOINTMENT (INPATIENT)
Dept: OCCUPATIONAL THERAPY | Facility: REHABILITATION | Age: 70
DRG: 560 | End: 2020-07-17
Payer: MEDICARE

## 2020-07-17 ENCOUNTER — APPOINTMENT (INPATIENT)
Dept: PHYSICAL THERAPY | Facility: REHABILITATION | Age: 70
DRG: 560 | End: 2020-07-17
Payer: MEDICARE

## 2020-07-17 PROCEDURE — 63600000 HC DRUGS/DETAIL CODE: Performed by: HOSPITALIST

## 2020-07-17 PROCEDURE — 12800001 HC ROOM AND CARE SEMIPRIVATE REHAB-BRAIN INJ

## 2020-07-17 PROCEDURE — 97530 THERAPEUTIC ACTIVITIES: CPT | Mod: GO

## 2020-07-17 PROCEDURE — 97530 THERAPEUTIC ACTIVITIES: CPT | Mod: GP

## 2020-07-17 PROCEDURE — 97535 SELF CARE MNGMENT TRAINING: CPT | Mod: GO

## 2020-07-17 PROCEDURE — 97110 THERAPEUTIC EXERCISES: CPT | Mod: GO

## 2020-07-17 PROCEDURE — 97110 THERAPEUTIC EXERCISES: CPT | Mod: GP

## 2020-07-17 PROCEDURE — 63700000 HC SELF-ADMINISTRABLE DRUG: Performed by: HOSPITALIST

## 2020-07-17 PROCEDURE — 97116 GAIT TRAINING THERAPY: CPT | Mod: GP

## 2020-07-17 RX ADMIN — FAMOTIDINE 20 MG: 20 TABLET ORAL at 21:06

## 2020-07-17 RX ADMIN — FAMOTIDINE 20 MG: 20 TABLET ORAL at 08:53

## 2020-07-17 RX ADMIN — TAMSULOSIN HYDROCHLORIDE 0.4 MG: 0.4 CAPSULE ORAL at 21:06

## 2020-07-17 RX ADMIN — HEPARIN SODIUM 5000 UNITS: 5000 INJECTION INTRAVENOUS; SUBCUTANEOUS at 05:33

## 2020-07-17 RX ADMIN — ROSUVASTATIN CALCIUM 5 MG: 5 TABLET, FILM COATED ORAL at 08:53

## 2020-07-17 RX ADMIN — SENNOSIDES AND DOCUSATE SODIUM 1 TABLET: 8.6; 5 TABLET ORAL at 08:54

## 2020-07-17 RX ADMIN — HEPARIN SODIUM 5000 UNITS: 5000 INJECTION INTRAVENOUS; SUBCUTANEOUS at 14:58

## 2020-07-17 RX ADMIN — PANTOPRAZOLE SODIUM 40 MG: 40 TABLET, DELAYED RELEASE ORAL at 08:53

## 2020-07-17 RX ADMIN — LOSARTAN POTASSIUM 50 MG: 50 TABLET, FILM COATED ORAL at 08:54

## 2020-07-17 RX ADMIN — SENNOSIDES AND DOCUSATE SODIUM 1 TABLET: 8.6; 5 TABLET ORAL at 21:06

## 2020-07-17 RX ADMIN — CYANOCOBALAMIN TAB 1000 MCG 1000 MCG: 1000 TAB at 08:53

## 2020-07-17 RX ADMIN — POLYETHYLENE GLYCOL 3350 17 G: 17 POWDER, FOR SOLUTION ORAL at 08:54

## 2020-07-17 RX ADMIN — HEPARIN SODIUM 5000 UNITS: 5000 INJECTION INTRAVENOUS; SUBCUTANEOUS at 21:07

## 2020-07-17 RX ADMIN — MELATONIN 1000 UNITS: at 08:53

## 2020-07-17 NOTE — PROGRESS NOTES
[facility] Progress Note          Patient was seen and examined.   Attestation Notes: Face to face encounter completed    Jael Herman is a 70 y.o. male who was admitted for S/P lumbar spinal fusion [Z98.1]. Patient was referred by Anoop Yeh MD for medical assessment and management      CC: S/P lumbar spinal fusion [Z98.1]     HPI: Jael Herman is a 70 y.o. male with HTN, HL, BPH, BEN (no CPAP), and lumbar stenosis who underwent L3-5 PLDF by Dr. Gregorio Lancaster 7/6/20 at [facility]. Post op he had anemia but did not require transfusion. His pain was managed with Tylenol, Neurontin, Flexeril and Dilaudid.  He was put on SQ Heparin for DVT ppx.      He remained on Losartan for HTN and Crestor for HL. He was also on Flomax for BPH.      He had some post op abdominal discomfort and nausea. Xrays negative for obstruction. Amylase and lipase were not elevated. He was treated with Senokot, Colace, Miralax and Dulcolax suppositories for constipation.      The patient was seen by PM&R and found to have residual deficits in ambulation and ADLs due to S/P lumbar spinal fusion [Z98.1] and came to [facility] on 7/14/2020 for acute inpatient rehab.     He participated in therapy.     SUBJECTIVE:  Patient interviewed and examined     He still complains of LLQ pain, no nausea     Back pain remains stable, no new weakness or numbness, no dysuria, no chest pain, palpitations, dyspnea or fevers    Review of Systems:  All other systems reviewed and negative except as noted in the HPI.    Current meds and allergies reviewed    Past Medical History:   Diagnosis Date   • BPH (benign prostatic hyperplasia)    • DJD (degenerative joint disease)    • Hypertension    • Lumbar stenosis    • BEN (obstructive sleep apnea)      Past Surgical History:   Procedure Laterality Date   • POSTERIOR FUSION LUMBAR SPINE  07/06/2020    L3-5 PLDF     Social History     Tobacco Use   • Smoking  status: Never Smoker   • Smokeless tobacco: Never Used   Substance Use Topics   • Alcohol use: Not Currently   • Drug use: Never      History reviewed. No pertinent family history.    Vital signs in last 24 hours:  Temp:  [37.1 °C (98.7 °F)-37.3 °C (99.1 °F)] 37.1 °C (98.7 °F)  Heart Rate:  [] 110  Resp:  [18] 18  BP: ()/(52-88) 111/70  Vital signs reviewed 07/17/20 1:42 PM    Physical Exam   Constitutional: He is oriented to person, place, and time. He appears well-developed and well-nourished.   HENT:   Head: Normocephalic and atraumatic.   Right Ear: External ear normal.   Left Ear: External ear normal.   Nose: Nose normal.   Mouth/Throat: Oropharynx is clear and moist.   Eyes: Pupils are equal, round, and reactive to light. Conjunctivae and EOM are normal.   Neck: Normal range of motion. Neck supple.   Cardiovascular: Normal rate, regular rhythm, normal heart sounds and intact distal pulses. Exam reveals no gallop and no friction rub.   No murmur heard.  Pulmonary/Chest: Effort normal and breath sounds normal. No stridor. No respiratory distress. He has no wheezes. He has no rales.   Abdominal: Soft. Bowel sounds are normal. He exhibits no distension. There is no tenderness.   Musculoskeletal: He exhibits edema and deformity (s/p lumbar fusion).   Neurological: He is alert and oriented to person, place, and time.   Skin: Skin is warm and dry.   Psychiatric: He has a normal mood and affect. His behavior is normal.   Nursing note and vitals reviewed.              Objective    Labs:  I have reviewed the patient's labs.  Significant abnormals are anemia.  Results from last 7 days   Lab Units 07/15/20  0527   WBC K/uL 8.99   HEMOGLOBIN g/dL 11.8*   HEMATOCRIT % 35.5*   PLATELETS K/uL 385*     Results from last 7 days   Lab Units 07/15/20  0527   SODIUM mEQ/L 137   POTASSIUM mEQ/L 4.2   CHLORIDE mEQ/L 105   CO2 mEQ/L 24   BUN mg/dL 19   CREATININE mg/dL 1.1   CALCIUM mg/dL 9.3   ALBUMIN g/dL 3.5    BILIRUBIN TOTAL mg/dL 0.8   ALK PHOS IU/L 54   ALT IU/L 44   AST IU/L 26   GLUCOSE mg/dL 108*     Results from last 7 days   Lab Units 07/15/20  0527   AMYLASE U/L 112*     Lab Results   Component Value Date    LIPASE 34 07/15/2020     Lab Results   Component Value Date    [id number] 112 (L) 07/15/2020       Imagin/15/20: obstruction series:  IMPRESSION:   1.  No acute cardiopulmonary disease.  2.  Possible mild ileus.      ASSESSMENT/PLAN:    70 y.o. male  with HTN, HL, BPH, BEN (no CPAP), and lumbar stenosis who underwent L3-5 PLDF by Dr. Gregorio Lancaster 20 at [facility]     1. Neuro:  -lumbar stenosis s/p L3-5 PLDF  -Tylenol and Dilaudid prn pain     2. Vasc:  -bilat LE edema  -SCDs and SQ Heparin for DVT ppx  -doppler US bilat LE's neg. 7/15/20     3. Heme:  -anemia due to blood loss, no iron due to GI distress  -B12 low, added oral B12  -follow CBC     4. Renal:  -increased risk of dehydration and electrolyte changes  -follow BMP, Mg     5. Gi:  -abdominal pain since surgery, repeated xrays without obstruction, seen by general surgery  -7/15/20 obstruction series with mild ileus  -7/15/20 amylase slightly elevated and lipase normal, not likely cause of discomfort  -Senokot-S and Miralax for bowels  -Protonix and Pepcid for GERD and ulcer ppx  -may need CT abd/pelvis if pain persists     6. Gu:  -BPH on Flomax  -no UTI or retention     7. Cardiac:  -HTN on Losartan  -watch for orthostatic hypotension  -use cardiac precautions in therapy     8. Pulm:  -BEN but no CPAP  -incentive spirometry for atelectasis     9. Derm:  -seen by [employer] for skin assessment     10. Nutrition:  -seen by Nutrition for assessment and education     11. Endo:  -HL on Crestor     12. Psych:  -seen by Psychology for support     plan discussed with patient, nurse, case management and Anoop Yeh MD Scott Sapperstein, MD  2020  1:42 PM

## 2020-07-17 NOTE — PLAN OF CARE
Problem: Rehabilitation (IRF) Plan of Care  Goal: Plan of Care Review  Outcome: Progressing  Flowsheets (Taken 7/17/2020 0788)  Plan of Care Reviewed With: patient  IRF Plan of Care Review: progress ongoing, continue  Outcome Summary: Pt tolerate session despite fatigue. Able to perform curb/ ramp with Min A. Cont as pt tolerates

## 2020-07-17 NOTE — PLAN OF CARE
Problem: Rehabilitation (IRF) Plan of Care  Goal: Plan of Care Review  Outcome: Progressing  Flowsheets (Taken 7/17/2020 1150)  Plan of Care Reviewed With: patient  IRF Plan of Care Review: progress ongoing, continue  Outcome Summary: patient remains free from falls, using call bell appropriately, no complaints this am

## 2020-07-17 NOTE — PROGRESS NOTES
Patient: Jael Herman  Location: [facility]  MRN: [id number]  Today's date: 7/17/2020    History of Present Illness  Jael Herman is a 70 y.o. male whose primary indication for inpatient rehabilitation is Spinal Cord, Non-Traumatic.  Pertinent History of Current Functional Problem:  He has a history of Spinal stenosis of lumbar region with neurogenic claudication [M48.062] and was admitted for a lumbar spinal fusion. Underwent L3-5 PLDF (posterior lumbar decompression, L4-5 posterior lumbar fusion with instrumentation.  by Dr. Gregorio Lancaster 7/6/20 at [facility]. Post op he had anemia but did not require transfusion. His pain was managed with Tylenol, Neurontin, Flexeril and Dilaudid.  He was put on SQ Heparin for DVT ppx. The patient was seen by PM&R and found to have residual deficits in ambulation and ADLs due to S/P lumbar spinal fusion [Z98.1] and was transferred to [facility] on 7/14/2020 for acute inpatient rehab.      Past Medical History  Jael has a past medical history of BPH (benign prostatic hyperplasia), DJD (degenerative joint disease), Hypertension, Lumbar stenosis, and BEN (obstructive sleep apnea).    OT Vitals    Date/Time Pulse BP BP Location BP Method Pt Position [facility]   07/17/20 1403 95 107/62 Right upper arm Automatic Sitting KB      OT Pain    Date/Time Pain Type Pref Pain Scale Location Rating: Rest Interventions [facility]   07/17/20 1403 Pain Assessment number (Numeric Rating Pain Scale) back 5 premedicated for activity KB          Prior Living Environment      Most Recent Value   Lives With  spouse   Living Arrangements  house   Home Accessibility  stairs to enter home (Group)   Number of Stairs, Main Entrance  4   Surface of Stairs, Main Entrance  concrete   Stair Railings, Main Entrance  railing on left side (ascending)   Landing, Stairs, Main Entrance  railings present   Stairs Comment, Main Entrance  side or front entrance   Location,  Kitchen  first (main) floor   Kitchen Access Comment  pt completes IADLs at home   Location, Patient Bedroom  second floor, must negotiate stairs to access   Patient Bedroom Access Comment  has bed on first floor   Location, Bathroom  first (main) floor, second floor, must negotiate stairs to access   Bathroom Access Comment  1st floor powder room, tub shower, wife has shower bench, renovating master bedroom closet into bathroom, standard toilets, no grab bars   Number of Stairs, Within Home, Secondary  other (see comments)   Stairs Comment, Within Home, Secondary  has stair lift for wife          Prior Level of Function      Most Recent Value   Dominant Hand  right   Ambulation  independent   Transferring  independent   Toileting  independent   Bathing  independent   Dressing  independent   Eating  independent   Equipment Currently Used at Home  none          IRF OT Evaluation and Treatment - 07/17/20 1401        Time Calculation    Start Time  1400     Stop Time  1430     Time Calculation (min)  30 min        Session Details    Document Type  daily treatment/progress note     Mode of Treatment  occupational therapy;individual therapy        General Information    General Observations of Patient  pt reports feeling fatigued at start of session        Sit to Stand Transfer    [place], Sit to Stand Transfer  close supervision;verbal cues     Verbal Cues  safety;hand placement     Assistive Device  walker, front-wheeled     Comment  completed from w/c and couch with L arm rest        Stand to Sit Transfer    [place], Stand to Sit Transfer  close supervision;verbal cues     Verbal Cues  hand placement;safety     Assistive Device  walker, front-wheeled        Stand Pivot Transfer    [place], Stand Pivot/Stand Step Transfer  close supervision;verbal cues     Verbal Cues  hand placement;safety        Toilet Transfer    Transfer Technique  sit-stand;stand-sit     [place], Toilet Transfer  close  supervision;verbal cues     Verbal Cues  hand placement;safety;technique     Assistive Device  walker, front-wheeled;grab bars/safety frame     Comment  recommending toilet safety rails for home        Tub Transfer    Transfer Technique  sit and swing     [place], Tub Transfer  close supervision;verbal cues     Verbal Cues  hand placement;maintaining precautions;preparatory posture;proper use of assistive device;safety;technique     Assistive Device  walker, front-wheeled;tub bench     Comment  recommending sit and swing technique for tub transfer. Pt able to complete with cueing for sequencing        Toileting    [place]  close supervision;adjust/manage clothing;perform bladder hygiene     Position  supported sitting     Setup Assistance  adaptive equipment setup     Adaptive Equipment  grab bar/safety frame        Daily Progress Summary (OT)    Daily Outcome Statement (OT)  pt completed transfer training this session with focus on bathroom DME. Recommending toilet safety rails or raised toielt seat with arms for home. Pt has tub bench but reprots the trial tub bench is more sturdy than his at home. Provided handouts with tub benches, toilet safety rails, and RTS with arms                       Education provided this session. See the Patient Education summary report for full details.    IRF OT Goals      Most Recent Value   Transfer Goal 1   Activity/Assistive Device  toilet at 07/15/2020 0740   [place]  supervision required at 07/15/2020 0740   Time Frame  short-term goal (STG), 1 week at 07/15/2020 0740   Transfer Goal 2   Activity/Assistive Device  toilet at 07/15/2020 0740   [place]  modified independence at 07/15/2020 0740   Time Frame  long-term goal (LTG), 2 weeks at 07/15/2020 0740   Transfer Goal 3   Activity/Assistive Device  shower at 07/15/2020 0740   [place]  set-up required, supervision required at 07/15/2020 0740   Time Frame  short-term goal (STG), 1 week at 07/15/2020 0740    Transfer Goal 4   Activity/Assistive Device  shower at 07/15/2020 0740   [place]  set-up required, supervision required at 07/15/2020 0740   Time Frame  long-term goal (LTG), 2 weeks at 07/15/2020 0740   Bathing Goal 1   [place]  minimum assist (75% or more patient effort) at 07/15/2020 0740   Time Frame  short-term goal (STG), 1 week at 07/15/2020 0740   Bathing Goal 2   [place]  supervision required, set-up required at 07/15/2020 0740   Time Frame  short-term goal (STG), 1 week at 07/15/2020 0740   UB Dressing Goal 1   [place]  set-up required, supervision required at 07/15/2020 0740   Time Frame  short-term goal (STG), 1 week at 07/15/2020 0740   UB Dressing Goal 2   [place]  modified independence at 07/15/2020 0740   Time Frame  long-term goal (LTG), 2 weeks at 07/15/2020 0740   LB Dressing Goal 1   [place]  moderate assist (50-74% patient effort) at 07/15/2020 0740   Time Frame  short-term goal (STG), 1 week at 07/15/2020 0740   LB Dressing Goal 2   [place]  modified independence at 07/15/2020 0740   Time Frame  long-term goal (LTG), 2 weeks at 07/15/2020 0740   Grooming Goal 1   [place]  supervision required at 07/15/2020 0740   Time Frame  short-term goal (STG), 1 week at 07/15/2020 0740   Strategies/Barriers  in stance at 07/15/2020 0740   Grooming Goal 2   [place]  modified independence at 07/15/2020 0740   Time Frame  long-term goal (LTG), 2 weeks at 07/15/2020 0740   Strategies/Barriers  in stance at 07/15/2020 0740   Toileting Goal 1   [place]  minimum assist (75% or more patient effort) at 07/15/2020 0740   Time Frame  short-term goal (STG), 1 week at 07/15/2020 0740   Toileting Goal 2   [place]  modified independence at 07/15/2020 0740   Time Frame  long-term goal (LTG), 2 weeks at 07/15/2020 0740

## 2020-07-17 NOTE — PLAN OF CARE
Problem: Rehabilitation (IRF) Plan of Care  Goal: Plan of Care Review  Flowsheets (Taken 7/17/2020 1141)  Plan of Care Reviewed With: patient  IRF Plan of Care Review: progress ongoing, continue  Outcome Summary: Pt with improved BP this session but still limited by overall fatigue. Ambulating up to 150' with rolling walker and touching assistance for safety.

## 2020-07-17 NOTE — PLAN OF CARE
Problem: Rehabilitation (IRF) Plan of Care  Goal: Plan of Care Review  Outcome: Progressing  Flowsheets (Taken 7/17/2020 0012)  Plan of Care Reviewed With: patient  IRF Plan of Care Review: progress ongoing, continue  Outcome Summary: Pt alert and oriented x3. Continent of bowel and bladder. C/o nausea, Zofran given with positive effect. Pt reported that abdominal discomfort has not getting worse. Had some food this evening with fair appetite. Scheduled meds admistered and tolerated. Safety maintained, call bell in reach. In bed, no problem sleeping. Monitoring ongoing.

## 2020-07-17 NOTE — PLAN OF CARE
Problem: Rehabilitation (IRF) Plan of Care  Goal: Plan of Care Review  Outcome: Progressing  Flowsheets (Taken 7/17/2020 1150)  Plan of Care Reviewed With: patient  IRF Plan of Care Review: progress ongoing, continue  Outcome Summary: pt requires Cl S for transfers, donning/doffing LSO, and donning/doffing shoes

## 2020-07-17 NOTE — PLAN OF CARE
Problem: Rehabilitation (IRF) Plan of Care  Goal: Plan of Care Review  7/17/2020 1503 by Saige Spear OT  Outcome: Progressing  Flowsheets (Taken 7/17/2020 1503)  Plan of Care Reviewed With: patient  IRF Plan of Care Review: progress ongoing, continue  Outcome Summary: pt completed transfer training with RW with Cl S

## 2020-07-17 NOTE — PROGRESS NOTES
Patient: Jael Herman  Location: [facility] [address]  MRN: [id number]  Today's date: 7/17/2020    History of Present Illness  Jael Herman is a 70 y.o. male whose primary indication for inpatient rehabilitation is Spinal Cord, Non-Traumatic.  Pertinent History of Current Functional Problem:  He has a history of Spinal stenosis of lumbar region with neurogenic claudication [M48.062] and was admitted for a lumbar spinal fusion. Underwent L3-5 PLDF (posterior lumbar decompression, L4-5 posterior lumbar fusion with instrumentation.  by Dr. Gregorio Lancaster 7/6/20 at [facility]. Post op he had anemia but did not require transfusion. His pain was managed with Tylenol, Neurontin, Flexeril and Dilaudid.  He was put on SQ Heparin for DVT ppx. The patient was seen by PM&R and found to have residual deficits in ambulation and ADLs due to S/P lumbar spinal fusion [Z98.1] and was transferred to [facility] on 7/14/2020 for acute inpatient rehab.      Past Medical History  Jael has a past medical history of BPH (benign prostatic hyperplasia), DJD (degenerative joint disease), Hypertension, Lumbar stenosis, and BEN (obstructive sleep apnea).    PT Vitals    Date/Time Pulse BP BP Location BP Method Pt Position [facility]   07/17/20 1001 99 138/79 Right upper arm Automatic Sitting JG      PT Pain    Date/Time Pain Type Pref Pain Scale Location Rating: Rest Interventions [facility]   07/17/20 1001 Pain Assessment number (Numeric Rating Pain Scale) back 5 premedicated for activity JG   07/17/20 1055 Post Activity number (Numeric Rating Pain Scale) back 5 position adjusted;other (see comments) adjusted LSO JG          Prior Living Environment      Most Recent Value   Lives With  spouse   Living Arrangements  house   Home Accessibility  stairs to enter home (Group)   Number of Stairs, Main Entrance  4   Surface of Stairs, Main Entrance  concrete   Stair Railings, Main Entrance  railing on left side  (ascending)   Landing, Stairs, Main Entrance  railings present   Stairs Comment, Main Entrance  side or front entrance   Location, Kitchen  first (main) floor   Kitchen Access Comment  pt completes IADLs at home   Location, Patient Bedroom  second floor, must negotiate stairs to access   Patient Bedroom Access Comment  has bed on first floor   Location, Bathroom  first (main) floor, second floor, must negotiate stairs to access   Bathroom Access Comment  1st floor powder room, tub shower, wife has shower bench, renovating master bedroom closet into bathroom, standard toilets, no grab bars   Number of Stairs, Within Home, Secondary  other (see comments)   Stairs Comment, Within Home, Secondary  has stair lift for wife          Prior Level of Function      Most Recent Value   Dominant Hand  right   Ambulation  independent   Transferring  independent   Toileting  independent   Bathing  independent   Dressing  independent   Eating  independent   Equipment Currently Used at Home  none          IRF PT Evaluation and Treatment - 07/17/20 1001        Time Calculation    Start Time  1000     Stop Time  1100     Time Calculation (min)  60 min        Session Details    Document Type  daily treatment/progress note     Mode of Treatment  individual therapy;physical therapy        General Information    General Observations of Patient  Pt with increased fatigue this session        Transfers    Transfers  stand pivot transfer     Maintains Weight-Bearing Status (Transfers)  able to maintain weight-bearing status        Sit to Stand Transfer    [place], Sit to Stand Transfer  close supervision     Verbal Cues  technique;safety     Assistive Device  walker, front-wheeled     Comment  Cl S for safety        Stand to Sit Transfer    [place], Stand to Sit Transfer  close supervision     Verbal Cues  technique;safety     Assistive Device  walker, front-wheeled     Comment  Cl S for safety        Stand Pivot Transfer     "[place], Stand Pivot/Stand Step Transfer  close supervision     Verbal Cues  technique;safety     Assistive Device  walker, front-wheeled     Comment  Cl s for asfety        Gait Training    [place], Gait  touching/steadying assist     Assistive Device  walker, front-wheeled     Distance in Feet  150 feet     Gait Pattern Utilized  step-through     Deviations/Abnormal Patterns (Gait)  right sided deviations;gait speed decreased     Comment  Ambulated 150' x 2 with RW, Cl S/intermittent steadying at hips         Stairs Training    [place], Stairs  minimum assist (75% or more patient effort)     Assistive Device  railing     Handrail Location  both sides     Number of Stairs  12     Stair Height  7 inches     Ascending Stairs Technique  step-over-step     Descending Stairs Technique  step-over-step     Comment  up/down 12 7\" stairs with BHR, min A for steadying; pt completed step over step due to preference, able to complete safely        Balance    Balance Assessment  standing dynamic balance     Comment, Balance  Standing at RW with BUE support: alternating tap ups to 6\" block x 30 reps; Alternating tap ups to cones to work on control/coordination x 30 reps, C lS for safety        Lower Extremity (Therapeutic Exercise)    Exercise Position/Type (LE Therapeutic Exercise)  seated     Reps and Sets (LE Therapeutic Exercise)  2 x 15 reps     Comment (LE Therapeutic Exercise)  Seated: LAQ, resisted knee flexion with green theraband, abduction with green theraband        Daily Progress Summary (PT)    Daily Outcome Statement (PT)  Pt able to progress to Cl S level for transfers with RW. Limited by fatigue and dizziness after prolonged standing. Continue with functional mobiltiy and upright tolerance activities. Progress as able.                             IRF PT Goals      Most Recent Value   Bed Mobility Goal 1   Activity/Assistive Device  bed mobility activities, all at 07/15/2020 1035   [place]  " supervision required, verbal cues required at 07/15/2020 1035   Time Frame  short-term goal (STG), 5 - 7 days at 07/15/2020 1035   Bed Mobility Goal 2   Activity/Assistive Device  bed mobility activities, all at 07/15/2020 1035   [place]  modified independence at 07/15/2020 1035   Time Frame  long-term goal (LTG), 14 days or less at 07/15/2020 1035   Transfer Goal 1   Activity/Assistive Device  sit-to-stand/stand-to-sit, bed-to-chair/chair-to-bed, stand pivot, car transfer at 07/15/2020 1035   [place]  supervision required, verbal cues required at 07/15/2020 1035   Time Frame  short-term goal (STG), 5 - 7 days at 07/15/2020 1035   Transfer Goal 2   Activity/Assistive Device  sit-to-stand/stand-to-sit, bed-to-chair/chair-to-bed, stand pivot, car transfer at 07/15/2020 1035   [place]  modified independence at 07/15/2020 1035   Time Frame  long-term goal (LTG), 14 days or less at 07/15/2020 1035   Gait/Walking Locomotion Goal 1   Activity/Assistive Device  gait (walking locomotion), diminish gait deviation, increase endurance/gait distance, improve balance and speed at 07/15/2020 1035   Distance  150 feet at 07/15/2020 1035   [place]  supervision required, verbal cues required at 07/15/2020 1035   Time Frame  short-term goal (STG), 5 - 7 days at 07/15/2020 1035   Gait/Walking Locomotion Goal 2   Activity/Assistive Device  gait (walking locomotion), diminish gait deviation, increase endurance/gait distance, improve balance and speed at 07/15/2020 1035   Distance  250 feet at 07/15/2020 1035   [place]  modified independence at 07/15/2020 1035   Time Frame  long-term goal (LTG), 14 days or less at 07/15/2020 1035   Stairs Goal 1   Activity/Assistive Device  stairs, all skills, using handrail, left, using handrail, right at 07/15/2020 1035   Number of Stairs  12 at 07/15/2020 1035   [place]  supervision required, verbal cues required at 07/15/2020 1035   Time Frame  short-term goal (STG), 5 -  7 days at 07/15/2020 1035   Stairs Goal 2   Activity/Assistive Device  stairs, all skills [using 1 railing.] at 07/15/2020 1035   Number of Stairs  12 at 07/15/2020 1035   [place]  modified independence at 07/15/2020 1035   Time Frame  long-term goal (LTG), 14 days or less at 07/15/2020 1035   Problem Specific Goal 1   Problem-Specific Goal 1  Pt. will demonstrate an improvement in standing balance as evidenced by a Burrell Balance Test score of > than or = to 32/56. at 07/15/2020 1035   Time Frame  short-term goal (STG), 5 - 7 days at 07/15/2020 1035   Problem Specific Goal 2   Problem-Specific Goal 2  Pt. will demonstrate an improvement in standing balance as evidenced by a Burrell Balance Test score of > than or = to 41/56, indicating a low falls risk. at 07/15/2020 1035   Time Frame  long-term goal (LTG), 14 days or less at 07/15/2020 1035

## 2020-07-17 NOTE — PROGRESS NOTES
Patient: Jael Herman  Location: [facility] [address]  MRN: [id number]  Today's date: 7/17/2020    History of Present Illness  Jael Herman is a 70 y.o. male whose primary indication for inpatient rehabilitation is Spinal Cord, Non-Traumatic.  Pertinent History of Current Functional Problem:  He has a history of Spinal stenosis of lumbar region with neurogenic claudication [M48.062] and was admitted for a lumbar spinal fusion. Underwent L3-5 PLDF (posterior lumbar decompression, L4-5 posterior lumbar fusion with instrumentation.  by Dr. Gregorio Lancaster 7/6/20 at [facility]. Post op he had anemia but did not require transfusion. His pain was managed with Tylenol, Neurontin, Flexeril and Dilaudid.  He was put on SQ Heparin for DVT ppx. The patient was seen by PM&R and found to have residual deficits in ambulation and ADLs due to S/P lumbar spinal fusion [Z98.1] and was transferred to [facility] on 7/14/2020 for acute inpatient rehab.      Past Medical History  Jael has a past medical history of BPH (benign prostatic hyperplasia), DJD (degenerative joint disease), Hypertension, Lumbar stenosis, and BEN (obstructive sleep apnea).    OT Vitals    Date/Time Pulse BP BP Location BP Method Pt Position [facility]   07/17/20 1106 101 121/66 Right upper arm Automatic Sitting KB      OT Pain    Date/Time Pain Type Pref Pain Scale Location Rating: Rest Interventions [facility]   07/17/20 1106 Pain Assessment number (Numeric Rating Pain Scale) back 5 premedicated for activity KB          Prior Living Environment      Most Recent Value   Lives With  spouse   Living Arrangements  house   Home Accessibility  stairs to enter home (Group)   Number of Stairs, Main Entrance  4   Surface of Stairs, Main Entrance  concrete   Stair Railings, Main Entrance  railing on left side (ascending)   Landing, Stairs, Main Entrance  railings present   Stairs Comment, Main Entrance  side or front entrance   Location,  Kitchen  first (main) floor   Kitchen Access Comment  pt completes IADLs at home   Location, Patient Bedroom  second floor, must negotiate stairs to access   Patient Bedroom Access Comment  has bed on first floor   Location, Bathroom  first (main) floor, second floor, must negotiate stairs to access   Bathroom Access Comment  1st floor powder room, tub shower, wife has shower bench, renovating master bedroom closet into bathroom, standard toilets, no grab bars   Number of Stairs, Within Home, Secondary  other (see comments)   Stairs Comment, Within Home, Secondary  has stair lift for wife          Prior Level of Function      Most Recent Value   Dominant Hand  right   Ambulation  independent   Transferring  independent   Toileting  independent   Bathing  independent   Dressing  independent   Eating  independent   Equipment Currently Used at Home  none          IRF OT Evaluation and Treatment - 07/17/20 1101        Time Calculation    Start Time  1100     Stop Time  1200     Time Calculation (min)  60 min        Session Details    Document Type  daily treatment/progress note     Mode of Treatment  occupational therapy;individual therapy        Sit to Stand Transfer    [place], Sit to Stand Transfer  close supervision     Verbal Cues  safety;hand placement     Assistive Device  walker, front-wheeled        Stand to Sit Transfer    [place], Stand to Sit Transfer  close supervision     Verbal Cues  safety;hand placement     Assistive Device  walker, front-wheeled        Stand Pivot Transfer    [place], Stand Pivot/Stand Step Transfer  close supervision;verbal cues     Verbal Cues  hand placement;safety     Assistive Device  walker, front-wheeled        Safety Issues, Functional Mobility    Comment, Safety Issues/Impairments (Mobility)  OT: functional mobility around therapy gym with Cl S        Therapeutic Interventions    Comment, Therapeutic Intervention  SIT TO STANDS: completd multiple sit to stands  from therapy mat at different surface heights. completed 10 reps total with Cl S and cueing for hand placement        Upper Extremity (Therapeutic Exercise)    Exercise Position/Type (UE Therapeutic Exercise)  seated     General Exercise (Upper Extremity Therapeutic Exercise)  bilateral     Range of Motion Exercises (Upper Extremity Therapeutic Exercise)  elbow flexion/extension    overhead press, chest press    Weight/Resistance (Upper Extremity Therapeutic Exercise)  4 pounds     Reps and Sets (Upper Extremity Therapeutic Exercise)  2 sets of 15     Comment (UE Therapeutic Exercise)  alternaing UEs to maintain spinal precautions        Lower Body Dressing    Self-Performance  don;doff;dons/doffs left shoe;dons/doffs right shoe;shoelaces, left;shoelaces, right     [place], Footwear  supervision     Comment  figure 4 positioning and increased time to don/doff shoes        Spinal Orthosis Management    Orthosis Training (Spinal Orthosis)  patient;donning/doffing orthosis;able to show back training     Sensory Assessment (Spinal Orthosis)  pt able to don/doff LSO while seated on EOM with mirror placed anteriorly for visual feedback. Pt requires cueing for technique        Daily Progress Summary (OT)    Daily Outcome Statement (OT)  focus of this session was transfer training, donning/doffing shoes, and donning/doffing LSO brace. Pt able to complete with supervision. Would benefit from continued training. Educated pt on spinal precautions and discussed recommendations for not driving until cleared by surgeon. pt receptive to information provided                       Education provided this session. See the Patient Education summary report for full details.    IRF OT Goals      Most Recent Value   Transfer Goal 1   Activity/Assistive Device  toilet at 07/15/2020 0740   [place]  supervision required at 07/15/2020 0740   Time Frame  short-term goal (STG), 1 week at 07/15/2020 0740   Transfer Goal 2    Activity/Assistive Device  toilet at 07/15/2020 0740   [place]  modified independence at 07/15/2020 0740   Time Frame  long-term goal (LTG), 2 weeks at 07/15/2020 0740   Transfer Goal 3   Activity/Assistive Device  shower at 07/15/2020 0740   [place]  set-up required, supervision required at 07/15/2020 0740   Time Frame  short-term goal (STG), 1 week at 07/15/2020 0740   Transfer Goal 4   Activity/Assistive Device  shower at 07/15/2020 0740   [place]  set-up required, supervision required at 07/15/2020 0740   Time Frame  long-term goal (LTG), 2 weeks at 07/15/2020 0740   Bathing Goal 1   [place]  minimum assist (75% or more patient effort) at 07/15/2020 0740   Time Frame  short-term goal (STG), 1 week at 07/15/2020 0740   Bathing Goal 2   [place]  supervision required, set-up required at 07/15/2020 0740   Time Frame  short-term goal (STG), 1 week at 07/15/2020 0740   UB Dressing Goal 1   [place]  set-up required, supervision required at 07/15/2020 0740   Time Frame  short-term goal (STG), 1 week at 07/15/2020 0740   UB Dressing Goal 2   [place]  modified independence at 07/15/2020 0740   Time Frame  long-term goal (LTG), 2 weeks at 07/15/2020 0740   LB Dressing Goal 1   [place]  moderate assist (50-74% patient effort) at 07/15/2020 0740   Time Frame  short-term goal (STG), 1 week at 07/15/2020 0740   LB Dressing Goal 2   [place]  modified independence at 07/15/2020 0740   Time Frame  long-term goal (LTG), 2 weeks at 07/15/2020 0740   Grooming Goal 1   [place]  supervision required at 07/15/2020 0740   Time Frame  short-term goal (STG), 1 week at 07/15/2020 0740   Strategies/Barriers  in stance at 07/15/2020 0740   Grooming Goal 2   [place]  modified independence at 07/15/2020 0740   Time Frame  long-term goal (LTG), 2 weeks at 07/15/2020 0740   Strategies/Barriers  in stance at 07/15/2020 0740   Toileting Goal 1   [place]  minimum assist (75% or more  patient effort) at 07/15/2020 0740   Time Frame  short-term goal (STG), 1 week at 07/15/2020 0740   Toileting Goal 2   [place]  modified independence at 07/15/2020 0740   Time Frame  long-term goal (LTG), 2 weeks at 07/15/2020 0740

## 2020-07-17 NOTE — PROGRESS NOTES
Patient: Jael Herman  Location: [facility] [address]  MRN: [id number]  Today's date: 7/17/2020    History of Present Illness  Jael Herman is a 70 y.o. male whose primary indication for inpatient rehabilitation is Spinal Cord, Non-Traumatic.  Pertinent History of Current Functional Problem:  He has a history of Spinal stenosis of lumbar region with neurogenic claudication [M48.062] and was admitted for a lumbar spinal fusion. Underwent L3-5 PLDF (posterior lumbar decompression, L4-5 posterior lumbar fusion with instrumentation.  by Dr. Gregorio Lancaster 7/6/20 at [facility]. Post op he had anemia but did not require transfusion. His pain was managed with Tylenol, Neurontin, Flexeril and Dilaudid.  He was put on SQ Heparin for DVT ppx. The patient was seen by PM&R and found to have residual deficits in ambulation and ADLs due to S/P lumbar spinal fusion [Z98.1] and was transferred to [facility] on 7/14/2020 for acute inpatient rehab.      Past Medical History  Jael has a past medical history of BPH (benign prostatic hyperplasia), DJD (degenerative joint disease), Hypertension, Lumbar stenosis, and BEN (obstructive sleep apnea).    PT Vitals    Date/Time Pulse BP BP Location BP Method Pt Position Observations [facility]   07/17/20 1301 100 110/70 Right upper arm Automatic Sitting -- LG   07/17/20 1328 110 111/70 Right calf Automatic Sitting post ambulation LG      PT Pain    Date/Time Pain Type Pref Pain Scale Location Rating: Rest Interventions [facility]   07/17/20 1301 Pain Assessment number (Numeric Rating Pain Scale) back 5 premedicated for activity    07/17/20 1328 Post Activity -- back 5 premedicated for activity           Prior Living Environment      Most Recent Value   Lives With  spouse   Living Arrangements  house   Home Accessibility  stairs to enter home (Group)   Number of Stairs, Main Entrance  4   Surface of Stairs, Main Entrance  concrete   Stair Railings, Main  Entrance  railing on left side (ascending)   Landing, Stairs, Main Entrance  railings present   Stairs Comment, Main Entrance  side or front entrance   Location, Kitchen  first (main) floor   Kitchen Access Comment  pt completes IADLs at home   Location, Patient Bedroom  second floor, must negotiate stairs to access   Patient Bedroom Access Comment  has bed on first floor   Location, Bathroom  first (main) floor, second floor, must negotiate stairs to access   Bathroom Access Comment  1st floor powder room, tub shower, wife has shower bench, renovating master bedroom closet into bathroom, standard toilets, no grab bars   Number of Stairs, Within Home, Secondary  other (see comments)   Stairs Comment, Within Home, Secondary  has stair lift for wife          Prior Level of Function      Most Recent Value   Dominant Hand  right   Ambulation  independent   Transferring  independent   Toileting  independent   Bathing  independent   Dressing  independent   Eating  independent   Equipment Currently Used at Home  none          IRF PT Evaluation and Treatment - 07/17/20 1300        Time Calculation    Start Time  1300     Stop Time  1330     Time Calculation (min)  30 min        Session Details    Document Type  daily treatment/progress note     Mode of Treatment  individual therapy;physical therapy        General Information    General Observations of Patient  Pt received in bed         Bed Mobility    [place], Roll Left  close supervision     [place], Supine to Sit  close supervision     [place], Sit to Supine  close supervision        Stand to Sit Transfer    [place], Stand to Sit Transfer  close supervision     Verbal Cues  safety;hand placement     Assistive Device  walker, front-wheeled        Stand Pivot Transfer    [place], Stand Pivot/Stand Step Transfer  close supervision     Verbal Cues  hand placement;safety     Assistive Device  walker, front-wheeled        Gait Training    [place],  Gait  touching/steadying assist     Assistive Device  walker, front-wheeled     Distance in Feet  150 feet     Gait Pattern Utilized  step-through     Deviations/Abnormal Patterns (Gait)  right sided deviations     Right Sided Gait Deviations  heel strike decreased     Comment  150ft X 3        Curb Negotiation (Mobility)    [place]  minimum assist (75% or more patient effort)     Comment  6 inch curb with RW , assist for balance        Rough/Uneven Surface Gait Skills (Mobility)    [place]  minimum assist (75% or more patient effort)     Comment  5 ft ramp, assist for balance         Balance    Comment, Balance  standing at RW: 6 inch tap ups 2 X 10        Lower Extremity (Therapeutic Exercise)    Comment (LE Therapeutic Exercise)  seated: LAq, ankle pumps 3 X 10         Daily Progress Summary (PT)    Daily Outcome Statement (PT)  Pt tolerate session despite fatigue. Able to perform curb/ ramp with Min A. Cont as pt tolerates                            IRF PT Goals      Most Recent Value   Bed Mobility Goal 1   Activity/Assistive Device  bed mobility activities, all at 07/15/2020 1035   [place]  supervision required, verbal cues required at 07/15/2020 1035   Time Frame  short-term goal (STG), 5 - 7 days at 07/15/2020 1035   Bed Mobility Goal 2   Activity/Assistive Device  bed mobility activities, all at 07/15/2020 1035   [place]  modified independence at 07/15/2020 1035   Time Frame  long-term goal (LTG), 14 days or less at 07/15/2020 1035   Transfer Goal 1   Activity/Assistive Device  sit-to-stand/stand-to-sit, bed-to-chair/chair-to-bed, stand pivot, car transfer at 07/15/2020 1035   [place]  supervision required, verbal cues required at 07/15/2020 1035   Time Frame  short-term goal (STG), 5 - 7 days at 07/15/2020 1035   Transfer Goal 2   Activity/Assistive Device  sit-to-stand/stand-to-sit, bed-to-chair/chair-to-bed, stand pivot, car transfer at 07/15/2020 1035   [place]  modified  independence at 07/15/2020 1035   Time Frame  long-term goal (LTG), 14 days or less at 07/15/2020 1035   Gait/Walking Locomotion Goal 1   Activity/Assistive Device  gait (walking locomotion), diminish gait deviation, increase endurance/gait distance, improve balance and speed at 07/15/2020 1035   Distance  150 feet at 07/15/2020 1035   [place]  supervision required, verbal cues required at 07/15/2020 1035   Time Frame  short-term goal (STG), 5 - 7 days at 07/15/2020 1035   Gait/Walking Locomotion Goal 2   Activity/Assistive Device  gait (walking locomotion), diminish gait deviation, increase endurance/gait distance, improve balance and speed at 07/15/2020 1035   Distance  250 feet at 07/15/2020 1035   [place]  modified independence at 07/15/2020 1035   Time Frame  long-term goal (LTG), 14 days or less at 07/15/2020 1035   Stairs Goal 1   Activity/Assistive Device  stairs, all skills, using handrail, left, using handrail, right at 07/15/2020 1035   Number of Stairs  12 at 07/15/2020 1035   [place]  supervision required, verbal cues required at 07/15/2020 1035   Time Frame  short-term goal (STG), 5 - 7 days at 07/15/2020 1035   Stairs Goal 2   Activity/Assistive Device  stairs, all skills [using 1 railing.] at 07/15/2020 1035   Number of Stairs  12 at 07/15/2020 1035   [place]  modified independence at 07/15/2020 1035   Time Frame  long-term goal (LTG), 14 days or less at 07/15/2020 1035   Problem Specific Goal 1   Problem-Specific Goal 1  Pt. will demonstrate an improvement in standing balance as evidenced by a Burrell Balance Test score of > than or = to 32/56. at 07/15/2020 1035   Time Frame  short-term goal (STG), 5 - 7 days at 07/15/2020 1035   Problem Specific Goal 2   Problem-Specific Goal 2  Pt. will demonstrate an improvement in standing balance as evidenced by a Burrell Balance Test score of > than or = to 41/56, indicating a low falls risk. at 07/15/2020 1035   Time Frame  long-term goal  (LTG), 14 days or less at 07/15/2020 4800

## 2020-07-18 ENCOUNTER — APPOINTMENT (INPATIENT)
Dept: PHYSICAL THERAPY | Facility: REHABILITATION | Age: 70
DRG: 560 | End: 2020-07-18
Payer: MEDICARE

## 2020-07-18 ENCOUNTER — APPOINTMENT (INPATIENT)
Dept: OCCUPATIONAL THERAPY | Facility: REHABILITATION | Age: 70
DRG: 560 | End: 2020-07-18
Payer: MEDICARE

## 2020-07-18 PROCEDURE — 63600000 HC DRUGS/DETAIL CODE: Performed by: HOSPITALIST

## 2020-07-18 PROCEDURE — 97110 THERAPEUTIC EXERCISES: CPT | Mod: GP

## 2020-07-18 PROCEDURE — 97535 SELF CARE MNGMENT TRAINING: CPT | Mod: GO

## 2020-07-18 PROCEDURE — 97530 THERAPEUTIC ACTIVITIES: CPT | Mod: GP

## 2020-07-18 PROCEDURE — 97112 NEUROMUSCULAR REEDUCATION: CPT | Mod: GP

## 2020-07-18 PROCEDURE — 97116 GAIT TRAINING THERAPY: CPT | Mod: GP

## 2020-07-18 PROCEDURE — 63700000 HC SELF-ADMINISTRABLE DRUG: Performed by: HOSPITALIST

## 2020-07-18 PROCEDURE — 12800001 HC ROOM AND CARE SEMIPRIVATE REHAB-BRAIN INJ

## 2020-07-18 RX ADMIN — POLYETHYLENE GLYCOL 3350 17 G: 17 POWDER, FOR SOLUTION ORAL at 14:44

## 2020-07-18 RX ADMIN — PANTOPRAZOLE SODIUM 40 MG: 40 TABLET, DELAYED RELEASE ORAL at 08:13

## 2020-07-18 RX ADMIN — MELATONIN 1000 UNITS: at 08:14

## 2020-07-18 RX ADMIN — CYANOCOBALAMIN TAB 1000 MCG 1000 MCG: 1000 TAB at 08:13

## 2020-07-18 RX ADMIN — TAMSULOSIN HYDROCHLORIDE 0.4 MG: 0.4 CAPSULE ORAL at 20:41

## 2020-07-18 RX ADMIN — ROSUVASTATIN CALCIUM 5 MG: 5 TABLET, FILM COATED ORAL at 08:13

## 2020-07-18 RX ADMIN — FAMOTIDINE 20 MG: 20 TABLET ORAL at 20:41

## 2020-07-18 RX ADMIN — SENNOSIDES AND DOCUSATE SODIUM 1 TABLET: 8.6; 5 TABLET ORAL at 14:44

## 2020-07-18 RX ADMIN — HEPARIN SODIUM 5000 UNITS: 5000 INJECTION INTRAVENOUS; SUBCUTANEOUS at 06:01

## 2020-07-18 RX ADMIN — HEPARIN SODIUM 5000 UNITS: 5000 INJECTION INTRAVENOUS; SUBCUTANEOUS at 14:43

## 2020-07-18 RX ADMIN — SENNOSIDES AND DOCUSATE SODIUM 1 TABLET: 8.6; 5 TABLET ORAL at 20:41

## 2020-07-18 RX ADMIN — LOSARTAN POTASSIUM 50 MG: 50 TABLET, FILM COATED ORAL at 08:14

## 2020-07-18 RX ADMIN — HEPARIN SODIUM 5000 UNITS: 5000 INJECTION INTRAVENOUS; SUBCUTANEOUS at 21:13

## 2020-07-18 RX ADMIN — FAMOTIDINE 20 MG: 20 TABLET ORAL at 08:14

## 2020-07-18 ASSESSMENT — COGNITIVE AND FUNCTIONAL STATUS - GENERAL
PSYCHOMOTOR FUNCTIONING: DECREASED
THOUGHT_CONTENT: APPROPRIATE
SLEEP_WAKE_CYCLE: DECREASED
ATTENTION: WNL
AROUSAL LEVEL: ALERT
PERCEPTUAL FUNCTION: NORMAL
ORIENTATION: FULLY ORIENTED
RECENT MEMORY: WNL
EST. PREMORBID INTELLIGENCE: ABOVE AVERAGE
SPEECH: REGULAR
THOUGHT_PROCESS: WNL
LIBIDO: NON-CONTRIBUTORY
AFFECT: FULL RANGE
APPEARANCE: WELL GROOMED
CONCENTRATION: WNL
IMPULSE CONTROL: INTACT
REMOTE MEMORY: WNL
EYE_CONTACT: WNL
INSIGHT: INTACT
DELUSIONS: NONE OR AGE APPROPRIATE

## 2020-07-18 NOTE — PROGRESS NOTES
Subjective    Patient seen and examined on rounds.  Chart reviewed.  Events overnight noted.  History reviewed briefly with patient.    CC:  Deficits in mobility, transfers, self-care status post posterior lumbar decompression and fusion, lumbar stenosis, lumbar radiculopathy    HPI:  Mr. Jael Herman  is a 70-year-old right-handed -American gentleman with chronic conditions significant for hypertension, hyperlipidemia, benign process hypertrophy, sleep apnea, lumbar stenosis who had chronic low back pain for over 20 years and pain extending down his lower extremities especially right lower extremity with progressive weakness in his right lower extremity over the past year and he says he was noted to have a right foot drop.  He was diagnosed with lumbar spinal stenosis and underwent L3-5 PLDF by Dr. Gregorio Lancaster on 7/6/20 at [facility].  Postoperatively he is allowed weightbearing as tolerated on both lower extremities.  He was recommended a LSO brace for use when out of bed.  He did have urinary frequency postoperatively.  Drains were removed on 7/12/20.  Postoperative course was significant for abdominal pain and he reports multiple imaging studies were done of his abdomen and he also had barium follow-through.  He was seen by surgical team for his abdominal pain.  He reports abdominal x-rays were negative for obstruction.  Amylase, lipase were not elevated.  He says that when he takes Gabapentin he gets abdominal pain and he noticed that even before his surgery.  We will hold that for now.  Also he will be kept on Protonix/Pepcid.  He is on subcutaneous Heparin for DVT prophylaxis.  He is also on Dilaudid and Flexeril for pain control. He has been kept on bowel medications for his constipation issues.  He still reports ongoing abdominal pain and I mentioned to him that we will get another x-ray of his abdomen tomorrow.  If his abdominal pain gets worse, he may need to go to the  "ER for evaluation. On 7/10/20, hemoglobin was 10.1, WBC count 8.3, BUN 15, creatinine 0.9.  He has been needing assistance for mobility, transfers, self-care postoperatively. He is transferred to [facility] on 7/14/20 for further rehabilitation care.    SUBJ: Progressing in therapy.  Decreased abdominal pain per patient.  Able to walk better in therapy.    ROS: Denies chest pain or shortness of breath. Other ROS negative. Past, family, social history is unchanged.      Physical Exam      Blood pressure 107/63, pulse (!) 104, temperature 36.7 °C (98 °F), temperature source Oral, resp. rate 18, height 1.803 m (5' 11\"), weight 81.6 kg (180 lb), SpO2 98 %.  Body mass index is 25.1 kg/m².    General Appearance: Not in acute distress  Head/Ear/Nose/Throat: Normocephalic; Atraumatic.   Eye: EOMI; PERRL.   Neck: No JVD; No Bruits.   Respiratory: Decreased breath sounds at bases.   Cardiovascular: RRR; Normal S1, S2.   Gastrointestinal: Soft; NT; +BS.   Extremities: Bilateral lower extremity edema noted.  Incision lumbar spine covered by dressing.  Musculoskeletal: Functional active range of motion in both upper extremities.  He is able to do active straight leg raise in both lower extremities but has weakness in right ankle dorsiflexion and he says he has a known right foot drop.  Neurological: AAO ×3. Speech is fluent. Cranial nerve examination does not reveal any gross facial asymmetry. Strength testing about 4+/5 strength in both upper extremities.  Bilateral hip flexion is 4/5.  Bilateral quadriceps are 4/5.  Right ankle dorsiflexion is 2+/5.  Right ankle plantar flexion is 4+/5.  Left ankle dorsi and plantar flexion of 4+/5.  He is grossly able to localize touch and position sense in great toes but has some numbness in his legs and feet.  Deep tendon reflexes are hypoactive and symmetric bilaterally.  Plantars are flexor.  Coordination is functional upper extremities.    Neurologically " unchanged.  Behavior/Emotional: Appropriate; Cooperative.   Skin: No obvious rashes noted.  Incision lumbar spine covered with dressing.      Current Facility-Administered Medications:   •  acetaminophen (TYLENOL) tablet 650 mg, 650 mg, oral, q4h PRN, Fredis Lyles MD  •  albuterol HFA (VENTOLIN HFA) 90 mcg/actuation inhaler 2 puff, 2 puff, inhalation, q6h PRN, Fredis Lyles MD  •  alum-mag hydroxide-simeth (MAALOX) 200-200-20 mg/5 mL suspension 30 mL, 30 mL, oral, q6h PRN, Fredis Lyles MD  •  bisacodyL (DULCOLAX) 10 mg suppository 10 mg, 10 mg, rectal, Daily PRN, Fredis Lyles MD  •  calcium carbonate (TUMS) chewable tablet 1,000 mg, 1,000 mg, oral, 3x daily PRN, Fredis Lyles MD  •  cholecalciferol (vitamin D3) tablet 1,000 Units, 1,000 Units, oral, Daily, Fredis Lyles MD, 1,000 Units at 07/18/20 0814  •  cyanocobalamin (VITAMIN B12) tablet 1,000 mcg, 1,000 mcg, oral, Daily, Fredis Lyles MD, 1,000 mcg at 07/18/20 0813  •  cyclobenzaprine (FLEXERIL) tablet 10 mg, 10 mg, oral, 3x daily PRN, Fredis Lyles MD  •  famotidine (PEPCID) tablet 20 mg, 20 mg, oral, BID, Fredis Lyles MD, 20 mg at 07/18/20 0814  •  heparin (porcine) 5,000 unit/mL injection 5,000 Units, 5,000 Units, subcutaneous, q8h CARRIE, Fredis Lyles MD, 5,000 Units at 07/18/20 1443  •  HYDROmorphone (DILAUDID) tablet 2-4 mg, 2-4 mg, oral, q4h PRN, Fredis Lyles MD, 4 mg at 07/15/20 1242  •  losartan (COZAAR) tablet 50 mg, 50 mg, oral, Daily, Fredis Lyles MD, 50 mg at 07/18/20 0814  •  ondansetron ODT (ZOFRAN-ODT) disintegrating tablet 4 mg, 4 mg, oral, q8h PRN, [facility], 4 mg at 07/16/20 1914  •  pantoprazole (PROTONIX) tablet,delayed release (DR/EC) 40 mg, 40 mg, oral, Daily before breakfast, Fredis Lyles MD, 40 mg at 07/18/20 0813  •  polyethylene glycol (MIRALAX) 17 gram packet 17 g, 17 g, oral, Daily, Fredis Lyles MD, 17 g at 07/18/20 1444  •  polyethylene  glycol (MIRALAX) 17 gram packet 17 g, 17 g, oral, Daily PRN, Fredis Lyles MD  •  rosuvastatin (CRESTOR) tablet 5 mg, 5 mg, oral, Daily, Fredis Lyles MD, 5 mg at 07/18/20 0813  •  sennosides-docusate sodium (SENOKOT-S) 8.6-50 mg per tablet 1 tablet, 1 tablet, oral, BID, Fredsi Lyles MD, 1 tablet at 07/18/20 1444  •  tamsulosin (FLOMAX) 24 hr ER capsule 0.4 mg, 0.4 mg, oral, Nightly, Fredis Lyles MD, 0.4 mg at 07/17/20 2106       Labs / Radiology    Lab Results   Component Value Date    WBC 8.99 07/15/2020    HGB 11.8 (L) 07/15/2020    HCT 35.5 (L) 07/15/2020    MCV 93.9 07/15/2020     (H) 07/15/2020     Lab Results   Component Value Date    GLUCOSE 108 (H) 07/15/2020    CALCIUM 9.3 07/15/2020     07/15/2020    K 4.2 07/15/2020    CO2 24 07/15/2020     07/15/2020    BUN 19 07/15/2020    CREATININE 1.1 07/15/2020       Assessment and Plan    ASSESSMENT PLAN:  1. 70-year-old right-handed -American gentleman with chronic conditions significant for hypertension, hyperlipidemia, benign process hypertrophy, sleep apnea, lumbar stenosis who had chronic low back pain for over 20 years and pain extending down his lower extremities especially right lower extremity with progressive weakness in his right lower extremity over the past year and he says he was noted to have a right foot drop.  He was diagnosed with lumbar spinal stenosis and underwent L3-5 PLDF by Dr. Gregorio Lancaster on 7/6/20 at [facility].  Postoperatively he is allowed weightbearing as tolerated on both lower extremities.  He was recommended a LSO brace for use when out of bed.  He did have urinary frequency postoperatively.  Drains were removed on 7/12/20.  Postoperative course was significant for abdominal pain and he reports multiple imaging studies were done of his abdomen and he also had barium follow-through.  He was seen by surgical team for his abdominal pain.  He reports abdominal  x-rays were negative for obstruction.  Amylase, lipase were not elevated.  He says that when he takes Gabapentin he gets abdominal pain and he noticed that even before his surgery.  We will hold that for now.  Also he will be kept on Protonix/Pepcid.  He is on subcutaneous Heparin for DVT prophylaxis.  He is also on Dilaudid and Flexeril for pain control. He has been kept on bowel medications for his constipation issues.  He still reports ongoing abdominal pain and I mentioned to him that we will get another x-ray of his abdomen tomorrow.  If his abdominal pain gets worse, he may need to go to the ER for evaluation. On 7/10/20, hemoglobin was 10.1, WBC count 8.3, BUN 15, creatinine 0.9.  He has been needing assistance for mobility, transfers, self-care postoperatively. He is transferred to [facility] on 7/14/20 for further rehabilitation care.     2. DVT prophylaxis - on Heparin.  On SCDs.  Platelets 385 on 7/15/2020.     3.  Lumbar stenosis - status post posterior lumbar decompression and fusion, lumbar stenosis, lumbar radiculopathy - continue PT, OT, psychology.  Monitor healing of lumbar incision.  He has chronic right foot drop.     4. GI - On Protonix for GI prophylaxis. On Pepcid.  MiraLAX, Senokot-S.  On PRN Dulcolax.  On PRN Maalox.  On Tums when necessary.  On PRN MiraLAX.  We will get abdominal x-ray tomorrow due to ongoing abdominal pain.Discussed results of abdominal x-ray with him.  He had some left upper quadrant pain today while in therapy and had to return back to room.  Discussed with him we can send him to [facility] for evaluation but he wants to hold off.  He wants to wait another day and see how he feels tomorrow and if he is any worse he will go to the ER for evaluation for further work-up.  Discussed with nurse.  Discussed with patient that if he feels worse in the evening or at nighttime house physician can evaluate him and send him to [facility] for evaluation if needed.He reports  abdominal pain is less today.  He does not feel the need to go to the ER.  Progressing in therapy.  He reports decreased abdominal pain today.  He reports he will take MiraLAX tonight as he has some constipation.     5.  - voiding.  Denies dysuria.  Monitor post void residuals.  He has some urinary frequency.  On Flomax.     6.  Hypertension -  on Cozaar.     7. Pain - on when necessary Dilaudid.  On Flexeril PRN.  On Tylenol PRN.  Gabapentin was discontinued as patient says it causes abdominal pain for him.  He noticed this even prior to his surgery per patient.     8. Hematology -hemoglobin 11.8, WBC 8.99 on 11/15/2020.     9.  Hyperlipidemia - on Crestor.      10. F/E/N - on vitamin D.     11. Rehabilitation medicine - Continue comprehensive rehabilitation care. Continue PT, OT, speech, psychology.  We will follow falls precautions, cardiac precautions, monitor pulse oximetry in therapy and follow aspiration precautions.  We will follow spinal precautions.He reports he still had some abdominal pain today but is decreasing.  Abdominal x-rays were ordered.  Tolerating diet.Discussed results of abdominal x-ray with him.  He had some left upper quadrant pain today while in therapy and had to return back to room.  Discussed with him we can send him to [facility] for evaluation but he wants to hold off.  He wants to wait another day and see how he feels tomorrow and if he is any worse he will go to the ER for evaluation for further work-up.  Discussed with nurse.  Discussed with patient that if he feels worse in the evening or at nighttime house physician can evaluate him and send him to [facility] for evaluation if needed.He reports abdominal pain is less today.  He does not feel the need to go to the ER.  Progressing in therapy.  He is ambulating up to 150 feet in therapy close supervision to min assist.  Working with occupational therapy.  He reports he will take MiraLAX tonight as he has some constipation.Progressing  in therapy.  Decreased abdominal pain per patient.  Able to walk better in therapy.     12. Reviewed labs today.  BUN 19, creatinine 1.1 on 7/15/2020.        Anoop Yeh MD  7/18/2020

## 2020-07-18 NOTE — PROGRESS NOTES
Patient: Jael Herman  Location: [facility] [address]  MRN: [id number]  Today's date: 7/18/2020    History of Present Illness  Jael Herman is a 70 y.o. male whose primary indication for inpatient rehabilitation is Spinal Cord, Non-Traumatic.  Pertinent History of Current Functional Problem:  He has a history of Spinal stenosis of lumbar region with neurogenic claudication [M48.062] and was admitted for a lumbar spinal fusion. Underwent L3-5 PLDF (posterior lumbar decompression, L4-5 posterior lumbar fusion with instrumentation.  by Dr. Gregorio Lancaster 7/6/20 at [facility]. Post op he had anemia but did not require transfusion. His pain was managed with Tylenol, Neurontin, Flexeril and Dilaudid.  He was put on SQ Heparin for DVT ppx. The patient was seen by PM&R and found to have residual deficits in ambulation and ADLs due to S/P lumbar spinal fusion [Z98.1] and was transferred to [facility] on 7/14/2020 for acute inpatient rehab.      Past Medical History  Jael has a past medical history of BPH (benign prostatic hyperplasia), DJD (degenerative joint disease), Hypertension, Lumbar stenosis, and BEN (obstructive sleep apnea).    OT Vitals    Date/Time Pulse BP BP Location BP Method Pt Position [facility]   07/18/20 0901 110 123/69 Left upper arm Automatic Sitting AM      OT Pain    Date/Time Pain Type Pref Pain Scale Side Orientation Location Rating: Rest Interventions [facility]   07/18/20 0901 Pain Assessment number (Numeric Rating Pain Scale) Left lower back 5 medication offered but refused AM   07/18/20 1005 Pain Assessment number (Numeric Rating Pain Scale) -- -- -- -- -- [facility]   07/18/20 1028 Post Activity no evident change in pain -- -- -- -- -- -- AM          Prior Living Environment      Most Recent Value   Lives With  spouse   Living Arrangements  house   Home Accessibility  stairs to enter home (Group)   Number of Stairs, Main Entrance  4   Surface of Stairs, Main Entrance   concrete   Stair Railings, Main Entrance  railing on left side (ascending)   Landing, Stairs, Main Entrance  railings present   Stairs Comment, Main Entrance  side or front entrance   Location, Kitchen  first (main) floor   Kitchen Access Comment  pt completes IADLs at home   Location, Patient Bedroom  second floor, must negotiate stairs to access   Patient Bedroom Access Comment  has bed on first floor   Location, Bathroom  first (main) floor, second floor, must negotiate stairs to access   Bathroom Access Comment  1st floor powder room, tub shower, wife has shower bench, renovating master bedroom closet into bathroom, standard toilets, no grab bars   Number of Stairs, Within Home, Secondary  other (see comments)   Stairs Comment, Within Home, Secondary  has stair lift for wife          Prior Level of Function      Most Recent Value   Dominant Hand  right   Ambulation  independent   Transferring  independent   Toileting  independent   Bathing  independent   Dressing  independent   Eating  independent   Equipment Currently Used at Home  none          IRF OT Evaluation and Treatment - 07/18/20 0902        Time Calculation    Start Time  0900     Stop Time  1030     Time Calculation (min)  90 min        Session Details    Document Type  daily treatment/progress note     Mode of Treatment  individual therapy;occupational therapy        General Information    General Observations of Patient  pt. c report of fatigue and pain at the beginning of the session        Sit to Stand Transfer    [place], Sit to Stand Transfer  close supervision     Verbal Cues  hand placement;safety;technique;maintaining precautions     Assistive Device  walker, front-wheeled     Comment  completed x multiple attempts t/o session        Stand to Sit Transfer    [place], Stand to Sit Transfer  close supervision     Verbal Cues  hand placement;safety;technique;maintaining precautions     Assistive Device  walker, front-wheeled      Comment  completed x multiple attempts t/o session        Stand Pivot Transfer    [place], Stand Pivot/Stand Step Transfer  --     Verbal Cues  --     Assistive Device  --     Comment  --        Toilet Transfer    Transfer Technique  stand pivot     [place], Toilet Transfer  close supervision     Verbal Cues  hand placement;safety;technique     Assistive Device  walker, front-wheeled     Comment  amb approach c RW at Cl S tilt in space w/c to/from accessible height toilet seat in pt. bathroom c use of safety frames        Shower Transfer    Transfer Technique  stand pivot     [place], Shower Transfer  close supervision     Assistive Device  grab bars/tub rail;shower chair     Comment  amb approach c RW at Cl S tilt in space w/c to/from tub bench in barrier free shower in pt. bathroom        Safety Issues, Functional Mobility    Comment, Safety Issues/Impairments (Mobility)  OT: Cl S functional amb c RW short HHD in pt. room during ADL routine x multiple attempts        Balance    Comment, Balance  OT: pt. standing in wet shower setting and use of grab bars to wash, rinse and dry buttocks at Cl S level. Pt. standing c RW to complete CM up x 1 attempt at Cl S        Bathing    Self-Performance  chest;left arm;right arm;abdomen;front perineal area;buttocks;left upper leg;right upper leg;left lower leg, including foot;right lower leg, including foot     [place] Assistance  left lower leg, including foot;right lower leg, including foot     [place]  minimum assist (75% or more patient effort)     Position  supported sitting     Setup Assistance  adaptive equipment setup;adjust water temperature/flow;obtain supplies;open containers     Adaptive Equipment  grab bar/tub rail;hand-held shower spray hose;long-handled sponge;shower chair     Comment  waterproof covering donned over incision. pt. completes full wet shower seated on shower chair in barrier free shower in pt. bathroom at min A for drying B LE.  Pt. utilizing LHS to wash B LE during full wet shower. Pt. standing c use of grab bars to wash buttocks at Cl S        Upper Body Dressing    Self-Performance  don;threads left arm, bra/undershirt;threads right arm, bra/undershirt;fastens bra;pulls bra/undershirt over head/around back;pulls bra/undershirt down/adjusts;threads left arm, shirt;threads right arm, shirt;pulls shirt over head/around back;pulls shirt down/adjusts;orthosis application     [place] Assistance  orthosis application     [place]  minimum assist (75% or more patient effort)     Position  supported sitting     Adaptive Equipment  none     Comment  amb level clothing retrieval c RW at Cl S level. pt. completes UB dressing seated in w/c at Cl S level, donning both undershirt and [place] shirt         Lower Body Dressing    Self-Performance  obtains clothes;threads left leg, underpants;threads right leg, underpants;pulls underpants up or down;threads left leg, pants/shorts;threads right leg, pants/shorts;pulls pants/shorts up or down;dons/doffs left sock;dons/doffs right sock     [place] Assistance  other (see comments)     [place]  minimum assist (75% or more patient effort)     Position  supported sitting     Adaptive Equipment  dressing stick;reacher;sock aid     [place], Footwear  supervision     Comment  amb level clothing retrieval c RW and reacher to obtain clothing out of lower drawers at Cl S level. Pt. completes LB dressing seated in w/c. Pt. educated on and trialing varying AE this session. Pt. donning R/L leg into underwear and pants c use of reacher, min A to set up L underwear/pant leg, pt. able to set up reacher c L underwear/pant leg. Pt. threading c increased time, standing c RW at Cl S for CM Up. Pt. trialing dressing stick to doff socks. Pt. trailing sock aid to don socks. Min A to set up R sock onto sock aid, pt. c some difficulty donning sock 2* mid calf sock, min success c donning sock c sock aid, however, pt. c report  of wanting to practice c crew socks. Pt. donning L sock via figure 4 position at Cl S. Educated on figure 4 technique to don B socks, pt. c report of wanting to still attempt sock aid c crew socks. Pt. able to complete LB dressing grossly min A, requiring increased time to complete tasks c AE. Recommend trialing long handled shoe horn next session        Grooming    Self-Performance  washes, rinses and dries face;washes, rinses and dries hands     Comment  pt. washes, rinses and dries face and hands in wet shower setting seated on tub bench in barrier free shower in pt. bathroom. Pt. declining oral care at this time        Toileting    Comment  Pt. continent of bowel at end of session, alerting pt. to utilize call ball when finished for nursing to come A c transfer        Orthotic Management    Orthosis Location  spinal orthosis        Spinal Orthosis Management    Type (Spinal Orthosis)  LSO (lumbar sacral orthosis)     Fabrication Comment (Spinal Orthosis)  LSO donned AAT, doffed for shower, donning c min A post shower        Daily Progress Summary (OT)    Daily Outcome Statement (OT)  Pt. tolerated session well focusing on ADL tasks. pt. c noted improvement in LB dressing c use of AE, recommend con't practice and reinforcement. Recommend trialing long handled shoe horn within upcoming sessions. pt. grossly Cl S for functional transfers and min A for bathing and LB dressing. Pt. Cl S for UB dressing, progressing to amb level clothing retrieval c use of RW and reacher. Con't OT POC to address functional transfers and performance in ADL tasks                       Education provided this session. See the Patient Education summary report for full details.    IRF OT Goals      Most Recent Value   Transfer Goal 1   Activity/Assistive Device  toilet at 07/15/2020 0740   [place]  supervision required at 07/15/2020 0740   Time Frame  short-term goal (STG), 1 week at 07/15/2020 0740   Transfer Goal 2    Activity/Assistive Device  toilet at 07/15/2020 0740   [place]  modified independence at 07/15/2020 0740   Time Frame  long-term goal (LTG), 2 weeks at 07/15/2020 0740   Transfer Goal 3   Activity/Assistive Device  shower at 07/15/2020 0740   [place]  set-up required, supervision required at 07/15/2020 0740   Time Frame  short-term goal (STG), 1 week at 07/15/2020 0740   Transfer Goal 4   Activity/Assistive Device  shower at 07/15/2020 0740   [place]  set-up required, supervision required at 07/15/2020 0740   Time Frame  long-term goal (LTG), 2 weeks at 07/15/2020 0740   Bathing Goal 1   [place]  minimum assist (75% or more patient effort) at 07/15/2020 0740   Time Frame  short-term goal (STG), 1 week at 07/15/2020 0740   Bathing Goal 2   [place]  supervision required, set-up required at 07/15/2020 0740   Time Frame  short-term goal (STG), 1 week at 07/15/2020 0740   UB Dressing Goal 1   [place]  set-up required, supervision required at 07/15/2020 0740   Time Frame  short-term goal (STG), 1 week at 07/15/2020 0740   UB Dressing Goal 2   [place]  modified independence at 07/15/2020 0740   Time Frame  long-term goal (LTG), 2 weeks at 07/15/2020 0740   LB Dressing Goal 1   [place]  moderate assist (50-74% patient effort) at 07/15/2020 0740   Time Frame  short-term goal (STG), 1 week at 07/15/2020 0740   LB Dressing Goal 2   [place]  modified independence at 07/15/2020 0740   Time Frame  long-term goal (LTG), 2 weeks at 07/15/2020 0740   Grooming Goal 1   [place]  supervision required at 07/15/2020 0740   Time Frame  short-term goal (STG), 1 week at 07/15/2020 0740   Strategies/Barriers  in stance at 07/15/2020 0740   Grooming Goal 2   [place]  modified independence at 07/15/2020 0740   Time Frame  long-term goal (LTG), 2 weeks at 07/15/2020 0740   Strategies/Barriers  in stance at 07/15/2020 0740   Toileting Goal 1   [place]  minimum assist (75% or more  patient effort) at 07/15/2020 0740   Time Frame  short-term goal (STG), 1 week at 07/15/2020 0740   Toileting Goal 2   [place]  modified independence at 07/15/2020 0740   Time Frame  long-term goal (LTG), 2 weeks at 07/15/2020 0740

## 2020-07-18 NOTE — PLAN OF CARE
Problem: Rehabilitation (IRF) Plan of Care  Goal: Plan of Care Review  Outcome: Progressing  Flowsheets (Taken 7/17/2020 3187)  Plan of Care Reviewed With: patient  IRF Plan of Care Review: progress ongoing, continue  Outcome Summary: Pt alert and oriented x3. Continent of bowel and bladder.  Pt reported that abdominal discomfort is much improve. Scheduled meds admistered and tolerated. Safety maintained, call bell in reach. In bed, no problem sleeping. Monitoring ongoing.

## 2020-07-18 NOTE — PLAN OF CARE
Individualized Plan of Care    Jael Herman is admitted to [facility] for comprehensive inpatient rehabilitation for Spinal Cord, Non-Traumatic status post posterior lumbar decompression and fusion, lumbar stenosis, lumbar radiculopathy with functional deficits in safety;self-care;mobility. Patient is receiving the following services: occupational therapy;physical therapy, Rehabilitation nursing care, Case management evaluation, recreation therapy, psychology services, medical management    Frequency of Treatment (OT): 60-90 minutes per day, 5-7 times per week  Frequency of Treatment (PT): 60-90 minutes per day, 5-7 times per week    Active medical management is required for   Patient Active Problem List   Diagnosis   • Spinal stenosis   • HTN (hypertension)   • DJD (degenerative joint disease)   • DDD (degenerative disc disease), cervical   • BEN (obstructive sleep apnea)   • S/P lumbar spinal fusion   • Benign prostatic hyperplasia with lower urinary tract symptoms       Risk for Complications  DVT: Moderate  Falls: Moderate      The patient's medical prognosis is good to achieve the stated goals below.    Expected Level of Function  Expected Functional Improvement: safety;self-care;mobility  Self-Care: Independent  Sphincter Control: Independent  Transfers: Independent  Locomotion: Independent  Communication: Independent  Social Cognition: Independent      Goals  IRF PT Goals      Most Recent Value   Bed Mobility Goal 1   Activity/Assistive Device  bed mobility activities, all at 07/15/2020 1035   [place]  supervision required, verbal cues required at 07/15/2020 1035   Time Frame  short-term goal (STG), 5 - 7 days at 07/15/2020 1035   Bed Mobility Goal 2   Activity/Assistive Device  bed mobility activities, all at 07/15/2020 1035   [place]  modified independence at 07/15/2020 1035   Time Frame  long-term goal (LTG), 14 days or less at 07/15/2020 1035   Transfer Goal 1    Activity/Assistive Device  sit-to-stand/stand-to-sit, bed-to-chair/chair-to-bed, stand pivot, car transfer at 07/15/2020 1035   [place]  supervision required, verbal cues required at 07/15/2020 1035   Time Frame  short-term goal (STG), 5 - 7 days at 07/15/2020 1035   Transfer Goal 2   Activity/Assistive Device  sit-to-stand/stand-to-sit, bed-to-chair/chair-to-bed, stand pivot, car transfer at 07/15/2020 1035   [place]  modified independence at 07/15/2020 1035   Time Frame  long-term goal (LTG), 14 days or less at 07/15/2020 1035   Gait/Walking Locomotion Goal 1   Activity/Assistive Device  gait (walking locomotion), diminish gait deviation, increase endurance/gait distance, improve balance and speed at 07/15/2020 1035   Distance  150 feet at 07/15/2020 1035   [place]  supervision required, verbal cues required at 07/15/2020 1035   Time Frame  short-term goal (STG), 5 - 7 days at 07/15/2020 1035   Gait/Walking Locomotion Goal 2   Activity/Assistive Device  gait (walking locomotion), diminish gait deviation, increase endurance/gait distance, improve balance and speed at 07/15/2020 1035   Distance  250 feet at 07/15/2020 1035   [place]  modified independence at 07/15/2020 1035   Time Frame  long-term goal (LTG), 14 days or less at 07/15/2020 1035   Stairs Goal 1   Activity/Assistive Device  stairs, all skills, using handrail, left, using handrail, right at 07/15/2020 1035   Number of Stairs  12 at 07/15/2020 1035   [place]  supervision required, verbal cues required at 07/15/2020 1035   Time Frame  short-term goal (STG), 5 - 7 days at 07/15/2020 1035   Stairs Goal 2   Activity/Assistive Device  stairs, all skills [using 1 railing.] at 07/15/2020 1035   Number of Stairs  12 at 07/15/2020 1035   [place]  modified independence at 07/15/2020 1035   Time Frame  long-term goal (LTG), 14 days or less at 07/15/2020 1035   Problem Specific Goal 1   Problem-Specific Goal 1  Pt. will demonstrate an  improvement in standing balance as evidenced by a Burrell Balance Test score of > than or = to 32/56. at 07/15/2020 1035   Time Frame  short-term goal (STG), 5 - 7 days at 07/15/2020 1035   Problem Specific Goal 2   Problem-Specific Goal 2  Pt. will demonstrate an improvement in standing balance as evidenced by a Burrell Balance Test score of > than or = to 41/56, indicating a low falls risk. at 07/15/2020 1035   Time Frame  long-term goal (LTG), 14 days or less at 07/15/2020 1035        IRF OT Goals      Most Recent Value   Transfer Goal 1   Activity/Assistive Device  toilet at 07/15/2020 0740   [place]  supervision required at 07/15/2020 0740   Time Frame  short-term goal (STG), 1 week at 07/15/2020 0740   Transfer Goal 2   Activity/Assistive Device  toilet at 07/15/2020 0740   [place]  modified independence at 07/15/2020 0740   Time Frame  long-term goal (LTG), 2 weeks at 07/15/2020 0740   Transfer Goal 3   Activity/Assistive Device  shower at 07/15/2020 0740   [place]  set-up required, supervision required at 07/15/2020 0740   Time Frame  short-term goal (STG), 1 week at 07/15/2020 0740   Transfer Goal 4   Activity/Assistive Device  shower at 07/15/2020 0740   [place]  set-up required, supervision required at 07/15/2020 0740   Time Frame  long-term goal (LTG), 2 weeks at 07/15/2020 0740   Bathing Goal 1   [place]  minimum assist (75% or more patient effort) at 07/15/2020 0740   Time Frame  short-term goal (STG), 1 week at 07/15/2020 0740   Bathing Goal 2   [place]  supervision required, set-up required at 07/15/2020 0740   Time Frame  short-term goal (STG), 1 week at 07/15/2020 0740   UB Dressing Goal 1   [place]  set-up required, supervision required at 07/15/2020 0740   Time Frame  short-term goal (STG), 1 week at 07/15/2020 0740   UB Dressing Goal 2   [place]  modified independence at 07/15/2020 0740   Time Frame  long-term goal (LTG), 2 weeks at 07/15/2020 0740   LB Dressing  Goal 1   [place]  moderate assist (50-74% patient effort) at 07/15/2020 0740   Time Frame  short-term goal (STG), 1 week at 07/15/2020 0740   LB Dressing Goal 2   [place]  modified independence at 07/15/2020 0740   Time Frame  long-term goal (LTG), 2 weeks at 07/15/2020 0740   Grooming Goal 1   [place]  supervision required at 07/15/2020 0740   Time Frame  short-term goal (STG), 1 week at 07/15/2020 0740   Strategies/Barriers  in stance at 07/15/2020 0740   Grooming Goal 2   [place]  modified independence at 07/15/2020 0740   Time Frame  long-term goal (LTG), 2 weeks at 07/15/2020 0740   Strategies/Barriers  in stance at 07/15/2020 0740   Toileting Goal 1   [place]  minimum assist (75% or more patient effort) at 07/15/2020 0740   Time Frame  short-term goal (STG), 1 week at 07/15/2020 0740   Toileting Goal 2   [place]  modified independence at 07/15/2020 0740   Time Frame  long-term goal (LTG), 2 weeks at 07/15/2020 0740          Anticipated Discharge Plan  Anticipated Discharge Disposition: home with home health services  Type of Outpatient Services: physical therapy      Expected length of stay: 10 days

## 2020-07-18 NOTE — PLAN OF CARE
Problem: Rehabilitation (IRF) Plan of Care  Goal: Plan of Care Review  Flowsheets (Taken 7/18/2020 1706)  Plan of Care Reviewed With: patient  IRF Plan of Care Review: progress ongoing, continue  Outcome Summary: progressing toward functional goals as expected     Problem: Rehabilitation (IRF) Plan of Care  Goal: Plan of Care Review  Flowsheets (Taken 7/18/2020 1706)  Plan of Care Reviewed With: patient  IRF Plan of Care Review: progress ongoing, continue  Outcome Summary: progressing toward functional goals as expected

## 2020-07-18 NOTE — PLAN OF CARE
Problem: Rehabilitation (IRF) Plan of Care  Goal: Plan of Care Review  Outcome: Progressing  Flowsheets (Taken 7/18/2020 7531)  Plan of Care Reviewed With: patient  IRF Plan of Care Review: progress ongoing, continue  Outcome Summary: Pt. tolerated session well focusing on ADL tasks. pt. c noted improvement in LB dressing c use of AE, recommend con't practice and reinforcement. Recommend trialing long handled shoe horn within upcoming sessions. pt. grossly Cl S for functional transfers and min A for bathing and LB dressing. Pt. Cl S for UB dressing, progressing to amb level clothing retrieval c use of RW and reacher. Con't OT POC to address functional transfers and performance in ADL tasks

## 2020-07-18 NOTE — PROGRESS NOTES
Inpatient Psychology Initial Intake    Duration:  30 minutes    Jael Herman, : 1950, a 70 y.o. male, for initial evaluation visit to discuss Adjustment to Disability.    HPI: chronic conditions significant for hypertension, hyperlipidemia, benign process hypertrophy, sleep apnea, lumbar stenosis who had chronic low back pain for over 20 years and pain extending down his lower extremities especially right lower extremity with progressive weakness in his right lower extremity over the past year and he says he was noted to have a right foot drop.  He was diagnosed with lumbar spinal stenosis and underwent L3-5 PLDF by Dr. Gregorio Lancaster on 20 at [facility].  Postoperatively he is allowed weightbearing as tolerated on both lower extremities.  He was recommended a LSO brace for use when out of bed.  He did have urinary frequency postoperatively.  Drains were removed on 20.  Postoperative course was significant for abdominal pain and he reports multiple imaging studies were done of his abdomen and he also had barium follow-through.  He was seen by surgical team for his abdominal pain.  He reports abdominal x-rays were negative for obstruction.  Amylase, lipase were not elevated.  He says that when he takes Gabapentin he gets abdominal pain and he noticed that even before his surgery.  We will hold that for now.  Also he will be kept on Protonix/Pepcid.  He is on subcutaneous Heparin for DVT prophylaxis.  He is also on Dilaudid and Flexeril for pain control. He has been kept on bowel medications for his constipation issues.  He still reports ongoing abdominal pain and I mentioned to him that we will get another x-ray of his abdomen tomorrow.  If his abdominal pain gets worse, he may need to go to the ER for evaluation. On 7/10/20, hemoglobin was 10.1, WBC count 8.3, BUN 15, creatinine 0.9.  He has been needing assistance for mobility, transfers, self-care postoperatively. I have  reviewed the preadmission screening and concur with that information and there are no significant changes from patient’s preadmission screening. He is transferred to [facility] on 7/14/20 for further rehabilitation care.        Past Medical History:   Diagnosis Date   • BPH (benign prostatic hyperplasia)    • DJD (degenerative joint disease)    • Hypertension    • Lumbar stenosis    • BEN (obstructive sleep apnea)      Past Surgical History:   Procedure Laterality Date   • POSTERIOR FUSION LUMBAR SPINE  07/06/2020    L3-5 PLDF     History reviewed. No pertinent family history.  Social History     Socioeconomic History   • Marital status:      Spouse name: Renata   • Number of children: 1   • Years of education: 18   • Highest education level: Master's degree (e.g., MA, MS, [place], MEd, MSW, HUAN)   Occupational History   • Occupation: Retired School Adminstrtor/counsellor   Social Needs   • Financial resource strain: None   • Food insecurity:     Worry: None     Inability: None   • Transportation needs:     Medical: None     Non-medical: None   Tobacco Use   • Smoking status: Never Smoker   • Smokeless tobacco: Never Used   Substance and Sexual Activity   • Alcohol use: Not Currently   • Drug use: Never   • Sexual activity: None   Lifestyle   • Physical activity:     Days per week: None     Minutes per session: None   • Stress: None   Relationships   • Social connections:     Talks on phone: None     Gets together: None     Attends Taoism service: None     Active member of club or organization: None     Attends meetings of clubs or organizations: None     Relationship status: None   • Intimate partner violence:     Fear of current or ex partner: None     Emotionally abused: None     Physically abused: None     Forced sexual activity: None   Other Topics Concern   • None   Social History Narrative    Wife was former [employer] employee     Was inpt herself at [facility] X 2 ambulates with rw prev Transverse  Myelitis  He reports that he was independent, prior to admission, he drove.  He is a retired  /counselor.  He does report he has had previous as part of his talking therapy counseling training.  He reports he is active and denies difficulty with substances.       Current Legal Status:       Number of Arrests:      Previous Mental Health History:   Previous Mental Health Treatment:  Outpatient  Previous Suicidal Behavior:  N/A  Previous Self-Injurious Behavior:  N/A  Previous Homicidal Behavior:  N/A  Previous Substance Abuse Treatment: N/A    Current Facility-Administered Medications   Medication Dose Route Frequency Provider Last Rate Last Dose   • acetaminophen (TYLENOL) tablet 650 mg  650 mg oral q4h PRN Fredis Lyles MD       • albuterol HFA (VENTOLIN HFA) 90 mcg/actuation inhaler 2 puff  2 puff inhalation q6h PRN Fredis Lyles MD       • alum-mag hydroxide-simeth (MAALOX) 200-200-20 mg/5 mL suspension 30 mL  30 mL oral q6h PRN Fredis Lyles MD       • bisacodyL (DULCOLAX) 10 mg suppository 10 mg  10 mg rectal Daily PRN Fredis Lyles MD       • calcium carbonate (TUMS) chewable tablet 1,000 mg  1,000 mg oral 3x daily PRN Fredis Lyles MD       • cholecalciferol (vitamin D3) tablet 1,000 Units  1,000 Units oral Daily Fredis Lyles MD   1,000 Units at 07/18/20 0814   • cyanocobalamin (VITAMIN B12) tablet 1,000 mcg  1,000 mcg oral Daily Fredis Lyles MD   1,000 mcg at 07/18/20 0813   • cyclobenzaprine (FLEXERIL) tablet 10 mg  10 mg oral 3x daily PRN Fredis Lyles MD       • famotidine (PEPCID) tablet 20 mg  20 mg oral BID Fredis Lyles MD   20 mg at 07/18/20 0814   • heparin (porcine) 5,000 unit/mL injection 5,000 Units  5,000 Units subcutaneous q8h [facility] Fredis Lyles MD   5,000 Units at 07/18/20 1443   • HYDROmorphone (DILAUDID) tablet 2-4 mg  2-4 mg oral q4h PRN Fredis Lyles MD   4 mg at 07/15/20 1242   • losartan (COZAAR)  tablet 50 mg  50 mg oral Daily Fredis Lyles MD   50 mg at 07/18/20 0814   • ondansetron ODT (ZOFRAN-ODT) disintegrating tablet 4 mg  4 mg oral q8h PRN Escobar Palmer MD   4 mg at 07/16/20 1914   • pantoprazole (PROTONIX) tablet,delayed release (DR/EC) 40 mg  40 mg oral Daily before breakfast Fredis Lyles MD   40 mg at 07/18/20 0813   • polyethylene glycol (MIRALAX) 17 gram packet 17 g  17 g oral Daily Fredis Lyles MD   17 g at 07/18/20 1444   • polyethylene glycol (MIRALAX) 17 gram packet 17 g  17 g oral Daily PRN Fredis Lyles MD       • rosuvastatin (CRESTOR) tablet 5 mg  5 mg oral Daily Fredis Lyles MD   5 mg at 07/18/20 0813   • sennosides-docusate sodium (SENOKOT-S) 8.6-50 mg per tablet 1 tablet  1 tablet oral BID Fredis Lyles MD   1 tablet at 07/18/20 1444   • tamsulosin (FLOMAX) 24 hr ER capsule 0.4 mg  0.4 mg oral Nightly Fredis Lyles MD   0.4 mg at 07/17/20 2106       Current Evaluation:   Mental Status Exam:  Arousal Level: Alert  Appearance: Well Groomed  Speech: Regular  Psychomotor Functioning: Decreased  Eye Contact: WNL  Est. Premorbid Intelligence: Above average  Attention: WNL  Concentration: WNL  Recent Memory: WNL  Remote Memory: WNL  Thought Content: Appropriate  Thought Process: WNL  Insight: Intact  Perceptual Function: Normal  Delusions: None or age appropriate  Sleeping: Decreased  Libido: Non-Contributory  Affect: Full Range  Mood: Motivated, Hopeful, Depressed, Frustrated, Anxious    Assessments done this visit:   MMSE=26/27  Gilchrist Suicide Severity Rating Scale:  Not done today    Risk Assessment:   Suicidal Ideation: Not Present  Self Injurious Behavior:  Not Present  Irritability:  Not Present  Homicidal Behavior: Not Present  Estimate of Risk:  None  If risk identified have suicide precautions been implemented? N/A    Interventions  Acceptance & Adjustment  Cognitive Behavior Training  Monitoring of Symptoms    Psychoeducation provided  on:  Spinal Cord Injury and Recovery     Recommendations:   Individual Therapy   25 minutes 1 times weekly        Visit Diagnosis:     Adjustment disorder with Depression and Anxiety    Diagnostic Impression:    Mr. Herman is a 70-year-old man who is status post L3-5 PLDF.  He reports he developed GI symptoms and less in length and his acute hospital stay.  He reports frustration over these symptoms and is concerned now interfering his recovery.  He reports he does feel somewhat better since his transfer to [facility].  He is very fatigued during the interview but displays a full range of affect and provides an accurate history.  He endorses adjustment symptoms including depression and anxiety and frustration.  He felt his initial therapy performances were limited because of his GI distress.  He reports some concern about being apart from his wife who is a history of transverse myelitis and has been inpatient at the rehab.  He reports he feels increasing pressure to get well because of his situation.  He does report his wife will encourage him to stay at [facility] and follow the plan recommended by the staff.  He is hopeful his GI symptoms will resolve and he does report that he feels that listening to by the physicians and this is helping him.  Cognition appears grossly intact on screening.  Recall was 2 out of 3 but cues helped and is felt fatigue interfered with these tasks.  Psychology to follow for support during this inpatient admission  DONAVAN Garcia.D

## 2020-07-18 NOTE — PROGRESS NOTES
Subjective    Patient seen and examined on rounds.  Chart reviewed.  Events overnight noted.  History reviewed briefly with patient.    CC:  Deficits in mobility, transfers, self-care status post posterior lumbar decompression and fusion, lumbar stenosis, lumbar radiculopathy    HPI:  Mr. Jael Herman  is a 70-year-old right-handed -American gentleman with chronic conditions significant for hypertension, hyperlipidemia, benign process hypertrophy, sleep apnea, lumbar stenosis who had chronic low back pain for over 20 years and pain extending down his lower extremities especially right lower extremity with progressive weakness in his right lower extremity over the past year and he says he was noted to have a right foot drop.  He was diagnosed with lumbar spinal stenosis and underwent L3-5 PLDF by Dr. Gregorio Lancaster on 7/6/20 at [facility].  Postoperatively he is allowed weightbearing as tolerated on both lower extremities.  He was recommended a LSO brace for use when out of bed.  He did have urinary frequency postoperatively.  Drains were removed on 7/12/20.  Postoperative course was significant for abdominal pain and he reports multiple imaging studies were done of his abdomen and he also had barium follow-through.  He was seen by surgical team for his abdominal pain.  He reports abdominal x-rays were negative for obstruction.  Amylase, lipase were not elevated.  He says that when he takes Gabapentin he gets abdominal pain and he noticed that even before his surgery.  We will hold that for now.  Also he will be kept on Protonix/Pepcid.  He is on subcutaneous Heparin for DVT prophylaxis.  He is also on Dilaudid and Flexeril for pain control. He has been kept on bowel medications for his constipation issues.  He still reports ongoing abdominal pain and I mentioned to him that we will get another x-ray of his abdomen tomorrow.  If his abdominal pain gets worse, he may need to go to the  "ER for evaluation. On 7/10/20, hemoglobin was 10.1, WBC count 8.3, BUN 15, creatinine 0.9.  He has been needing assistance for mobility, transfers, self-care postoperatively. He is transferred to [facility] on 7/14/20 for further rehabilitation care.    SUBJ: He reports abdominal pain is less today.  He does not feel the need to go to the ER.  Progressing in therapy.  He is ambulating up to 150 feet in therapy close supervision to min assist.  Working with occupational therapy.  He reports he will take MiraLAX tonight as he has some constipation.    ROS: Denies chest pain or shortness of breath. Other ROS negative. Past, family, social history is unchanged.      Physical Exam      Blood pressure 136/63, pulse (!) 102, temperature 36.9 °C (98.5 °F), temperature source Oral, resp. rate 18, height 1.803 m (5' 11\"), weight 81.6 kg (180 lb), SpO2 99 %.  Body mass index is 25.1 kg/m².    General Appearance: Not in acute distress  Head/Ear/Nose/Throat: Normocephalic; Atraumatic.   Eye: EOMI; PERRL.   Neck: No JVD; No Bruits.   Respiratory: Decreased breath sounds at bases.   Cardiovascular: RRR; Normal S1, S2.   Gastrointestinal: Soft; NT; +BS.   Extremities: Bilateral lower extremity edema noted.  Incision lumbar spine covered by dressing.  Musculoskeletal: Functional active range of motion in both upper extremities.  He is able to do active straight leg raise in both lower extremities but has weakness in right ankle dorsiflexion and he says he has a known right foot drop.  Neurological: AAO ×3. Speech is fluent. Cranial nerve examination does not reveal any gross facial asymmetry. Strength testing about 4+/5 strength in both upper extremities.  Bilateral hip flexion is 4/5.  Bilateral quadriceps are 4/5.  Right ankle dorsiflexion is 2+/5.  Right ankle plantar flexion is 4+/5.  Left ankle dorsi and plantar flexion of 4+/5.  He is grossly able to localize touch and position sense in great toes but has some " numbness in his legs and feet.  Deep tendon reflexes are hypoactive and symmetric bilaterally.  Plantars are flexor.  Coordination is functional upper extremities.    Behavior/Emotional: Appropriate; Cooperative.   Skin: No obvious rashes noted.  Incision lumbar spine covered with dressing.      Current Facility-Administered Medications:   •  acetaminophen (TYLENOL) tablet 650 mg, 650 mg, oral, q4h PRN, Fredis Lyles MD  •  albuterol HFA (VENTOLIN HFA) 90 mcg/actuation inhaler 2 puff, 2 puff, inhalation, q6h PRN, Fredis Lyles MD  •  alum-mag hydroxide-simeth (MAALOX) 200-200-20 mg/5 mL suspension 30 mL, 30 mL, oral, q6h PRN, Fredis Lyles MD  •  bisacodyL (DULCOLAX) 10 mg suppository 10 mg, 10 mg, rectal, Daily PRN, Fredis Lyles MD  •  calcium carbonate (TUMS) chewable tablet 1,000 mg, 1,000 mg, oral, 3x daily PRN, Fredis Lyles MD  •  cholecalciferol (vitamin D3) tablet 1,000 Units, 1,000 Units, oral, Daily, Fredis Lyles MD, 1,000 Units at 07/17/20 0853  •  cyanocobalamin (VITAMIN B12) tablet 1,000 mcg, 1,000 mcg, oral, Daily, Fredis Lyles MD, 1,000 mcg at 07/17/20 0853  •  cyclobenzaprine (FLEXERIL) tablet 10 mg, 10 mg, oral, 3x daily PRN, Fredis Lyles MD  •  famotidine (PEPCID) tablet 20 mg, 20 mg, oral, BID, Fredis Lyles MD, 20 mg at 07/17/20 2106  •  heparin (porcine) 5,000 unit/mL injection 5,000 Units, 5,000 Units, subcutaneous, q8h CARRIE, Fredis Lyles MD, 5,000 Units at 07/17/20 2107  •  HYDROmorphone (DILAUDID) tablet 2-4 mg, 2-4 mg, oral, q4h PRN, Fredis Lyles MD, 4 mg at 07/15/20 1242  •  losartan (COZAAR) tablet 50 mg, 50 mg, oral, Daily, Fredis Lyles MD, 50 mg at 07/17/20 0854  •  ondansetron ODT (ZOFRAN-ODT) disintegrating tablet 4 mg, 4 mg, oral, q8h PRN, Chec, MD Escobar, 4 mg at 07/16/20 1914  •  pantoprazole (PROTONIX) tablet,delayed release (DR/EC) 40 mg, 40 mg, oral, Daily before breakfast, Fredis Lyles MD, 40  mg at 07/17/20 0853  •  polyethylene glycol (MIRALAX) 17 gram packet 17 g, 17 g, oral, Daily, Fredis Lyles MD, 17 g at 07/17/20 0854  •  polyethylene glycol (MIRALAX) 17 gram packet 17 g, 17 g, oral, Daily PRN, Fredis Lyles MD  •  rosuvastatin (CRESTOR) tablet 5 mg, 5 mg, oral, Daily, Fredis Lyles MD, 5 mg at 07/17/20 0853  •  sennosides-docusate sodium (SENOKOT-S) 8.6-50 mg per tablet 1 tablet, 1 tablet, oral, BID, Fredis Lyles MD, 1 tablet at 07/17/20 2106  •  tamsulosin (FLOMAX) 24 hr ER capsule 0.4 mg, 0.4 mg, oral, Nightly, Fredis Lyles MD, 0.4 mg at 07/17/20 2106       Labs / Radiology    Lab Results   Component Value Date    WBC 8.99 07/15/2020    HGB 11.8 (L) 07/15/2020    HCT 35.5 (L) 07/15/2020    MCV 93.9 07/15/2020     (H) 07/15/2020     Lab Results   Component Value Date    GLUCOSE 108 (H) 07/15/2020    CALCIUM 9.3 07/15/2020     07/15/2020    K 4.2 07/15/2020    CO2 24 07/15/2020     07/15/2020    BUN 19 07/15/2020    CREATININE 1.1 07/15/2020       Assessment and Plan    ASSESSMENT PLAN:  1. 70-year-old right-handed -American gentleman with chronic conditions significant for hypertension, hyperlipidemia, benign process hypertrophy, sleep apnea, lumbar stenosis who had chronic low back pain for over 20 years and pain extending down his lower extremities especially right lower extremity with progressive weakness in his right lower extremity over the past year and he says he was noted to have a right foot drop.  He was diagnosed with lumbar spinal stenosis and underwent L3-5 PLDF by Dr. Gregorio Lancaster on 7/6/[facility] at [facility].  Postoperatively he is allowed weightbearing as tolerated on both lower extremities.  He was recommended a LSO brace for use when out of bed.  He did have urinary frequency postoperatively.  Drains were removed on 7/12/20.  Postoperative course was significant for abdominal pain and he reports multiple  imaging studies were done of his abdomen and he also had barium follow-through.  He was seen by surgical team for his abdominal pain.  He reports abdominal x-rays were negative for obstruction.  Amylase, lipase were not elevated.  He says that when he takes Gabapentin he gets abdominal pain and he noticed that even before his surgery.  We will hold that for now.  Also he will be kept on Protonix/Pepcid.  He is on subcutaneous Heparin for DVT prophylaxis.  He is also on Dilaudid and Flexeril for pain control. He has been kept on bowel medications for his constipation issues.  He still reports ongoing abdominal pain and I mentioned to him that we will get another x-ray of his abdomen tomorrow.  If his abdominal pain gets worse, he may need to go to the ER for evaluation. On 7/10/20, hemoglobin was 10.1, WBC count 8.3, BUN 15, creatinine 0.9.  He has been needing assistance for mobility, transfers, self-care postoperatively. He is transferred to [facility] on 7/14/20 for further rehabilitation care.     2. DVT prophylaxis - on Heparin.  On SCDs.  Platelets 385 on 7/15/2020.     3.  Lumbar stenosis - status post posterior lumbar decompression and fusion, lumbar stenosis, lumbar radiculopathy - continue PT, OT, psychology.  Monitor healing of lumbar incision.  He has chronic right foot drop.     4. GI - On Protonix for GI prophylaxis. On Pepcid.  MiraLAX, Senokot-S.  On PRN Dulcolax.  On PRN Maalox.  On Tums when necessary.  On PRN MiraLAX.  We will get abdominal x-ray tomorrow due to ongoing abdominal pain.Discussed results of abdominal x-ray with him.  He had some left upper quadrant pain today while in therapy and had to return back to room.  Discussed with him we can send him to [place] [facility] for evaluation but he wants to hold off.  He wants to wait another day and see how he feels tomorrow and if he is any worse he will go to the ER for evaluation for further work-up.  Discussed with nurse.  Discussed with  patient that if he feels worse in the evening or at nighttime house physician can evaluate him and send him to [facility] for evaluation if needed.He reports abdominal pain is less today.  He does not feel the need to go to the ER.  Progressing in therapy.  He reports decreased abdominal pain today.  He reports he will take MiraLAX tonight as he has some constipation.     5.  - voiding.  Denies dysuria.  Monitor post void residuals.  He has some urinary frequency.  On Flomax.     6.  Hypertension -  on Cozaar.     7. Pain - on when necessary Dilaudid.  On Flexeril PRN.  On Tylenol PRN.  Gabapentin was discontinued as patient says it causes abdominal pain for him.  He noticed this even prior to his surgery per patient.     8. Hematology -hemoglobin 11.8, WBC 8.99 on 11/15/2020.     9.  Hyperlipidemia - on Crestor.      10. F/E/N - on vitamin D.     11. Rehabilitation medicine - Continue comprehensive rehabilitation care. Continue PT, OT, speech, psychology.  We will follow falls precautions, cardiac precautions, monitor pulse oximetry in therapy and follow aspiration precautions.  We will follow spinal precautions.He reports he still had some abdominal pain today but is decreasing.  Abdominal x-rays were ordered.  Tolerating diet.Discussed results of abdominal x-ray with him.  He had some left upper quadrant pain today while in therapy and had to return back to room.  Discussed with him we can send him to [facility] for evaluation but he wants to hold off.  He wants to wait another day and see how he feels tomorrow and if he is any worse he will go to the ER for evaluation for further work-up.  Discussed with nurse.  Discussed with patient that if he feels worse in the evening or at nighttime house physician can evaluate him and send him to [facility] for evaluation if needed.He reports abdominal pain is less today.  He does not feel the need to go to the ER.  Progressing in therapy.  He is ambulating up to 150 feet in  therapy close supervision to min assist.  Working with occupational therapy.  He reports he will take MiraLAX tonight as he has some constipation.     12. Reviewed labs today.  BUN 19, creatinine 1.1 on 7/15/2020.        Anoop Yeh MD  7/17/2020

## 2020-07-18 NOTE — PROGRESS NOTES
Patient: Jael Herman  Location: [facility]  MRN: [id number]  Today's date: 7/18/2020    History of Present Illness  Jael Herman is a 70 y.o. male whose primary indication for inpatient rehabilitation is Spinal Cord, Non-Traumatic.  Pertinent History of Current Functional Problem:  He has a history of Spinal stenosis of lumbar region with neurogenic claudication [M48.062] and was admitted for a lumbar spinal fusion. Underwent L3-5 PLDF (posterior lumbar decompression, L4-5 posterior lumbar fusion with instrumentation.  by Dr. Gregorio Lancaster 7/6/20 at [facility]. Post op he had anemia but did not require transfusion. His pain was managed with Tylenol, Neurontin, Flexeril and Dilaudid.  He was put on SQ Heparin for DVT ppx. The patient was seen by PM&R and found to have residual deficits in ambulation and ADLs due to S/P lumbar spinal fusion [Z98.1] and was transferred to [facility] on 7/14/2020 for acute inpatient rehab.      Past Medical History  Jael has a past medical history of BPH (benign prostatic hyperplasia), DJD (degenerative joint disease), Hypertension, Lumbar stenosis, and BEN (obstructive sleep apnea).    PT Vitals    Date/Time Pulse SpO2 Pt Activity O2 Therapy BP BP Location BP Method Pt Position [facility]   07/18/20 1300 109 100 % At rest None (Room air) 132/82 Left upper arm Automatic Sitting MKW      PT Pain    Date/Time Pain Type Pref Pain Scale Location Rating: Rest Rating: Activity Interventions [facility]   07/18/20 1300 Pain Assessment number (Numeric Rating Pain Scale) abdomen 5 5 other (see comments) nursing informed W   07/18/20 1420 Pain Reassessment number (Numeric Rating Pain Scale) ankle 5 -- -- MKW          Prior Living Environment      Most Recent Value   Lives With  spouse   Living Arrangements  house   Home Accessibility  stairs to enter home (Group)   Number of Stairs, Main Entrance  4   Surface of Stairs, Main Entrance  concrete    Stair Railings, Main Entrance  railing on left side (ascending)   Landing, Stairs, Main Entrance  railings present   Stairs Comment, Main Entrance  side or front entrance   Location, Kitchen  first (main) floor   Kitchen Access Comment  pt completes IADLs at home   Location, Patient Bedroom  second floor, must negotiate stairs to access   Patient Bedroom Access Comment  has bed on first floor   Location, Bathroom  first (main) floor, second floor, must negotiate stairs to access   Bathroom Access Comment  1st floor powder room, tub shower, wife has shower bench, renovating master bedroom closet into bathroom, standard toilets, no grab bars   Number of Stairs, Within Home, Secondary  other (see comments)   Stairs Comment, Within Home, Secondary  has stair lift for wife          Prior Level of Function      Most Recent Value   Dominant Hand  right   Ambulation  independent   Transferring  independent   Toileting  independent   Bathing  independent   Dressing  independent   Eating  independent   Equipment Currently Used at Home  none          IRF PT Evaluation and Treatment - 07/18/20 1300        Time Calculation    Start Time  1300     Stop Time  1430     Time Calculation (min)  90 min        Session Details    Document Type  daily treatment/progress note     Mode of Treatment  individual therapy;physical therapy        General Information    Patient Profile Reviewed?  yes        Bed Mobility    [place], Supine to Sit  close supervision     Verbal Cues (Supine to Sit)  technique;safety     [place], Sit to Supine  close supervision     Verbal Cues (Sit to Supine)  safety;technique     Assistive Device (Bed Mobility)  bed rails;head of bed elevated     Comment (Bed Mobility)  hospital bed        Sit to Stand Transfer    [place], Sit to Stand Transfer  close supervision;verbal cues     Verbal Cues  hand placement;proper use of assistive device;safety;technique     Assistive Device  walker, front-wheeled   "   Comment  from ,  multiple trials        Stand to Sit Transfer    [place], Stand to Sit Transfer  close supervision;verbal cues     Verbal Cues  hand placement;safety;technique     Assistive Device  walker, front-wheeled     Comment  to , multiple trials        Stand Pivot Transfer    [place], Stand Pivot/Stand Step Transfer  close supervision;verbal cues     Verbal Cues  hand placement;proper use of assistive device;safety;technique     Assistive Device  walker, front-wheeled     Comment  ambulatory approach to , simulated car        Car Transfer    Transfer Technique  stand pivot     [place], Car Transfer  minimum assist (75% or more patient effort)     Verbal Cues  hand placement;safety;maintaining center of gravity over base of support;technique     Assistive Device  walker, front-wheeled        Gait Training    [place], Gait  touching/steadying assist     Assistive Device  walker, front-wheeled     Distance in Feet  150 feet    2 trials    Gait Pattern Utilized  step-through     Right Sided Gait Deviations  foot drop/toe drag;heel strike decreased        Curb Negotiation (Mobility)    [place]  minimum assist (75% or more patient effort);verbal cues     Comment  up/down two,4\" curbs with RW, assist for balance and walker management, v. cues for sequencing        Rough/Uneven Surface Gait Skills (Mobility)    Comment  --        Sloped Surface Gait Skills (Mobility)    [place]  minimum assist (75% or more patient effort);verbal cues     Comment  up/down 30' ADA ramp with RW, assist for balance, v. cues for walker management        Stairs Training    [place], Stairs  minimum assist (75% or more patient effort);verbal cues     Assistive Device  railing     Handrail Location  both sides     Number of Stairs  12     Stair Height  7 inches     Ascending Stairs Technique  step-over-step     Descending Stairs Technique  step-to-step     Comment  Min A up/down 12, 7\" carpeted " "steps with 2 HR, v. cues for full foot on step; self selected step over step up and step to down        Balance    Comment, Balance  in ll bars: alt 6\" box taps x 20; alt cone taps x 20         Lower Extremity (Therapeutic Exercise)    Exercise Position/Type (LE Therapeutic Exercise)  seated     General Exercise (LE Therapeutic Exercise)  bilateral;LAQ (long arc quad)     Range of Motion Exercises (LE Therapeutic Exercise)  bilateral    AP's    Reps and Sets (LE Therapeutic Exercise)  20 - 30     Comment (LE Therapeutic Exercise)  AP's x 20; LAQ 10 x 2; green T-band: knee flex 10 x 2; hip abd 10 x 2        Daily Progress Summary (PT)    Symptoms Noted During/After Treatment  fatigue     Progress Toward Functional Goals (PT)  progressing toward functional goals as expected     Daily Outcome Statement (PT)  Discomfort at abdomen due to constipation limited ease of activity this session, however, patient cooperative with treatment activities     Recommendations  Monitor response to treatment and progress as able.                            IRF PT Goals      Most Recent Value   Bed Mobility Goal 1   Activity/Assistive Device  bed mobility activities, all at 07/15/2020 1035   [place]  supervision required, verbal cues required at 07/15/2020 1035   Time Frame  short-term goal (STG), 5 - 7 days at 07/15/2020 1035   Bed Mobility Goal 2   Activity/Assistive Device  bed mobility activities, all at 07/15/2020 1035   [place]  modified independence at 07/15/2020 1035   Time Frame  long-term goal (LTG), 14 days or less at 07/15/2020 1035   Transfer Goal 1   Activity/Assistive Device  sit-to-stand/stand-to-sit, bed-to-chair/chair-to-bed, stand pivot, car transfer at 07/15/2020 1035   [place]  supervision required, verbal cues required at 07/15/2020 1035   Time Frame  short-term goal (STG), 5 - 7 days at 07/15/2020 1035   Transfer Goal 2   Activity/Assistive Device  sit-to-stand/stand-to-sit, " bed-to-chair/chair-to-bed, stand pivot, car transfer at 07/15/2020 1035   [place]  modified independence at 07/15/2020 1035   Time Frame  long-term goal (LTG), 14 days or less at 07/15/2020 1035   Gait/Walking Locomotion Goal 1   Activity/Assistive Device  gait (walking locomotion), diminish gait deviation, increase endurance/gait distance, improve balance and speed at 07/15/2020 1035   Distance  150 feet at 07/15/2020 1035   [place]  supervision required, verbal cues required at 07/15/2020 1035   Time Frame  short-term goal (STG), 5 - 7 days at 07/15/2020 1035   Gait/Walking Locomotion Goal 2   Activity/Assistive Device  gait (walking locomotion), diminish gait deviation, increase endurance/gait distance, improve balance and speed at 07/15/2020 1035   Distance  250 feet at 07/15/2020 1035   [place]  modified independence at 07/15/2020 1035   Time Frame  long-term goal (LTG), 14 days or less at 07/15/2020 1035   Stairs Goal 1   Activity/Assistive Device  stairs, all skills, using handrail, left, using handrail, right at 07/15/2020 1035   Number of Stairs  12 at 07/15/2020 1035   [place]  supervision required, verbal cues required at 07/15/2020 1035   Time Frame  short-term goal (STG), 5 - 7 days at 07/15/2020 1035   Stairs Goal 2   Activity/Assistive Device  stairs, all skills [using 1 railing.] at 07/15/2020 1035   Number of Stairs  12 at 07/15/2020 1035   [place]  modified independence at 07/15/2020 1035   Time Frame  long-term goal (LTG), 14 days or less at 07/15/2020 1035   Problem Specific Goal 1   Problem-Specific Goal 1  Pt. will demonstrate an improvement in standing balance as evidenced by a Burrell Balance Test score of > than or = to 32/56. at 07/15/2020 1035   Time Frame  short-term goal (STG), 5 - 7 days at 07/15/2020 1035   Problem Specific Goal 2   Problem-Specific Goal 2  Pt. will demonstrate an improvement in standing balance as evidenced by a Burrell Balance Test score of > than  or = to 41/56, indicating a low falls risk. at 07/15/2020 1035   Time Frame  long-term goal (LTG), 14 days or less at 07/15/2020 1035

## 2020-07-18 NOTE — PLAN OF CARE
Problem: Rehabilitation (IRF) Plan of Care  Goal: Plan of Care Review  Outcome: Progressing  Flowsheets (Taken 7/18/2020 1005)  Plan of Care Reviewed With: patient  IRF Plan of Care Review: progress ongoing, continue  Outcome Summary: Meds reviewed with pt

## 2020-07-19 ENCOUNTER — APPOINTMENT (INPATIENT)
Dept: OCCUPATIONAL THERAPY | Facility: REHABILITATION | Age: 70
DRG: 560 | End: 2020-07-19
Payer: MEDICARE

## 2020-07-19 ENCOUNTER — APPOINTMENT (INPATIENT)
Dept: PHYSICAL THERAPY | Facility: REHABILITATION | Age: 70
DRG: 560 | End: 2020-07-19
Payer: MEDICARE

## 2020-07-19 PROCEDURE — 97535 SELF CARE MNGMENT TRAINING: CPT | Mod: GO

## 2020-07-19 PROCEDURE — 63700000 HC SELF-ADMINISTRABLE DRUG: Performed by: HOSPITALIST

## 2020-07-19 PROCEDURE — 63600000 HC DRUGS/DETAIL CODE: Performed by: HOSPITALIST

## 2020-07-19 PROCEDURE — 12800001 HC ROOM AND CARE SEMIPRIVATE REHAB-BRAIN INJ

## 2020-07-19 PROCEDURE — 97530 THERAPEUTIC ACTIVITIES: CPT | Mod: GP

## 2020-07-19 PROCEDURE — 97116 GAIT TRAINING THERAPY: CPT | Mod: GP

## 2020-07-19 RX ADMIN — CYANOCOBALAMIN TAB 1000 MCG 1000 MCG: 1000 TAB at 09:28

## 2020-07-19 RX ADMIN — FAMOTIDINE 20 MG: 20 TABLET ORAL at 09:28

## 2020-07-19 RX ADMIN — PANTOPRAZOLE SODIUM 40 MG: 40 TABLET, DELAYED RELEASE ORAL at 09:28

## 2020-07-19 RX ADMIN — ROSUVASTATIN CALCIUM 5 MG: 5 TABLET, FILM COATED ORAL at 09:29

## 2020-07-19 RX ADMIN — TAMSULOSIN HYDROCHLORIDE 0.4 MG: 0.4 CAPSULE ORAL at 20:10

## 2020-07-19 RX ADMIN — POLYETHYLENE GLYCOL 3350 17 G: 17 POWDER, FOR SOLUTION ORAL at 09:31

## 2020-07-19 RX ADMIN — SENNOSIDES AND DOCUSATE SODIUM 1 TABLET: 8.6; 5 TABLET ORAL at 09:30

## 2020-07-19 RX ADMIN — HEPARIN SODIUM 5000 UNITS: 5000 INJECTION INTRAVENOUS; SUBCUTANEOUS at 21:16

## 2020-07-19 RX ADMIN — FAMOTIDINE 20 MG: 20 TABLET ORAL at 20:10

## 2020-07-19 RX ADMIN — MELATONIN 1000 UNITS: at 09:29

## 2020-07-19 RX ADMIN — HEPARIN SODIUM 5000 UNITS: 5000 INJECTION INTRAVENOUS; SUBCUTANEOUS at 06:18

## 2020-07-19 RX ADMIN — HEPARIN SODIUM 5000 UNITS: 5000 INJECTION INTRAVENOUS; SUBCUTANEOUS at 15:27

## 2020-07-19 NOTE — PROGRESS NOTES
Patient: Jael Herman  Location: [facility]  MRN: [id number]  Today's date: 7/19/2020    History of Present Illness  Jael Herman is a 70 y.o. male whose primary indication for inpatient rehabilitation is Spinal Cord, Non-Traumatic.  Pertinent History of Current Functional Problem:  He has a history of Spinal stenosis of lumbar region with neurogenic claudication [M48.062] and was admitted for a lumbar spinal fusion. Underwent L3-5 PLDF (posterior lumbar decompression, L4-5 posterior lumbar fusion with instrumentation.  by Dr. Gregorio Lancaster 7/6/20 at [facility]. Post op he had anemia but did not require transfusion. His pain was managed with Tylenol, Neurontin, Flexeril and Dilaudid.  He was put on SQ Heparin for DVT ppx. The patient was seen by PM&R and found to have residual deficits in ambulation and ADLs due to S/P lumbar spinal fusion [Z98.1] and was transferred to [facility] on 7/14/2020 for acute inpatient rehab.      Past Medical History  Jael has a past medical history of BPH (benign prostatic hyperplasia), DJD (degenerative joint disease), Hypertension, Lumbar stenosis, and BEN (obstructive sleep apnea).    PT Vitals    Date/Time Pulse SpO2 Pt Activity O2 Therapy BP BP Location BP Method Pt Position [facility]   07/19/20 1400 102 98 % At rest None (Room air) 126/76 Left upper arm Automatic Sitting MKW      PT Pain    Date/Time Pain Type Pref Pain Scale Location Rating: Rest Rating: Activity [facility]   07/19/20 1400 Pain Assessment number (Numeric Rating Pain Scale) abdomen 5 5 MKW   07/19/20 1424 Pain Reassessment number (Numeric Rating Pain Scale) abdomen 5 -- MKW          Prior Living Environment      Most Recent Value   Lives With  spouse   Living Arrangements  house   Home Accessibility  stairs to enter home (Group)   Number of Stairs, Main Entrance  4   Surface of Stairs, Main Entrance  concrete   Stair Railings, Main Entrance  railing on left side  (ascending)   Landing, Stairs, Main Entrance  railings present   Stairs Comment, Main Entrance  side or front entrance   Location, Kitchen  first (main) floor   Kitchen Access Comment  pt completes IADLs at home   Location, Patient Bedroom  second floor, must negotiate stairs to access   Patient Bedroom Access Comment  has bed on first floor   Location, Bathroom  first (main) floor, second floor, must negotiate stairs to access   Bathroom Access Comment  1st floor powder room, tub shower, wife has shower bench, renovating master bedroom closet into bathroom, standard toilets, no grab bars   Number of Stairs, Within Home, Secondary  other (see comments)   Stairs Comment, Within Home, Secondary  has stair lift for wife          Prior Level of Function      Most Recent Value   Dominant Hand  right   Ambulation  independent   Transferring  independent   Toileting  independent   Bathing  independent   Dressing  independent   Eating  independent   Equipment Currently Used at Home  none          IRF PT Evaluation and Treatment - 07/19/20 1400        Time Calculation    Start Time  1400     Stop Time  1430     Time Calculation (min)  30 min        Session Details    Document Type  daily treatment/progress note     Mode of Treatment  individual therapy;physical therapy        General Information    Patient Profile Reviewed?  yes        Sit to Stand Transfer    [place], Sit to Stand Transfer  close supervision;verbal cues     Verbal Cues  safety     Assistive Device  walker, front-wheeled     Comment  from         Stand to Sit Transfer    [place], Stand to Sit Transfer  close supervision;verbal cues     Verbal Cues  safety     Assistive Device  walker, front-wheeled     Comment  to         Stand Pivot Transfer    [place], Stand Pivot/Stand Step Transfer  close supervision;verbal cues     Verbal Cues  safety     Assistive Device  walker, front-wheeled     Comment  ambulatory approach to         Gait  "Training    [place], Gait  touching/steadying assist;verbal cues     Assistive Device  walker, front-wheeled     Distance in Feet  150 feet    2 trials    Gait Pattern Utilized  step-through     Right Sided Gait Deviations  --    decreased df during swing compared to L       Curb Negotiation (Mobility)    [place]  minimum assist (75% or more patient effort)     Comment  up/down two,4\" curbs with RW, assist for balance and walker management, v. cues for sequencing        Sloped Surface Gait Skills (Mobility)    [place]  minimum assist (75% or more patient effort)     Comment  up/down 30' ADA ramp with RW, assist for balance, v. cues for walker management        Stairs Training    [place], Stairs  touching/steadying assist;verbal cues     Assistive Device  railing     Handrail Location  both sides     Number of Stairs  12     Stair Height  6 inches     Ascending Stairs Technique  step-over-step     Descending Stairs Technique  step-over-step     Comment  v. cues for full foot on step and for sequencing        Lower Extremity (Therapeutic Exercise)    Exercise Position/Type (LE Therapeutic Exercise)  standing;AROM (active range of motion)     Comment (LE Therapeutic Exercise)  standing at ll bar: toe raises, marches, alt hip abd, alt hamstring curls x 10 each        Daily Progress Summary (PT)    Symptoms Noted During/After Treatment  none     Progress Toward Functional Goals (PT)  progressing toward functional goals as expected     Daily Outcome Statement (PT)  Tolerated session with no report of increased pain     Recommendations  Monitor response to treatment and progress as able.                            IRF PT Goals      Most Recent Value   Bed Mobility Goal 1   Activity/Assistive Device  bed mobility activities, all at 07/15/2020 1035   [place]  supervision required, verbal cues required at 07/15/2020 1035   Time Frame  short-term goal (STG), 5 - 7 days at 07/15/2020 1035   Bed Mobility " Goal 2   Activity/Assistive Device  bed mobility activities, all at 07/15/2020 1035   [place]  modified independence at 07/15/2020 1035   Time Frame  long-term goal (LTG), 14 days or less at 07/15/2020 1035   Transfer Goal 1   Activity/Assistive Device  sit-to-stand/stand-to-sit, bed-to-chair/chair-to-bed, stand pivot, car transfer at 07/15/2020 1035   [place]  supervision required, verbal cues required at 07/15/2020 1035   Time Frame  short-term goal (STG), 5 - 7 days at 07/15/2020 1035   Transfer Goal 2   Activity/Assistive Device  sit-to-stand/stand-to-sit, bed-to-chair/chair-to-bed, stand pivot, car transfer at 07/15/2020 1035   [place]  modified independence at 07/15/2020 1035   Time Frame  long-term goal (LTG), 14 days or less at 07/15/2020 1035   Gait/Walking Locomotion Goal 1   Activity/Assistive Device  gait (walking locomotion), diminish gait deviation, increase endurance/gait distance, improve balance and speed at 07/15/2020 1035   Distance  150 feet at 07/15/2020 1035   [place]  supervision required, verbal cues required at 07/15/2020 1035   Time Frame  short-term goal (STG), 5 - 7 days at 07/15/2020 1035   Gait/Walking Locomotion Goal 2   Activity/Assistive Device  gait (walking locomotion), diminish gait deviation, increase endurance/gait distance, improve balance and speed at 07/15/2020 1035   Distance  250 feet at 07/15/2020 1035   [place]  modified independence at 07/15/2020 1035   Time Frame  long-term goal (LTG), 14 days or less at 07/15/2020 1035   Stairs Goal 1   Activity/Assistive Device  stairs, all skills, using handrail, left, using handrail, right at 07/15/2020 1035   Number of Stairs  12 at 07/15/2020 1035   [place]  supervision required, verbal cues required at 07/15/2020 1035   Time Frame  short-term goal (STG), 5 - 7 days at 07/15/2020 1035   Stairs Goal 2   Activity/Assistive Device  stairs, all skills [using 1 railing.] at 07/15/2020 1035   Number of  Stairs  12 at 07/15/2020 1035   [place]  modified independence at 07/15/2020 1035   Time Frame  long-term goal (LTG), 14 days or less at 07/15/2020 1035   Problem Specific Goal 1   Problem-Specific Goal 1  Pt. will demonstrate an improvement in standing balance as evidenced by a Burrell Balance Test score of > than or = to 32/56. at 07/15/2020 1035   Time Frame  short-term goal (STG), 5 - 7 days at 07/15/2020 1035   Problem Specific Goal 2   Problem-Specific Goal 2  Pt. will demonstrate an improvement in standing balance as evidenced by a Burrell Balance Test score of > than or = to 41/56, indicating a low falls risk. at 07/15/2020 1035   Time Frame  long-term goal (LTG), 14 days or less at 07/15/2020 1035

## 2020-07-19 NOTE — PROGRESS NOTES
Patient: Jael Herman  Location: [facility] [address]  MRN: [id number]  Today's date: 7/19/2020    History of Present Illness  Jael Herman is a 70 y.o. male whose primary indication for inpatient rehabilitation is Spinal Cord, Non-Traumatic.  Pertinent History of Current Functional Problem:  He has a history of Spinal stenosis of lumbar region with neurogenic claudication [M48.062] and was admitted for a lumbar spinal fusion. Underwent L3-5 PLDF (posterior lumbar decompression, L4-5 posterior lumbar fusion with instrumentation.  by Dr. Gregorio Lancaster 7/6/20 at [facility]. Post op he had anemia but did not require transfusion. His pain was managed with Tylenol, Neurontin, Flexeril and Dilaudid.  He was put on SQ Heparin for DVT ppx. The patient was seen by PM&R and found to have residual deficits in ambulation and ADLs due to S/P lumbar spinal fusion [Z98.1] and was transferred to [facility] on 7/14/2020 for acute inpatient rehab.      Past Medical History  Jael has a past medical history of BPH (benign prostatic hyperplasia), DJD (degenerative joint disease), Hypertension, Lumbar stenosis, and BEN (obstructive sleep apnea).        Prior Living Environment      Most Recent Value   Lives With  spouse   Living Arrangements  house   Home Accessibility  stairs to enter home (Group)   Number of Stairs, Main Entrance  4   Surface of Stairs, Main Entrance  concrete   Stair Railings, Main Entrance  railing on left side (ascending)   Landing, Stairs, Main Entrance  railings present   Stairs Comment, Main Entrance  side or front entrance   Location, Kitchen  first (main) floor   Kitchen Access Comment  pt completes IADLs at home   Location, Patient Bedroom  second floor, must negotiate stairs to access   Patient Bedroom Access Comment  has bed on first floor   Location, Bathroom  first (main) floor, second floor, must negotiate stairs to access   Bathroom Access Comment  1st  floor powder room, tub shower, wife has shower bench, renovating master bedroom closet into bathroom, standard toilets, no grab bars   Number of Stairs, Within Home, Secondary  other (see comments)   Stairs Comment, Within Home, Secondary  has stair lift for wife          Prior Level of Function      Most Recent Value   Dominant Hand  right   Ambulation  independent   Transferring  independent   Toileting  independent   Bathing  independent   Dressing  independent   Eating  independent   Equipment Currently Used at Home  none          IRF OT Evaluation and Treatment - 07/19/20 1105        Time Calculation    Start Time  1100     Stop Time  1130     Time Calculation (min)  30 min        Session Details    Document Type  daily treatment/progress note     Mode of Treatment  occupational therapy;individual therapy        General Information    General Observations of Patient  pt seated at sink completed grooming at start of session        Sit to Stand Transfer    [place], Sit to Stand Transfer  close supervision     Verbal Cues  safety     Assistive Device  walker, front-wheeled        Stand to Sit Transfer    [place], Stand to Sit Transfer  close supervision     Assistive Device  walker, front-wheeled        Stand Pivot Transfer    [place], Stand Pivot/Stand Step Transfer  close supervision     Verbal Cues  hand placement;safety;technique     Assistive Device  walker, front-wheeled     Comment  SPT w/c <> EOB        Upper Body Dressing    Self-Performance  threads left arm, shirt;threads right arm, shirt;pulls shirt over head/around back;pulls shirt down/adjusts;orthosis application     [place]  set up;close supervision     Position  edge of bed sitting        Lower Body Dressing    Self-Performance  threads left leg, pants/shorts;threads right leg, pants/shorts;pulls pants/shorts up or down;threads left leg, underpants;threads right leg, underpants;pulls underpants up or down    declines changing  footwear    [place]  close supervision     Position  edge of bed sitting;supported sitting     Adaptive Equipment  reacher        Grooming    Self-Performance  washes, rinses and dries face;washes, rinses and dries hands;brushes/pressley hair;oral care (brushing teeth, cleaning dentures)     [place]  set up;supervision     Position  sink side;supported sitting        Daily Progress Summary (OT)    Daily Outcome Statement (OT)  Pt in room at start of session. Not yet dressing. [place] improving I with ADLs. Rec ongoing OT to max I and safety prior to d/c.,                             IRF OT Goals      Most Recent Value   Transfer Goal 1   Activity/Assistive Device  toilet at 07/15/2020 0740   [place]  supervision required at 07/15/2020 0740   Time Frame  short-term goal (STG), 1 week at 07/15/2020 0740   Transfer Goal 2   Activity/Assistive Device  toilet at 07/15/2020 0740   [place]  modified independence at 07/15/2020 0740   Time Frame  long-term goal (LTG), 2 weeks at 07/15/2020 0740   Transfer Goal 3   Activity/Assistive Device  shower at 07/15/2020 0740   [place]  set-up required, supervision required at 07/15/2020 0740   Time Frame  short-term goal (STG), 1 week at 07/15/2020 0740   Transfer Goal 4   Activity/Assistive Device  shower at 07/15/2020 0740   [place]  set-up required, supervision required at 07/15/2020 0740   Time Frame  long-term goal (LTG), 2 weeks at 07/15/2020 0740   Bathing Goal 1   [place]  minimum assist (75% or more patient effort) at 07/15/2020 0740   Time Frame  short-term goal (STG), 1 week at 07/15/2020 0740   Bathing Goal 2   [place]  supervision required, set-up required at 07/15/2020 0740   Time Frame  short-term goal (STG), 1 week at 07/15/2020 0740   UB Dressing Goal 1   [place]  set-up required, supervision required at 07/15/2020 0740   Time Frame  short-term goal (STG), 1 week at 07/15/2020 0740   UB Dressing Goal 2   [place]  modified  independence at 07/15/2020 0740   Time Frame  long-term goal (LTG), 2 weeks at 07/15/2020 0740   LB Dressing Goal 1   [place]  moderate assist (50-74% patient effort) at 07/15/2020 0740   Time Frame  short-term goal (STG), 1 week at 07/15/2020 0740   LB Dressing Goal 2   [place]  modified independence at 07/15/2020 0740   Time Frame  long-term goal (LTG), 2 weeks at 07/15/2020 0740   Grooming Goal 1   [place]  supervision required at 07/15/2020 0740   Time Frame  short-term goal (STG), 1 week at 07/15/2020 0740   Strategies/Barriers  in stance at 07/15/2020 0740   Grooming Goal 2   [place]  modified independence at 07/15/2020 0740   Time Frame  long-term goal (LTG), 2 weeks at 07/15/2020 0740   Strategies/Barriers  in stance at 07/15/2020 0740   Toileting Goal 1   [place]  minimum assist (75% or more patient effort) at 07/15/2020 0740   Time Frame  short-term goal (STG), 1 week at 07/15/2020 0740   Toileting Goal 2   [place]  modified independence at 07/15/2020 0740   Time Frame  long-term goal (LTG), 2 weeks at 07/15/2020 0740

## 2020-07-19 NOTE — PLAN OF CARE
Problem: Rehabilitation (IRF) Plan of Care  Goal: Plan of Care Review  Outcome: Progressing  Flowsheets (Taken 7/19/2020 0436)  IRF Plan of Care Review: progress ongoing, continue  Outcome Summary: slept well

## 2020-07-19 NOTE — PLAN OF CARE
Problem: Rehabilitation (IRF) Plan of Care  Goal: Plan of Care Review  Outcome: Progressing  Flowsheets (Taken 7/19/2020 1366)  Plan of Care Reviewed With: patient  IRF Plan of Care Review: progress ongoing, continue  Outcome Summary: Improving I with LBD.

## 2020-07-19 NOTE — PLAN OF CARE
Problem: Rehabilitation (IRF) Plan of Care  Goal: Plan of Care Review  Outcome: Progressing  Flowsheets (Taken 7/19/2020 1154)  Plan of Care Reviewed With: patient  IRF Plan of Care Review: progress ongoing, continue  Outcome Summary: Meds reviewed with pt

## 2020-07-19 NOTE — PROGRESS NOTES
Edited by Anoop Yeh MD, 7/19/2020  7:29 PM        Anoop Yeh MD   Physician   Physical Medicine and Rehabilitation   Progress Notes   Signed   Date of Service:  7/19/2020  4:51 PM                         []Hide copied text    [][place] for details  Subjective     Patient seen and examined on rounds.  Chart reviewed.  Events overnight noted.  History reviewed briefly with patient.     CC:  Deficits in mobility, transfers, self-care status post posterior lumbar decompression and fusion, lumbar stenosis, lumbar radiculopathy     HPI:  Mr. Jael Herman  is a 70-year-old right-handed -American gentleman with chronic conditions significant for hypertension, hyperlipidemia, benign process hypertrophy, sleep apnea, lumbar stenosis who had chronic low back pain for over 20 years and pain extending down his lower extremities especially right lower extremity with progressive weakness in his right lower extremity over the past year and he says he was noted to have a right foot drop.  He was diagnosed with lumbar spinal stenosis and underwent L3-5 PLDF by Dr. Gregorio Lancaster on 7/6/20 at [facility].  Postoperatively he is allowed weightbearing as tolerated on both lower extremities.  He was recommended a LSO brace for use when out of bed.  He did have urinary frequency postoperatively.  Drains were removed on 7/12/20.  Postoperative course was significant for abdominal pain and he reports multiple imaging studies were done of his abdomen and he also had barium follow-through.  He was seen by surgical team for his abdominal pain.  He reports abdominal x-rays were negative for obstruction.  Amylase, lipase were not elevated.  He says that when he takes Gabapentin he gets abdominal pain and he noticed that even before his surgery.  We will hold that for now.  Also he will be kept on Protonix/Pepcid.  He is on subcutaneous Heparin for DVT prophylaxis.  He is also on Dilaudid and  "Flexeril for pain control. He has been kept on bowel medications for his constipation issues.  He still reports ongoing abdominal pain and I mentioned to him that we will get another x-ray of his abdomen tomorrow.  If his abdominal pain gets worse, he may need to go to the ER for evaluation. On 7/10/20, hemoglobin was 10.1, WBC count 8.3, BUN 15, creatinine 0.9.  He has been needing assistance for mobility, transfers, self-care postoperatively. He is transferred to [place] rehabilitation on 7/14/20 for further rehabilitation care.     SUBJ: Ambulating well in physical therapy.  He denies much abdominal pain today.  He indicates he has follow-up appointment at his surgeon's office on Thursday 7/23 at 1:15 PM.  Order written to that effect.  Discussed with nursing.  He says he is hoping to be discharged by next weekend.  Discussed with him he will be team next week.      ROS: Denies chest pain or shortness of breath. Other ROS negative. Past, family, social history is unchanged.        Physical Exam      Blood pressure (!) 141/75, pulse (!) 103, temperature 36.7 °C (98 °F), temperature source Oral, resp. rate 16, height 1.803 m (5' 11\"), weight 81.6 kg (180 lb), SpO2 98 %.  Body mass index is 25.1 kg/m².     General Appearance: Not in acute distress  Head/Ear/Nose/Throat: Normocephalic; Atraumatic.   Eye: EOMI; PERRL.   Neck: No JVD; No Bruits.   Respiratory: Decreased breath sounds at bases.   Cardiovascular: RRR; Normal S1, S2.   Gastrointestinal: Soft; NT; +BS.   Extremities: Bilateral lower extremity edema noted.  Incision lumbar spine covered by dressing.  Musculoskeletal: Functional active range of motion in both upper extremities.  He is able to do active straight leg raise in both lower extremities but has weakness in right ankle dorsiflexion and he says he has a known right foot drop.  Neurological: AAO ×3. Speech is fluent. Cranial nerve examination does not reveal any gross facial asymmetry. Strength " testing about 4+/5 strength in both upper extremities.  Bilateral hip flexion is 4/5.  Bilateral quadriceps are 4/5.  Right ankle dorsiflexion is 2+/5.  Right ankle plantar flexion is 4+/5.  Left ankle dorsi and plantar flexion of 4+/5.  He is grossly able to localize touch and position sense in great toes but has some numbness in his legs and feet.  Deep tendon reflexes are hypoactive and symmetric bilaterally.  Plantars are flexor.  Coordination is functional upper extremities.    Neurologically stable.  Behavior/Emotional: Appropriate; Cooperative.   Skin: No obvious rashes noted.  Incision lumbar spine covered with dressing.  Sutures in place.  Incision healing well as discussed with nursing.        Current Facility-Administered Medications:   •  acetaminophen (TYLENOL) tablet 650 mg, 650 mg, oral, q4h PRN, Fredis Lyles MD  •  albuterol HFA (VENTOLIN HFA) 90 mcg/actuation inhaler 2 puff, 2 puff, inhalation, q6h PRN, Fredis Lyles MD  •  alum-mag hydroxide-simeth (MAALOX) 200-200-20 mg/5 mL suspension 30 mL, 30 mL, oral, q6h PRN, Fredis Lyles MD  •  bisacodyL (DULCOLAX) 10 mg suppository 10 mg, 10 mg, rectal, Daily PRN, Fredis Lyles MD  •  calcium carbonate (TUMS) chewable tablet 1,000 mg, 1,000 mg, oral, 3x daily PRN, Fredis Lyles MD  •  cholecalciferol (vitamin D3) tablet 1,000 Units, 1,000 Units, oral, Daily, Fredis Lyles MD, 1,000 Units at 07/19/20 0929  •  cyanocobalamin (VITAMIN B12) tablet 1,000 mcg, 1,000 mcg, oral, Daily, Fredis Lyles MD, 1,000 mcg at 07/19/20 0928  •  cyclobenzaprine (FLEXERIL) tablet 10 mg, 10 mg, oral, 3x daily PRN, Fredis Lyles MD  •  famotidine (PEPCID) tablet 20 mg, 20 mg, oral, BID, Fredis Lyles MD, 20 mg at 07/19/20 0928  •  heparin (porcine) 5,000 unit/mL injection 5,000 Units, 5,000 Units, subcutaneous, q8h Rafal BUTLER Scott, MD, 5,000 Units at 07/19/20 1527  •  HYDROmorphone (DILAUDID) tablet 2-4 mg, 2-4 mg,  oral, q4h PRN, Fredis Lyles MD, 4 mg at 07/15/20 1242  •  losartan (COZAAR) tablet 50 mg, 50 mg, oral, Daily, Fredis Lyles MD, 50 mg at 07/18/20 0814  •  ondansetron ODT (ZOFRAN-ODT) disintegrating tablet 4 mg, 4 mg, oral, q8h PRN, [facility], MD Escobar, 4 mg at 07/16/20 1914  •  pantoprazole (PROTONIX) tablet,delayed release (DR/EC) 40 mg, 40 mg, oral, Daily before breakfast, Fredis Lyles MD, 40 mg at 07/19/20 0928  •  polyethylene glycol (MIRALAX) 17 gram packet 17 g, 17 g, oral, Daily, Fredis Lyles MD, 17 g at 07/19/20 0931  •  polyethylene glycol (MIRALAX) 17 gram packet 17 g, 17 g, oral, Daily PRN, Fredis Lyles MD  •  rosuvastatin (CRESTOR) tablet 5 mg, 5 mg, oral, Daily, Fredis Lyles MD, 5 mg at 07/19/20 0929  •  sennosides-docusate sodium (SENOKOT-S) 8.6-50 mg per tablet 1 tablet, 1 tablet, oral, BID, Fredis Lyles MD, 1 tablet at 07/19/20 0930  •  tamsulosin (FLOMAX) 24 hr ER capsule 0.4 mg, 0.4 mg, oral, Nightly, Fredis Lyles MD, 0.4 mg at 07/18/20 2041         Labs / Radiology           Lab Results   Component Value Date     WBC 8.99 07/15/2020     HGB 11.8 (L) 07/15/2020     HCT 35.5 (L) 07/15/2020     MCV 93.9 07/15/2020      (H) 07/15/2020            Lab Results   Component Value Date     GLUCOSE 108 (H) 07/15/2020     CALCIUM 9.3 07/15/2020      07/15/2020     K 4.2 07/15/2020     CO2 24 07/15/2020      07/15/2020     BUN 19 07/15/2020     CREATININE 1.1 07/15/2020         Assessment and Plan     ASSESSMENT PLAN:  1. 70-year-old right-handed -American gentleman with chronic conditions significant for hypertension, hyperlipidemia, benign process hypertrophy, sleep apnea, lumbar stenosis who had chronic low back pain for over 20 years and pain extending down his lower extremities especially right lower extremity with progressive weakness in his right lower extremity over the past year and he says he was noted to have a right foot  drop.  He was diagnosed with lumbar spinal stenosis and underwent L3-5 PLDF by Dr. Gregorio Lancaster on 7/6/20 at [facility].  Postoperatively he is allowed weightbearing as tolerated on both lower extremities.  He was recommended a LSO brace for use when out of bed.  He did have urinary frequency postoperatively.  Drains were removed on 7/12/20.  Postoperative course was significant for abdominal pain and he reports multiple imaging studies were done of his abdomen and he also had barium follow-through.  He was seen by surgical team for his abdominal pain.  He reports abdominal x-rays were negative for obstruction.  Amylase, lipase were not elevated.  He says that when he takes Gabapentin he gets abdominal pain and he noticed that even before his surgery.  We will hold that for now.  Also he will be kept on Protonix/Pepcid.  He is on subcutaneous Heparin for DVT prophylaxis.  He is also on Dilaudid and Flexeril for pain control. He has been kept on bowel medications for his constipation issues.  He still reports ongoing abdominal pain and I mentioned to him that we will get another x-ray of his abdomen tomorrow.  If his abdominal pain gets worse, he may need to go to the ER for evaluation. On 7/10/20, hemoglobin was 10.1, WBC count 8.3, BUN 15, creatinine 0.9.  He has been needing assistance for mobility, transfers, self-care postoperatively. He is transferred to [place] rehabilitation on 7/14/20 for further rehabilitation care.     2. DVT prophylaxis - on Heparin.  On SCDs.  Platelets 385 on 7/15/2020.     3.  Lumbar stenosis - status post posterior lumbar decompression and fusion, lumbar stenosis, lumbar radiculopathy - continue PT, OT, psychology.  Monitor healing of lumbar incision.  He has chronic right foot drop.     4. GI - On Protonix for GI prophylaxis. On Pepcid.  MiraLAX, Senokot-S.  On PRN Dulcolax.  On PRN Maalox.  On Tums when necessary.  On PRN MiraLAX.  We will get abdominal x-ray  tomorrow due to ongoing abdominal pain.Discussed results of abdominal x-ray with him.  He had some left upper quadrant pain today while in therapy and had to return back to room.  Discussed with him we can send him to [facility] for evaluation but he wants to hold off.  He wants to wait another day and see how he feels tomorrow and if he is any worse he will go to the ER for evaluation for further work-up.  Discussed with nurse.  Discussed with patient that if he feels worse in the evening or at nighttime house physician can evaluate him and send him to [facility] for evaluation if needed.He reports abdominal pain is less today.  He does not feel the need to go to the ER.  Progressing in therapy.  He reports decreased abdominal pain today.  He reports he will take MiraLAX tonight as he has some constipation.     5.  - voiding.  Denies dysuria.  Monitor post void residuals.  He has some urinary frequency.  On Flomax.     6.  Hypertension -  on Cozaar.     7. Pain - on when necessary Dilaudid.  On Flexeril PRN.  On Tylenol PRN.  Gabapentin was discontinued as patient says it causes abdominal pain for him.  He noticed this even prior to his surgery per patient.     8. Hematology -hemoglobin 11.8, WBC 8.99 on 11/15/2020.     9.  Hyperlipidemia - on Crestor.      10. F/E/N - on vitamin D.     11. Rehabilitation medicine - Continue comprehensive rehabilitation care. Continue PT, OT, speech, psychology.  We will follow falls precautions, cardiac precautions, monitor pulse oximetry in therapy and follow aspiration precautions.  We will follow spinal precautions.He reports he still had some abdominal pain today but is decreasing.  Abdominal x-rays were ordered.  Tolerating diet.Discussed results of abdominal x-ray with him.  He had some left upper quadrant pain today while in therapy and had to return back to room.  Discussed with him we can send him to [facility] for evaluation but he wants to hold off.  He wants to wait  another day and see how he feels tomorrow and if he is any worse he will go to the ER for evaluation for further work-up.  Discussed with nurse.  Discussed with patient that if he feels worse in the evening or at nighttime house physician can evaluate him and send him to [facility] for evaluation if needed.He reports abdominal pain is less today.  He does not feel the need to go to the ER.  Progressing in therapy.  He is ambulating up to 150 feet in therapy close supervision to min assist.  Working with occupational therapy.  He reports he will take MiraLAX tonight as he has some constipation.Progressing in therapy.  Decreased abdominal pain per patient.  Able to walk better in therapy. Ambulating well in physical therapy.  He denies much abdominal pain today.  He indicates he has follow-up appointment at his surgeon's office on Thursday 7/23 at 1:15 PM.  Order written to that effect.  Discussed with nursing.  He says he is hoping to be discharged by next weekend.  Discussed with him he will be team next week.      12. Reviewed labs today.  BUN 19, creatinine 1.1 on 7/15/2020.     13.  I will be away the week of Monday 7/20/2020 to Friday 7/24/2020.  [facility] physician to cover during my absence. Discussed with patient.        Anoop Yeh MD  7/19/2020

## 2020-07-19 NOTE — PLAN OF CARE
Problem: Rehabilitation (IRF) Plan of Care  Goal: Plan of Care Review  Flowsheets (Taken 7/19/2020 9464)  Plan of Care Reviewed With: patient  IRF Plan of Care Review: progress ongoing, continue  Outcome Summary: Tolerated session with no report of increased pain

## 2020-07-20 ENCOUNTER — APPOINTMENT (INPATIENT)
Dept: PHYSICAL THERAPY | Facility: REHABILITATION | Age: 70
DRG: 560 | End: 2020-07-20
Payer: MEDICARE

## 2020-07-20 ENCOUNTER — APPOINTMENT (INPATIENT)
Dept: OCCUPATIONAL THERAPY | Facility: REHABILITATION | Age: 70
DRG: 560 | End: 2020-07-20
Payer: MEDICARE

## 2020-07-20 LAB
ALBUMIN SERPL-MCNC: 3.8 G/DL (ref 3.4–5)
ALP SERPL-CCNC: 64 IU/L (ref 35–126)
ALT SERPL-CCNC: 22 IU/L (ref 16–63)
ANION GAP SERPL CALC-SCNC: 10 MEQ/L (ref 3–15)
AST SERPL-CCNC: 17 IU/L (ref 15–41)
BILIRUB SERPL-MCNC: 1.1 MG/DL (ref 0.3–1.2)
BUN SERPL-MCNC: 20 MG/DL (ref 8–20)
CALCIUM SERPL-MCNC: 9.6 MG/DL (ref 8.9–10.3)
CHLORIDE SERPL-SCNC: 102 MEQ/L (ref 98–109)
CO2 SERPL-SCNC: 25 MEQ/L (ref 22–32)
CREAT SERPL-MCNC: 1.2 MG/DL (ref 0.8–1.3)
ERYTHROCYTE [DISTWIDTH] IN BLOOD BY AUTOMATED COUNT: 13.3 % (ref 11.6–14.4)
GFR SERPL CREATININE-BSD FRML MDRD: >60 ML/MIN/1.73M*2
GLUCOSE SERPL-MCNC: 110 MG/DL (ref 70–99)
HCT VFR BLDCO AUTO: 37.9 % (ref 40.1–51)
HGB BLD-MCNC: 12.3 G/DL (ref 13.7–17.5)
MAGNESIUM SERPL-MCNC: 2.2 MG/DL (ref 1.8–2.5)
MCH RBC QN AUTO: 30.8 PG (ref 28–33.2)
MCHC RBC AUTO-ENTMCNC: 32.5 G/DL (ref 32.2–36.5)
MCV RBC AUTO: 94.8 FL (ref 83–98)
PDW BLD AUTO: 10.9 FL (ref 9.4–12.4)
PLATELET # BLD AUTO: 325 K/UL (ref 150–350)
POTASSIUM SERPL-SCNC: 4.7 MEQ/L (ref 3.6–5.1)
PROT SERPL-MCNC: 7.3 G/DL (ref 6–8.2)
RBC # BLD AUTO: 4 M/UL (ref 4.5–5.8)
SODIUM SERPL-SCNC: 137 MEQ/L (ref 136–144)
WBC # BLD AUTO: 7.48 K/UL (ref 3.8–10.5)

## 2020-07-20 PROCEDURE — 12800001 HC ROOM AND CARE SEMIPRIVATE REHAB-BRAIN INJ

## 2020-07-20 PROCEDURE — 97110 THERAPEUTIC EXERCISES: CPT | Mod: GP

## 2020-07-20 PROCEDURE — 97530 THERAPEUTIC ACTIVITIES: CPT | Mod: GP

## 2020-07-20 PROCEDURE — 83735 ASSAY OF MAGNESIUM: CPT | Performed by: HOSPITALIST

## 2020-07-20 PROCEDURE — 63600000 HC DRUGS/DETAIL CODE: Performed by: HOSPITALIST

## 2020-07-20 PROCEDURE — 80053 COMPREHEN METABOLIC PANEL: CPT | Performed by: HOSPITALIST

## 2020-07-20 PROCEDURE — 97530 THERAPEUTIC ACTIVITIES: CPT | Mod: GO

## 2020-07-20 PROCEDURE — 97110 THERAPEUTIC EXERCISES: CPT | Mod: GO

## 2020-07-20 PROCEDURE — 97535 SELF CARE MNGMENT TRAINING: CPT | Mod: GO

## 2020-07-20 PROCEDURE — 97116 GAIT TRAINING THERAPY: CPT | Mod: GP

## 2020-07-20 PROCEDURE — 85027 COMPLETE CBC AUTOMATED: CPT | Performed by: HOSPITALIST

## 2020-07-20 PROCEDURE — 36415 COLL VENOUS BLD VENIPUNCTURE: CPT | Performed by: HOSPITALIST

## 2020-07-20 PROCEDURE — 63700000 HC SELF-ADMINISTRABLE DRUG: Performed by: HOSPITALIST

## 2020-07-20 RX ADMIN — PANTOPRAZOLE SODIUM 40 MG: 40 TABLET, DELAYED RELEASE ORAL at 08:53

## 2020-07-20 RX ADMIN — HEPARIN SODIUM 5000 UNITS: 5000 INJECTION INTRAVENOUS; SUBCUTANEOUS at 21:28

## 2020-07-20 RX ADMIN — ROSUVASTATIN CALCIUM 5 MG: 5 TABLET, FILM COATED ORAL at 08:53

## 2020-07-20 RX ADMIN — FAMOTIDINE 20 MG: 20 TABLET ORAL at 20:36

## 2020-07-20 RX ADMIN — POLYETHYLENE GLYCOL 3350 17 G: 17 POWDER, FOR SOLUTION ORAL at 08:54

## 2020-07-20 RX ADMIN — SENNOSIDES AND DOCUSATE SODIUM 1 TABLET: 8.6; 5 TABLET ORAL at 20:36

## 2020-07-20 RX ADMIN — LOSARTAN POTASSIUM 50 MG: 50 TABLET, FILM COATED ORAL at 08:54

## 2020-07-20 RX ADMIN — CYANOCOBALAMIN TAB 1000 MCG 1000 MCG: 1000 TAB at 08:53

## 2020-07-20 RX ADMIN — HEPARIN SODIUM 5000 UNITS: 5000 INJECTION INTRAVENOUS; SUBCUTANEOUS at 06:18

## 2020-07-20 RX ADMIN — FAMOTIDINE 20 MG: 20 TABLET ORAL at 08:53

## 2020-07-20 RX ADMIN — MELATONIN 1000 UNITS: at 08:53

## 2020-07-20 RX ADMIN — HEPARIN SODIUM 5000 UNITS: 5000 INJECTION INTRAVENOUS; SUBCUTANEOUS at 15:05

## 2020-07-20 RX ADMIN — TAMSULOSIN HYDROCHLORIDE 0.4 MG: 0.4 CAPSULE ORAL at 20:36

## 2020-07-20 RX ADMIN — SENNOSIDES AND DOCUSATE SODIUM 1 TABLET: 8.6; 5 TABLET ORAL at 08:53

## 2020-07-20 NOTE — PROGRESS NOTES
Subjective    Patient seen and examined on rounds.  Chart reviewed.  Events overnight noted.  History reviewed briefly with patient.    CC:  Deficits in mobility, transfers, self-care status post posterior lumbar decompression and fusion, lumbar stenosis, lumbar radiculopathy    HPI:  Mr. Jael Herman  is a 70-year-old right-handed -American gentleman with chronic conditions significant for hypertension, hyperlipidemia, benign process hypertrophy, sleep apnea, lumbar stenosis who had chronic low back pain for over 20 years and pain extending down his lower extremities especially right lower extremity with progressive weakness in his right lower extremity over the past year and he says he was noted to have a right foot drop.  He was diagnosed with lumbar spinal stenosis and underwent L3-5 PLDF by Dr. Gregorio Lancaster on 7/6/20 at [facility].  Postoperatively he is allowed weightbearing as tolerated on both lower extremities.  He was recommended a LSO brace for use when out of bed.  He did have urinary frequency postoperatively.  Drains were removed on 7/12/20.  Postoperative course was significant for abdominal pain and he reports multiple imaging studies were done of his abdomen and he also had barium follow-through.  He was seen by surgical team for his abdominal pain.  He reports abdominal x-rays were negative for obstruction.  Amylase, lipase were not elevated.  He says that when he takes Gabapentin he gets abdominal pain and he noticed that even before his surgery.  We will hold that for now.  Also he will be kept on Protonix/Pepcid.  He is on subcutaneous Heparin for DVT prophylaxis.  He is also on Dilaudid and Flexeril for pain control. He has been kept on bowel medications for his constipation issues.  He still reports ongoing abdominal pain and I mentioned to him that we will get another x-ray of his abdomen tomorrow.  If his abdominal pain gets worse, he may need to go to the  "ER for evaluation. On 7/10/20, hemoglobin was 10.1, WBC count 8.3, BUN 15, creatinine 0.9.  He has been needing assistance for mobility, transfers, self-care postoperatively. He is transferred to [facility] on 7/14/20 for further rehabilitation care.    SUBJ:  Pain controlled.  Constipated, but did move bowels 2 days ago.  Noted to have low BP in therapy today.  D/w IM/Dr. Lyles and will adjust holding parameters for cozaar. He is scheduled for suture removal with surgeon Dr. Lancaster on Thursday 1pm.  Denies any fever, chills, sweat, abd discomfort, new numbness, tingling or weakness.    ROS: Denies chest pain or shortness of breath. Other ROS negative. Past, family, social history is unchanged.      Physical Exam      Blood pressure 90/60, pulse (!) 119, temperature 37.1 °C (98.8 °F), temperature source Oral, resp. rate 18, height 1.803 m (5' 11\"), weight 81.6 kg (180 lb), SpO2 98 %.  Body mass index is 25.1 kg/m².      Physical Exam   Constitutional: He is oriented to person, place, and time. Vital signs are normal. He appears well-developed and well-nourished.   HENT:   Head: Normocephalic and atraumatic.   Eyes: Pupils are equal, round, and reactive to light. Conjunctivae and EOM are normal.   Cardiovascular: Normal rate and regular rhythm.   Pulmonary/Chest: Effort normal and breath sounds normal.   Abdominal: Soft. Normal appearance and bowel sounds are normal.   Genitourinary:   Genitourinary Comments: No brand catheter   Musculoskeletal: Normal range of motion.   No jt erythema, warmth or effusion   Neurological: He is alert and oriented to person, place, and time. He displays no tremor. No cranial nerve deficit. He exhibits normal muscle tone. He displays no seizure activity. Coordination normal.   Fluent, follows commands, strength >3/5 bilat, tone 0/4.   Skin: Skin is warm, dry and intact.        Psychiatric: He has a normal mood and affect. His speech is normal and behavior is normal. "         Current Facility-Administered Medications:   •  acetaminophen (TYLENOL) tablet 650 mg, 650 mg, oral, q4h PRN, Fredis Lyles MD  •  albuterol HFA (VENTOLIN HFA) 90 mcg/actuation inhaler 2 puff, 2 puff, inhalation, q6h PRN, Fredis Lyles MD  •  alum-mag hydroxide-simeth (MAALOX) 200-200-20 mg/5 mL suspension 30 mL, 30 mL, oral, q6h PRN, Fredis Lyles MD  •  bisacodyL (DULCOLAX) 10 mg suppository 10 mg, 10 mg, rectal, Daily PRN, Fredis Lyles MD  •  calcium carbonate (TUMS) chewable tablet 1,000 mg, 1,000 mg, oral, 3x daily PRN, Fredis Lyles MD  •  cholecalciferol (vitamin D3) tablet 1,000 Units, 1,000 Units, oral, Daily, Fredis Lyles MD, 1,000 Units at 07/20/20 0853  •  cyanocobalamin (VITAMIN B12) tablet 1,000 mcg, 1,000 mcg, oral, Daily, Fredis Lyles MD, 1,000 mcg at 07/20/20 0853  •  cyclobenzaprine (FLEXERIL) tablet 10 mg, 10 mg, oral, 3x daily PRN, Fredis Lyles MD  •  famotidine (PEPCID) tablet 20 mg, 20 mg, oral, BID, Fredis Lyles MD, 20 mg at 07/20/20 0853  •  heparin (porcine) 5,000 unit/mL injection 5,000 Units, 5,000 Units, subcutaneous, q8h CARRIE, Fredis Lyles MD, 5,000 Units at 07/20/20 1505  •  HYDROmorphone (DILAUDID) tablet 2-4 mg, 2-4 mg, oral, q4h PRN, Fredis Lyles MD, 4 mg at 07/15/20 1242  •  losartan (COZAAR) tablet 50 mg, 50 mg, oral, Daily, Fredis Lyles MD, 50 mg at 07/20/20 0854  •  ondansetron ODT (ZOFRAN-ODT) disintegrating tablet 4 mg, 4 mg, oral, q8h PRN, [facility], 4 mg at 07/16/20 1914  •  pantoprazole (PROTONIX) tablet,delayed release (DR/EC) 40 mg, 40 mg, oral, Daily before breakfast, Fredis Lyles MD, 40 mg at 07/20/20 0853  •  polyethylene glycol (MIRALAX) 17 gram packet 17 g, 17 g, oral, Daily, Fredis Lyles MD, 17 g at 07/20/20 0854  •  polyethylene glycol (MIRALAX) 17 gram packet 17 g, 17 g, oral, Daily PRN, Fredis Lyles MD  •  rosuvastatin (CRESTOR) tablet 5 mg, 5 mg,  oral, Daily, Fredis Lyles MD, 5 mg at 07/20/20 0853  •  sennosides-docusate sodium (SENOKOT-S) 8.6-50 mg per tablet 1 tablet, 1 tablet, oral, BID, Fredis Lyles MD, 1 tablet at 07/20/20 0853  •  tamsulosin (FLOMAX) 24 hr ER capsule 0.4 mg, 0.4 mg, oral, Nightly, Fredis Llyes MD, 0.4 mg at 07/19/20 2010       Labs / Radiology    Lab Results   Component Value Date    WBC 7.48 07/20/2020    HGB 12.3 (L) 07/20/2020    HCT 37.9 (L) 07/20/2020    MCV 94.8 07/20/2020     07/20/2020     Lab Results   Component Value Date    GLUCOSE 110 (H) 07/20/2020    CALCIUM 9.6 07/20/2020     07/20/2020    K 4.7 07/20/2020    CO2 25 07/20/2020     07/20/2020    BUN 20 07/20/2020    CREATININE 1.2 07/20/2020       Assessment and Plan    ASSESSMENT PLAN:  1. 70-year-old right-handed -American gentleman with chronic conditions significant for hypertension, hyperlipidemia, benign process hypertrophy, sleep apnea, lumbar stenosis who had chronic low back pain for over 20 years and pain extending down his lower extremities especially right lower extremity with progressive weakness in his right lower extremity over the past year and he says he was noted to have a right foot drop.  He was diagnosed with lumbar spinal stenosis and underwent L3-5 PLDF by Dr. Gregorio Lancaster on 7/6/20 at [facility].  Postoperatively he is allowed weightbearing as tolerated on both lower extremities.  He was recommended a LSO brace for use when out of bed.  He did have urinary frequency postoperatively.  Drains were removed on 7/12/20.  Postoperative course was significant for abdominal pain and he reports multiple imaging studies were done of his abdomen and he also had barium follow-through.  He was seen by surgical team for his abdominal pain.  He reports abdominal x-rays were negative for obstruction.  Amylase, lipase were not elevated.  He says that when he takes Gabapentin he gets abdominal pain  and he noticed that even before his surgery.  We will hold that for now.  Also he will be kept on Protonix/Pepcid.  He is on subcutaneous Heparin for DVT prophylaxis.  He is also on Dilaudid and Flexeril for pain control. He has been kept on bowel medications for his constipation issues.  He still reports ongoing abdominal pain and I mentioned to him that we will get another x-ray of his abdomen tomorrow.  If his abdominal pain gets worse, he may need to go to the ER for evaluation. On 7/10/20, hemoglobin was 10.1, WBC count 8.3, BUN 15, creatinine 0.9.  He has been needing assistance for mobility, transfers, self-care postoperatively. He is transferred to [facility] on 7/14/20 for further rehabilitation care.     2. DVT prophylaxis - on Heparin.  On SCDs.  Platelets 385 on 7/15/2020.     3.  Lumbar stenosis - status post posterior lumbar decompression and fusion, lumbar stenosis, lumbar radiculopathy - continue PT, OT, psychology.  Monitor healing of lumbar incision.  He has chronic right foot drop.     4. GI - On Protonix for GI prophylaxis. On Pepcid.  MiraLAX, Senokot-S.  On PRN Dulcolax.  On PRN Maalox.  On Tums when necessary.  On PRN MiraLAX.  We will get abdominal x-ray tomorrow due to ongoing abdominal pain.Discussed results of abdominal x-ray with him.  He had some left upper quadrant pain today while in therapy and had to return back to room.  Discussed with him we can send him to [facility] for evaluation but he wants to hold off.  He wants to wait another day and see how he feels tomorrow and if he is any worse he will go to the ER for evaluation for further work-up.  Discussed with nurse.  Discussed with patient that if he feels worse in the evening or at nighttime house physician can evaluate him and send him to [facility] for evaluation if needed.He reports abdominal pain is less today.  He does not feel the need to go to the ER.  Progressing in therapy.  He reports decreased abdominal pain  today.  He reports he will take MiraLAX tonight as he has some constipation.     5.  - voiding.  Denies dysuria.  Monitor post void residuals.  He has some urinary frequency.  On Flomax.     6.  Hypertension -  on Cozaar.     7. Pain - on when necessary Dilaudid.  On Flexeril PRN.  On Tylenol PRN.  Gabapentin was discontinued as patient says it causes abdominal pain for him.  He noticed this even prior to his surgery per patient.     8. Hematology -hemoglobin 11.8, WBC 8.99 on 11/15/2020.     9.  Hyperlipidemia - on Crestor.      10. F/E/N - on vitamin D.     11. Rehabilitation medicine - Continue comprehensive rehabilitation care. Continue PT, OT, speech, psychology.  We will follow falls precautions, cardiac precautions, monitor pulse oximetry in therapy and follow aspiration precautions.  We will follow spinal precautions.He reports he still had some abdominal pain today but is decreasing.  Abdominal x-rays were ordered.  Tolerating diet.Discussed results of abdominal x-ray with him.  He had some left upper quadrant pain today while in therapy and had to return back to room.  Discussed with him we can send him to [place] [facility] for evaluation but he wants to hold off.  He wants to wait another day and see how he feels tomorrow and if he is any worse he will go to the ER for evaluation for further work-up.  Discussed with nurse.  Discussed with patient that if he feels worse in the evening or at nighttime house physician can evaluate him and send him to [place] ER for evaluation if needed.He reports abdominal pain is less today.  He does not feel the need to go to the ER.  Progressing in therapy.  He is ambulating up to 150 feet in therapy close supervision to min assist.  Working with occupational therapy.  He reports he will take MiraLAX tonight as he has some constipation.Progressing in therapy.  Decreased abdominal pain per patient.  Able to walk better in therapy. Ambulating well in physical therapy.  He denies  much abdominal pain today.  He indicates he has follow-up appointment at his surgeon's office on Thursday 7/23 at 1:15 PM.  Order written to that effect.  Discussed with nursing.  He says he is hoping to be discharged by next weekend.  Discussed with him he will be team next week.      12. Reviewed labs today.  Mild anemia    Goal is for home by 7/25    Jimmy Adame, DO  7/20/2020

## 2020-07-20 NOTE — PROGRESS NOTES
Patient: Jael Herman  Location: [facility] [address]  MRN: [id number]  Today's date: 7/20/2020    History of Present Illness  Jael Herman is a 70 y.o. male whose primary indication for inpatient rehabilitation is Spinal Cord, Non-Traumatic.  Pertinent History of Current Functional Problem:  He has a history of Spinal stenosis of lumbar region with neurogenic claudication [M48.062] and was admitted for a lumbar spinal fusion. Underwent L3-5 PLDF (posterior lumbar decompression, L4-5 posterior lumbar fusion with instrumentation.  by Dr. Gregorio Lancaster 7/6/20 at [facility]. Post op he had anemia but did not require transfusion. His pain was managed with Tylenol, Neurontin, Flexeril and Dilaudid.  He was put on SQ Heparin for DVT ppx. The patient was seen by PM&R and found to have residual deficits in ambulation and ADLs due to S/P lumbar spinal fusion [Z98.1] and was transferred to [facility] on 7/14/2020 for acute inpatient rehab.      Past Medical History  Jael has a past medical history of BPH (benign prostatic hyperplasia), DJD (degenerative joint disease), Hypertension, Lumbar stenosis, and BEN (obstructive sleep apnea).    OT Vitals    Date/Time Pulse HR Source SpO2 Pt Activity O2 Therapy BP BP Location BP Method Pt Position [facility]   07/20/20 1313 120 Monitor 95 % At rest None (Room air) 110/60 Left upper arm Manual Sitting    07/20/20 1428 120 Monitor -- -- -- 110/55 Left upper arm Manual Sitting       OT Pain    Date/Time Pain Type Pref Pain Scale Orientation Location Rating: Rest Rating: Activity Interventions [facility]   07/20/20 1313 -- -- lower back 4 - moderate pain 4 - moderate pain position adjusted    07/20/20 1428 Pain Reassessment word (verbal rating pain scale) lower back 4 - moderate pain 4 - moderate pain position adjusted           Prior Living Environment      Most Recent Value   Lives With  spouse   Living Arrangements  house   Home Accessibility   stairs to enter home (Group)   Number of Stairs, Main Entrance  4   Surface of Stairs, Main Entrance  concrete   Stair Railings, Main Entrance  railing on left side (ascending)   Landing, Stairs, Main Entrance  railings present   Stairs Comment, Main Entrance  side or front entrance   Location, Kitchen  first (main) floor   Kitchen Access Comment  pt completes IADLs at home   Location, Patient Bedroom  second floor, must negotiate stairs to access   Patient Bedroom Access Comment  has bed on first floor   Location, Bathroom  first (main) floor, second floor, must negotiate stairs to access   Bathroom Access Comment  1st floor powder room, tub shower, wife has shower bench, renovating master bedroom closet into bathroom, standard toilets, no grab bars   Number of Stairs, Within Home, Secondary  other (see comments)   Stairs Comment, Within Home, Secondary  has stair lift for wife          Prior Level of Function      Most Recent Value   Dominant Hand  right   Ambulation  independent   Transferring  independent   Toileting  independent   Bathing  independent   Dressing  independent   Eating  independent   Equipment Currently Used at Home  none          IRF OT Evaluation and Treatment - 07/20/20 1312        Time Calculation    Start Time  1300     Stop Time  1430     Time Calculation (min)  90 min        Session Details    Document Type  daily treatment/progress note     Mode of Treatment  occupational therapy;individual therapy        General Information    General Observations of Patient  pleasant, cooperative; pt. recieved in room        Sit to Stand Transfer    [place], Sit to Stand Transfer  close supervision     Verbal Cues  technique;safety     Assistive Device  walker, front-wheeled     Comment  From W/C & EOB; CLS for safety & balance        Stand to Sit Transfer    [place], Stand to Sit Transfer  close supervision     Verbal Cues  safety;technique     Assistive Device  walker, front-wheeled      Comment  To W/C & EOB; CLS for safety & balance        Safety Issues, Functional Mobility    Comment, Safety Issues/Impairments (Mobility)  OT: functional mobility c RW in pt. room, kitchen environment, therapy gym; Steadying assist for balance        Balance    Comment, Balance  1.) ambulatory level item retireval & transport c RW in kitchen environment (drawers, cabinets, fridge, freezer), participates in task 7 mins x 2 c rest break in between, CLS/ steadying assist. Educated on walker safety during functional activity, demonstrates good immediate carryover, would benefit from additional practice for reinforcement 2.) ambulatory level c RW  floor item retrieval c use of reacher, participates in task 5 mins x 1, Min A for balance        Upper Extremity (Therapeutic Exercise)    Exercise Position/Type (UE Therapeutic Exercise)  seated;resistive exercises     Range of Motion Exercises (Upper Extremity Therapeutic Exercise)  bilateral;shoulder flexion/extension     Weight/Resistance (Upper Extremity Therapeutic Exercise)  4 pounds     Reps and Sets (Upper Extremity Therapeutic Exercise)  3 x 10     Comment (UE Therapeutic Exercise)  B UE performed unilaterally seated in W/C c 4# dumbell: shoulder flexion to 90 degrees, chest press, bicep curls        Aerobic Exercise    Type (Aerobic Exercise)  arm bike     Time Performed (Aerobic Exercise)  7     Comment (Aerobic Exercise)  B UE arm bike performed seated in W/C x 3 trials (3 mins x 1 & 2 mins x 1 forward propulsion, 2 mins x 1 backwards propulsion); minimal resistance, self initiated rest breaks 2* fatigue        Lower Body Dressing    Self-Performance  dons/doffs left shoe;dons/doffs right shoe     Position  edge of bed sitting     Adaptive Equipment  long-handled shoe horn     [place], Footwear  close supervision     Comment  Lo godinez seated EOB at start of session c [facility] for safety & balance        Orthotic Management    Orthosis Location  spinal  orthosis        Spinal Orthosis Management    Type (Spinal Orthosis)  LSO (lumbar sacral orthosis)     Fabrication Comment (Spinal Orthosis)  pt. recieved in bed at start of session, LSO donned prior to transfer OOB, remains donned for duration of treatment session        Daily Progress Summary (OT)    Daily Outcome Statement (OT)  Good participation in treatment session. Completes ambulatory level item retrieval & transport c RW at CLS/Min A level, demonstrating good activity tolerance throughout. Educated on walker safety techniques during funcitonal activity, demonstrates good immediate carryover. Good performance c aerobic & B UE strengthening exercise c aim at increased functional endurance. Continue OT per POC.                             IRF OT Goals      Most Recent Value   Transfer Goal 1   Activity/Assistive Device  toilet at 07/15/2020 0740   [place]  supervision required at 07/15/2020 0740   Time Frame  short-term goal (STG), 1 week at 07/15/2020 0740   Transfer Goal 2   Activity/Assistive Device  toilet at 07/15/2020 0740   [place]  modified independence at 07/15/2020 0740   Time Frame  long-term goal (LTG), 2 weeks at 07/15/2020 0740   Transfer Goal 3   Activity/Assistive Device  shower at 07/15/2020 0740   [place]  set-up required, supervision required at 07/15/2020 0740   Time Frame  short-term goal (STG), 1 week at 07/15/2020 0740   Transfer Goal 4   Activity/Assistive Device  shower at 07/15/2020 0740   [place]  set-up required, supervision required at 07/15/2020 0740   Time Frame  long-term goal (LTG), 2 weeks at 07/15/2020 0740   Bathing Goal 1   [place]  minimum assist (75% or more patient effort) at 07/15/2020 0740   Time Frame  short-term goal (STG), 1 week at 07/15/2020 0740   Bathing Goal 2   [place]  supervision required, set-up required at 07/15/2020 0740   Time Frame  short-term goal (STG), 1 week at 07/15/2020 0740   UB Dressing Goal 1   [place]  set-up  required, supervision required at 07/15/2020 0740   Time Frame  short-term goal (STG), 1 week at 07/15/2020 0740   UB Dressing Goal 2   [place]  modified independence at 07/15/2020 0740   Time Frame  long-term goal (LTG), 2 weeks at 07/15/2020 0740   LB Dressing Goal 1   [place]  moderate assist (50-74% patient effort) at 07/15/2020 0740   Time Frame  short-term goal (STG), 1 week at 07/15/2020 0740   LB Dressing Goal 2   [place]  modified independence at 07/15/2020 0740   Time Frame  long-term goal (LTG), 2 weeks at 07/15/2020 0740   Grooming Goal 1   [place]  supervision required at 07/15/2020 0740   Time Frame  short-term goal (STG), 1 week at 07/15/2020 0740   Strategies/Barriers  in stance at 07/15/2020 0740   Grooming Goal 2   [place]  modified independence at 07/15/2020 0740   Time Frame  long-term goal (LTG), 2 weeks at 07/15/2020 0740   Strategies/Barriers  in stance at 07/15/2020 0740   Toileting Goal 1   [place]  minimum assist (75% or more patient effort) at 07/15/2020 0740   Time Frame  short-term goal (STG), 1 week at 07/15/2020 0740   Toileting Goal 2   [place]  modified independence at 07/15/2020 0740   Time Frame  long-term goal (LTG), 2 weeks at 07/15/2020 0740

## 2020-07-20 NOTE — PLAN OF CARE
Problem: Rehabilitation (IRF) Plan of Care  Goal: Plan of Care Review  Outcome: Progressing  Flowsheets (Taken 7/20/2020 1227)  Plan of Care Reviewed With: patient  IRF Plan of Care Review: progress ongoing, continue  Outcome Summary: Meds reviewed with pt

## 2020-07-20 NOTE — PLAN OF CARE
Problem: Rehabilitation (IRF) Plan of Care  Goal: Plan of Care Review  Flowsheets (Taken 7/20/2020 1104)  Plan of Care Reviewed With: patient  IRF Plan of Care Review: progress ongoing, continue  Outcome Summary: Good participation in treatment session. Completes ambulatory level item retrieval & transport c RW at CLS/Min A level, demonstrating good activity tolerance throughout. Educated on walker safety techniques during funcitonal activity, demonstrates good immediate carryover. Good performance c aerobic & B UE strengthening exercise c aim at increased functional endurance. Continue OT per POC.

## 2020-07-20 NOTE — PROGRESS NOTES
Patient: Jael Herman  Location: [facility] [address]  MRN: [id number]  Today's date: 7/20/2020    History of Present Illness  Jael Herman is a 70 y.o. male whose primary indication for inpatient rehabilitation is Spinal Cord, Non-Traumatic.  Pertinent History of Current Functional Problem:  He has a history of Spinal stenosis of lumbar region with neurogenic claudication [M48.062] and was admitted for a lumbar spinal fusion. Underwent L3-5 PLDF (posterior lumbar decompression, L4-5 posterior lumbar fusion with instrumentation.  by Dr. Gregorio Lancaster 7/6/20 at [facility]. Post op he had anemia but did not require transfusion. His pain was managed with Tylenol, Neurontin, Flexeril and Dilaudid.  He was put on SQ Heparin for DVT ppx. The patient was seen by PM&R and found to have residual deficits in ambulation and ADLs due to S/P lumbar spinal fusion [Z98.1] and was transferred to [facility] on 7/14/2020 for acute inpatient rehab.      Past Medical History  Jael has a past medical history of BPH (benign prostatic hyperplasia), DJD (degenerative joint disease), Hypertension, Lumbar stenosis, and BEN (obstructive sleep apnea).    PT Vitals    Date/Time Pulse HR Source SpO2 Pt Activity O2 Therapy BP BP Location BP Method Pt Position Observations [facility]   07/20/20 0901 117 Monitor -- -- -- 114/69 Left upper arm Automatic Sitting --    07/20/20 0929 130 Monitor -- -- -- 104/76 Left upper arm Manual Sitting post seated LE therex,  with ambulation    07/20/20 1027 124 Monitor 97 % At rest None (Room air) 120/72 Left upper arm Automatic Sitting -- BG      PT Pain    Date/Time Pain Type Pref Pain Scale Orientation Location Rating: Rest Interventions [facility]   07/20/20 0901 Pain Assessment number (Numeric Rating Pain Scale) lower back 4 diversional activity provided;care clustered    07/20/20 0929 Post Activity -- -- -- -- --    07/20/20 1027 Pain Reassessment -- lower  back 5 position adjusted BG          Prior Living Environment      Most Recent Value   Lives With  spouse   Living Arrangements  house   Home Accessibility  stairs to enter home (Group)   Number of Stairs, Main Entrance  4   Surface of Stairs, Main Entrance  concrete   Stair Railings, Main Entrance  railing on left side (ascending)   Landing, Stairs, Main Entrance  railings present   Stairs Comment, Main Entrance  side or front entrance   Location, Kitchen  first (main) floor   Kitchen Access Comment  pt completes IADLs at home   Location, Patient Bedroom  second floor, must negotiate stairs to access   Patient Bedroom Access Comment  has bed on first floor   Location, Bathroom  first (main) floor, second floor, must negotiate stairs to access   Bathroom Access Comment  1st floor powder room, tub shower, wife has shower bench, renovating master bedroom closet into bathroom, standard toilets, no grab bars   Number of Stairs, Within Home, Secondary  other (see comments)   Stairs Comment, Within Home, Secondary  has stair lift for wife          Prior Level of Function      Most Recent Value   Dominant Hand  right   Ambulation  independent   Transferring  independent   Toileting  independent   Bathing  independent   Dressing  independent   Eating  independent   Equipment Currently Used at Home  none          IRF PT Evaluation and Treatment - 07/20/20 0901        Time Calculation    Start Time  0900     Stop Time  1030     Time Calculation (min)  90 min        Session Details    Document Type  daily treatment/progress note     Mode of Treatment  physical therapy;individual therapy        General Information    Patient Profile Reviewed?  yes     General Observations of Patient  pt received OOB in WC without complaints     Existing Precautions/Restrictions  cardiac;fall;spinal;brace worn when out of bed        Bed Mobility    [place], Supine to Sit  close supervision     [place], Sit to Supine  close supervision   "   Assistive Device (Bed Mobility)  none     Comment (Bed Mobility)  Pt reported most pain when getting in/out of bed. Instructed pt to complete log roll technique with VCs for sequencing. Performed 2 trials with Cl S.        Sit to Stand Transfer    [place], Sit to Stand Transfer  close supervision     Verbal Cues  safety     Assistive Device  walker, front-wheeled     Comment  from WC to RW, R UE push to stand with LUE on RW        Stand to Sit Transfer    [place], Stand to Sit Transfer  close supervision     Verbal Cues  safety     Assistive Device  walker, front-wheeled     Comment  RW to WC, Cl S for safety        Stand Pivot Transfer    [place], Stand Pivot/Stand Step Transfer  close supervision     Verbal Cues  safety     Assistive Device  walker, front-wheeled     Comment  ambulatory approach, VCs for close proximity to RW        Car Transfer    Transfer Technique  stand pivot     [place], Car Transfer  close supervision     Verbal Cues  hand placement;safety     Assistive Device  walker, front-wheeled     Comment  ambulatory SPT, VCs for hand placement as needed        Gait Training    [place], Gait  touching/steadying assist     Assistive Device  walker, front-wheeled     Distance in Feet  150 feet     Gait Pattern Utilized  step-through     Bilateral Gait Deviations  heel strike decreased     Right Sided Gait Deviations  heel strike decreased     Comment  steadying A for safety;  with ambulation        Curb Negotiation (Mobility)    [place]  touching/steadying assist     Comment  up/down 6\" + 2\" curb step using RW, steadying A for safety        Sloped Surface Gait Skills (Mobility)    [place]  touching/steadying assist     Comment  up/down 5' indoor ramp using RW, steayding A for safety        Stairs Training    [place], Stairs  touching/steadying assist     Assistive Device  railing     Handrail Location  both sides     Number of Stairs  8     Stair Height  6 " inches     Ascending Stairs Technique  step-over-step;step-to-step     Descending Stairs Technique  step-over-step;step-to-step     Comment  4 stairs using B HR; 4 stairs using L HR , lateral approach. steadying assist for safety. HR: 150         Lower Extremity (Therapeutic Exercise)    Exercise Position/Type (LE Therapeutic Exercise)  seated;AROM (active range of motion);standing     General Exercise (LE Therapeutic Exercise)  bilateral     Reps and Sets (LE Therapeutic Exercise)  x20-30 reps     Comment (LE Therapeutic Exercise)  SEATED warm up: AP, LAQ, alternating marches  STANDING: BUE supported on anterior // bar, heel raises and hip abduction        Daily Progress Summary (PT)    Daily Outcome Statement (PT)  Pt with increased HR throughout session, 117-150 bpm. RN notified. Prolonged seated rest beaks taken to accomodate. Instructed pt to complete log roll technique to alleviate back pain when getting in/out of bed. Please allow pt increased time to complete and provide VCs for sequencing as needed.     Recommendations  cont to progress LE strengthening, balance training, and mobility                            IRF PT Goals      Most Recent Value   Bed Mobility Goal 1   Activity/Assistive Device  bed mobility activities, all at 07/15/2020 1035   [place]  supervision required, verbal cues required at 07/15/2020 1035   Time Frame  short-term goal (STG), 5 - 7 days at 07/15/2020 1035   Bed Mobility Goal 2   Activity/Assistive Device  bed mobility activities, all at 07/15/2020 1035   [place]  modified independence at 07/15/2020 1035   Time Frame  long-term goal (LTG), 14 days or less at 07/15/2020 1035   Transfer Goal 1   Activity/Assistive Device  sit-to-stand/stand-to-sit, bed-to-chair/chair-to-bed, stand pivot, car transfer at 07/15/2020 1035   [place]  supervision required, verbal cues required at 07/15/2020 1035   Time Frame  short-term goal (STG), 5 - 7 days at 07/15/2020 1035   Transfer  Goal 2   Activity/Assistive Device  sit-to-stand/stand-to-sit, bed-to-chair/chair-to-bed, stand pivot, car transfer at 07/15/2020 1035   [place]  modified independence at 07/15/2020 1035   Time Frame  long-term goal (LTG), 14 days or less at 07/15/2020 1035   Gait/Walking Locomotion Goal 1   Activity/Assistive Device  gait (walking locomotion), diminish gait deviation, increase endurance/gait distance, improve balance and speed at 07/15/2020 1035   Distance  150 feet at 07/15/2020 1035   [place]  supervision required, verbal cues required at 07/15/2020 1035   Time Frame  short-term goal (STG), 5 - 7 days at 07/15/2020 1035   Gait/Walking Locomotion Goal 2   Activity/Assistive Device  gait (walking locomotion), diminish gait deviation, increase endurance/gait distance, improve balance and speed at 07/15/2020 1035   Distance  250 feet at 07/15/2020 1035   [place]  modified independence at 07/15/2020 1035   Time Frame  long-term goal (LTG), 14 days or less at 07/15/2020 1035   Stairs Goal 1   Activity/Assistive Device  stairs, all skills, using handrail, left, using handrail, right at 07/15/2020 1035   Number of Stairs  12 at 07/15/2020 1035   [place]  supervision required, verbal cues required at 07/15/2020 1035   Time Frame  short-term goal (STG), 5 - 7 days at 07/15/2020 1035   Stairs Goal 2   Activity/Assistive Device  stairs, all skills [using 1 railing.] at 07/15/2020 1035   Number of Stairs  12 at 07/15/2020 1035   [place]  modified independence at 07/15/2020 1035   Time Frame  long-term goal (LTG), 14 days or less at 07/15/2020 1035   Problem Specific Goal 1   Problem-Specific Goal 1  Pt. will demonstrate an improvement in standing balance as evidenced by a Burrell Balance Test score of > than or = to 32/56. at 07/15/2020 1035   Time Frame  short-term goal (STG), 5 - 7 days at 07/15/2020 1035   Problem Specific Goal 2   Problem-Specific Goal 2  Pt. will demonstrate an improvement in  standing balance as evidenced by a Burrell Balance Test score of > than or = to 41/56, indicating a low falls risk. at 07/15/2020 1035   Time Frame  long-term goal (LTG), 14 days or less at 07/15/2020 1035

## 2020-07-20 NOTE — PLAN OF CARE
Problem: Rehabilitation (IRF) Plan of Care  Goal: Plan of Care Review  Outcome: Progressing  Flowsheets (Taken 7/20/2020 0358)  IRF Plan of Care Review: progress ongoing, continue  Outcome Summary: slept well

## 2020-07-20 NOTE — PROGRESS NOTES
[facility] Progress Note          Patient was seen and examined.   Attestation Notes: Face to face encounter completed    Jael Herman is a 70 y.o. male who was admitted for S/P lumbar spinal fusion [Z98.1]. Patient was referred by Anoop Yeh MD for medical assessment and management      CC: S/P lumbar spinal fusion [Z98.1]     HPI: Jael Herman is a 70 y.o. male with HTN, HL, BPH, BEN (no CPAP), and lumbar stenosis who underwent L3-5 PLDF by Dr. Gregorio Lancaster 7/6/20 at [facility]. Post op he had anemia but did not require transfusion. His pain was managed with Tylenol, Neurontin, Flexeril and Dilaudid.  He was put on SQ Heparin for DVT ppx.      He remained on Losartan for HTN and Crestor for HL. He was also on Flomax for BPH.      He had some post op abdominal discomfort and nausea. Xrays negative for obstruction. Amylase and lipase were not elevated. He was treated with Senokot, Colace, Miralax and Dulcolax suppositories for constipation.      The patient was seen by PM&R and found to have residual deficits in ambulation and ADLs due to S/P lumbar spinal fusion [Z98.1] and came to [facility] on 7/14/2020 for acute inpatient rehab.     He participated in therapy.     SUBJECTIVE:  Patient interviewed and examined     Noted to be more tachycardic today, no chest pain, palpitations or dyspnea.     He still complains of LLQ pain but better, no nausea     Back pain remains stable, no new weakness or numbness, no dysuria, no chest pain, palpitations, dyspnea or fevers    Review of Systems:  All other systems reviewed and negative except as noted in the HPI.    Current meds and allergies reviewed    Past Medical History:   Diagnosis Date   • BPH (benign prostatic hyperplasia)    • DJD (degenerative joint disease)    • Hypertension    • Lumbar stenosis    • BEN (obstructive sleep apnea)      Past Surgical History:   Procedure Laterality Date   • POSTERIOR FUSION  LUMBAR SPINE  07/06/2020    L3-5 PLDF     Social History     Tobacco Use   • Smoking status: Never Smoker   • Smokeless tobacco: Never Used   Substance Use Topics   • Alcohol use: Not Currently   • Drug use: Never      History reviewed. No pertinent family history.    Vital signs in last 24 hours:  Temp:  [36.7 °C (98 °F)-37.1 °C (98.8 °F)] 37.1 °C (98.8 °F)  Heart Rate:  [] 119  Resp:  [18] 18  BP: ()/(55-79) 90/60  Vital signs reviewed 07/20/20 4:50 PM    Physical Exam   Constitutional: He is oriented to person, place, and time. He appears well-developed and well-nourished.   HENT:   Head: Normocephalic and atraumatic.   Right Ear: External ear normal.   Left Ear: External ear normal.   Nose: Nose normal.   Mouth/Throat: Oropharynx is clear and moist.   Eyes: Pupils are equal, round, and reactive to light. Conjunctivae and EOM are normal.   Neck: Normal range of motion. Neck supple.   Cardiovascular: Normal rate, regular rhythm, normal heart sounds and intact distal pulses. Exam reveals no gallop and no friction rub.   No murmur heard.  Pulmonary/Chest: Effort normal and breath sounds normal. No stridor. No respiratory distress. He has no wheezes. He has no rales.   Abdominal: Soft. Bowel sounds are normal. He exhibits no distension. There is no tenderness.   Musculoskeletal: He exhibits edema and deformity (s/p lumbar fusion).   Neurological: He is alert and oriented to person, place, and time.   Skin: Skin is warm and dry.   Psychiatric: He has a normal mood and affect. His behavior is normal.   Nursing note and vitals reviewed.              Objective    Labs:  I have reviewed the patient's labs.  Significant abnormals are anemia.  Results from last 7 days   Lab Units 07/20/20  0705   WBC K/uL 7.48   HEMOGLOBIN g/dL 12.3*   HEMATOCRIT % 37.9*   PLATELETS K/uL 325     Results from last 7 days   Lab Units 07/20/20  0705   SODIUM mEQ/L 137   POTASSIUM mEQ/L 4.7   CHLORIDE mEQ/L 102   CO2 mEQ/L 25   BUN  mg/dL 20   CREATININE mg/dL 1.2   CALCIUM mg/dL 9.6   ALBUMIN g/dL 3.8   BILIRUBIN TOTAL mg/dL 1.1   ALK PHOS IU/L 64   ALT IU/L 22   AST IU/L 17   GLUCOSE mg/dL 110*     Results from last 7 days   Lab Units 07/15/20  0527   AMYLASE U/L 112*     Lab Results   Component Value Date    LIPASE 34 07/15/2020     Lab Results   Component Value Date    EQIAIFFO17 112 (L) 07/15/2020       Imagin/15/20: obstruction series:  IMPRESSION:   1.  No acute cardiopulmonary disease.  2.  Possible mild ileus.      ASSESSMENT/PLAN:    70 y.o. male  with HTN, HL, BPH, BEN (no CPAP), and lumbar stenosis who underwent L3-5 PLDF by Dr. Gregorio Lancaster 20 at [facility]     1. Neuro:  -lumbar stenosis s/p L3-5 PLDF  -Tylenol and Dilaudid prn pain  -has follow up with Dr. Gregorio Lancaster 20     2. Vasc:  -bilat LE edema  -SCDs and SQ Heparin for DVT ppx  -doppler US bilat LE's neg. 7/15/20     3. Heme:  -anemia due to blood loss, no iron due to GI distress  -B12 low, added oral B12  -follow CBC     4. Renal:  -increased risk of dehydration and electrolyte changes  -follow BMP, Mg     5. Gi:  -abdominal pain since surgery, repeated xrays without obstruction, seen by general surgery  -7/15/20 obstruction series with mild ileus  -7/15/20 amylase slightly elevated and lipase normal, not likely cause of discomfort  -Senokot-S and Miralax for bowels  -Protonix and Pepcid for GERD and ulcer ppx  -may need CT abd/pelvis if pain persists     6. Gu:  -BPH on Flomax  -no UTI or retention     7. Cardiac:  -tachycardia due to therapy, if persists, check ECG  -HTN on Losartan  -watch for orthostatic hypotension  -use cardiac precautions in therapy     8. Pulm:  -BEN but no CPAP  -incentive spirometry for atelectasis     9. Derm:  -seen by [facility] [facility] for skin assessment     10. Nutrition:  -seen by Nutrition for assessment and education     11. Endo:  -HL on Crestor     12. Psych:  -seen by Psychology for support     plan  discussed with patient, nurse, case management and Anoop Yeh MD Scott Sapperstein, MD  7/20/2020  4:50 PM

## 2020-07-21 ENCOUNTER — APPOINTMENT (INPATIENT)
Dept: OCCUPATIONAL THERAPY | Facility: REHABILITATION | Age: 70
DRG: 560 | End: 2020-07-21
Payer: MEDICARE

## 2020-07-21 ENCOUNTER — APPOINTMENT (INPATIENT)
Dept: OTHER | Facility: REHABILITATION | Age: 70
DRG: 560 | End: 2020-07-21
Payer: MEDICARE

## 2020-07-21 ENCOUNTER — APPOINTMENT (INPATIENT)
Dept: PHYSICAL THERAPY | Facility: REHABILITATION | Age: 70
DRG: 560 | End: 2020-07-21
Payer: MEDICARE

## 2020-07-21 PROCEDURE — 97799 UNLISTED PHYSCL MED/REHAB PX: CPT

## 2020-07-21 PROCEDURE — 93005 ELECTROCARDIOGRAM TRACING: CPT | Performed by: HOSPITALIST

## 2020-07-21 PROCEDURE — 63600000 HC DRUGS/DETAIL CODE: Performed by: HOSPITALIST

## 2020-07-21 PROCEDURE — 97530 THERAPEUTIC ACTIVITIES: CPT | Mod: GP

## 2020-07-21 PROCEDURE — 97110 THERAPEUTIC EXERCISES: CPT | Mod: GO

## 2020-07-21 PROCEDURE — 97116 GAIT TRAINING THERAPY: CPT | Mod: GP

## 2020-07-21 PROCEDURE — 63700000 HC SELF-ADMINISTRABLE DRUG: Performed by: PHYSICAL MEDICINE & REHABILITATION

## 2020-07-21 PROCEDURE — 63700000 HC SELF-ADMINISTRABLE DRUG: Performed by: HOSPITALIST

## 2020-07-21 PROCEDURE — 97535 SELF CARE MNGMENT TRAINING: CPT | Mod: GO

## 2020-07-21 PROCEDURE — 12800001 HC ROOM AND CARE SEMIPRIVATE REHAB-BRAIN INJ

## 2020-07-21 RX ORDER — SUCRALFATE 1 G/1
1 TABLET ORAL EVERY 6 HOURS
Status: DISCONTINUED | OUTPATIENT
Start: 2020-07-21 | End: 2020-07-24 | Stop reason: HOSPADM

## 2020-07-21 RX ADMIN — POLYETHYLENE GLYCOL 3350 17 G: 17 POWDER, FOR SOLUTION ORAL at 08:33

## 2020-07-21 RX ADMIN — ALUMINUM HYDROXIDE, MAGNESIUM HYDROXIDE, AND SIMETHICONE 30 ML: 200; 200; 20 SUSPENSION ORAL at 02:17

## 2020-07-21 RX ADMIN — SUCRALFATE 1 G: 1 TABLET ORAL at 23:48

## 2020-07-21 RX ADMIN — ROSUVASTATIN CALCIUM 5 MG: 5 TABLET, FILM COATED ORAL at 08:31

## 2020-07-21 RX ADMIN — SUCRALFATE 1 G: 1 TABLET ORAL at 17:33

## 2020-07-21 RX ADMIN — SENNOSIDES AND DOCUSATE SODIUM 1 TABLET: 8.6; 5 TABLET ORAL at 21:17

## 2020-07-21 RX ADMIN — HEPARIN SODIUM 5000 UNITS: 5000 INJECTION INTRAVENOUS; SUBCUTANEOUS at 21:17

## 2020-07-21 RX ADMIN — FAMOTIDINE 20 MG: 20 TABLET ORAL at 21:16

## 2020-07-21 RX ADMIN — CYANOCOBALAMIN TAB 1000 MCG 1000 MCG: 1000 TAB at 08:31

## 2020-07-21 RX ADMIN — HEPARIN SODIUM 5000 UNITS: 5000 INJECTION INTRAVENOUS; SUBCUTANEOUS at 05:37

## 2020-07-21 RX ADMIN — HEPARIN SODIUM 5000 UNITS: 5000 INJECTION INTRAVENOUS; SUBCUTANEOUS at 13:11

## 2020-07-21 RX ADMIN — FAMOTIDINE 20 MG: 20 TABLET ORAL at 08:32

## 2020-07-21 RX ADMIN — MELATONIN 1000 UNITS: at 08:31

## 2020-07-21 RX ADMIN — TAMSULOSIN HYDROCHLORIDE 0.4 MG: 0.4 CAPSULE ORAL at 21:16

## 2020-07-21 RX ADMIN — PANTOPRAZOLE SODIUM 40 MG: 40 TABLET, DELAYED RELEASE ORAL at 08:31

## 2020-07-21 RX ADMIN — SENNOSIDES AND DOCUSATE SODIUM 1 TABLET: 8.6; 5 TABLET ORAL at 08:32

## 2020-07-21 RX ADMIN — SUCRALFATE 1 G: 1 TABLET ORAL at 13:11

## 2020-07-21 NOTE — PATIENT CARE CONFERENCE
Inpatient Rehabilitation Team Conference Note  Date: 7/21/2020  Time: 10:43 AM       Patient Name: Jael Herman     Medical Record Number: [id number]   YOB: 1950  Sex: Male      Room/Bed: 117/117D  Payor Info: Payor: MEDICARE / Plan: MEDICARE PART A & B / Product Type: Medicare /     Admitting Diagnosis: S/P lumbar spinal fusion [Z98.1]   Admit Date/Time: 7/14/2020  5:13 PM  Admission Comments: No comment available     Primary Diagnosis: No Principal Problem: There is no principal problem currently on the Problem List. Please update the Problem List and refresh.  Principal Problem: No Principal Problem: There is no principal problem currently on the Problem List. Please update the Problem List and refresh.    Patient Active Problem List    Diagnosis Date Noted   • S/P lumbar spinal fusion 07/14/2020   • Benign prostatic hyperplasia with lower urinary tract symptoms 07/14/2020   • Spinal stenosis 07/09/2020   • HTN (hypertension) 07/09/2020   • DJD (degenerative joint disease) 07/09/2020   • DDD (degenerative disc disease), cervical 07/09/2020   • BEN (obstructive sleep apnea) 07/09/2020       Premorbid Status:  Dominant Hand: right  Ambulation: independent  Transferring: independent  Toileting: independent  Bathing: independent  Dressing: independent  Eating: independent  Communication: understands/communicates without difficulty  Swallowing: swallows foods/liquids without difficulty  Baseline Diet/Method of Nutritional Intake: thin liquids;regular solids  Equipment Currently Used at Home: none  Prior Level of Function Comment: ambindep has h/o drop foot on the rle       Current Functional Status:  Mobility  Gait  [place], Gait: touching/steadying assist  Assistive Device: walker, front-wheeled  Comment: steadying A for safety;  with ambulation    Stairs  [place], Stairs: touching/steadying assist  Assistive Device: railing  Handrail Location: both sides  Number of Stairs:  8  Stair Height: 6 inches  Comment: 4 stairs using B HR; 4 stairs using L HR , lateral approach. steadying assist for safety. HR: 150     Wheelchair  Comment, Functional Mobility: Unable to assess due to pt. being in a tilt-in-space w/c.    Transfers  [place], Roll Left: close supervision  Verbal Cues (Roll Left): maintaining precautions;preparatory posture;hand placement  [place], Roll Right: close supervision;verbal cues  Verbal Cues (Roll Right): maintaining precautions;preparatory posture;hand placement  [place], Scoot/Bridge: minimum assist (75% or more patient effort);verbal cues;nonverbal cues (demo/gesture)  Verbal Cues (Scoot/Bridge): maintaining precautions;preparatory posture;hand placement  [place], Supine to Sit: close supervision  Verbal Cues (Supine to Sit): technique;safety  [place], Sit to Supine: close supervision  Verbal Cues (Sit to Supine): safety;technique  [place], Sidelying to Sit to Sidelying: minimum assist (75% or more patient effort);verbal cues;nonverbal cues (demo/gesture)  Verbal Cues (Sidelying to Sit to Sidelying): maintaining precautions;preparatory posture;hand placement  Assistive Device (Bed Mobility): none  Comment (Bed Mobility): Pt reported most pain when getting in/out of bed. Instructed pt to complete log roll technique with VCs for sequencing. Performed 2 trials with Cl S.  Maintains Weight-Bearing Status (Transfers): able to maintain weight-bearing status  Comment: with RW  [place], Bed to Chair: verbal cues;nonverbal cues (demo/gesture);minimum assist (75% or more patient effort)  Verbal Cues: hand placement;maintaining precautions;safety  Assistive Device: walker, front-wheeled  Comment: unable to assess w/o introduction of RWalker.  [place], Chair to Bed: minimum assist (75% or more patient effort);verbal cues;nonverbal cues (demo/gesture)  Verbal Cues: hand placement;maintaining precautions;safety  Assistive Device: walker,  front-wheeled  Comment: unable to assess w/o introduction of RWalker.  [place], Sit to Stand Transfer: supervision  Verbal Cues: safety  Assistive Device: walker, front-wheeled  Comment: From W/C & EOB; CLS for safety & balance  [place], Stand to Sit Transfer: supervision  Verbal Cues: safety  Assistive Device: walker, front-wheeled  Comment: To W/C & EOB; CLS for safety & balance  [place], Stand Pivot/Stand Step Transfer: supervision  Verbal Cues: safety  Assistive Device: walker, front-wheeled  Comment: ambulatory approach, VCs for close proximity to RW    Transfer Technique: stand pivot  [place], Toilet Transfer: close supervision  Verbal Cues: hand placement;safety;technique  Assistive Device: walker, front-wheeled  Comment: per nursing report, requires S for toilet transfer  Transfer Technique: stand pivot  [place], Shower Transfer: close supervision  Assistive Device: grab bars/tub rail;shower chair  Comment: per nursing report, required Cl S for shower transfers  Transfer Technique: sit and swing  [place], Tub Transfer: close supervision;verbal cues  Verbal Cues: hand placement;maintaining precautions;preparatory posture;proper use of assistive device;safety;technique  Assistive Device: walker, front-wheeled;tub bench  Comment: recommending sit and swing technique for tub transfer. Pt able to complete with cueing for sequencing      Self Care  Self-Performance: obtains clothes;don;threads left arm, bra/undershirt;threads right arm, bra/undershirt;pulls bra/undershirt over head/around back;pulls bra/undershirt down/adjusts;threads left arm, shirt;threads right arm, shirt;pulls shirt over head/around back;pulls shirt down/adjusts;orthosis application  [place] Assistance: orthosis application  Adaptive Equipment: none  Comment: clothing retireval with RW with S  Self-Performance: obtains clothes;don;doff;threads left leg, underpants;threads right leg, underpants;pulls underpants up  or down;threads left leg, pants/shorts;threads right leg, pants/shorts;pulls pants/shorts up or down;dons/doffs left sock;dons/doffs right sock;dons/doffs left shoe;dons/doffs right shoe;shoelaces, left;shoelaces, right  [place] Assistance: obtains clothes  Adaptive Equipment: long-handled shoe horn;reacher  [place]: supervision  Comment: figure 4 positioning to thread legs through pants and to don socks/shoes. therapist retrieved socks/shoes  Self-Performance: chest;left arm;right arm;abdomen;front perineal area;buttocks;left upper leg;right upper leg;left lower leg, including foot;right lower leg, including foot  Adaptive Equipment: grab bar/tub rail;hand-held shower spray hose;long-handled sponge;shower chair  Setup Assistance: adaptive equipment setup;adjust water temperature/flow;obtain supplies;open containers  Comment: per nursing report, pt required CL S for bathing. Pt used figure 4 positioning to wash/dry BLEs  [place]: close supervision;adjust/manage clothing;perform bladder hygiene  Adaptive Equipment: grab bar/safety frame  Setup Assistance: adaptive equipment setup  Comment: Pt. continent of bowel at end of session, alerting pt. to utilize call ball when finished for nursing to come A c transfer  Self-Performance: washes, rinses and dries face;brushes/pressley hair;oral care (brushing teeth, cleaning dentures)  Adaptive Equipment: none  Setup Assistance: obtain supplies  [place]: set up  Comment: pt request to sit for grooming tasks to maintain spinal precautions  Comment: I with breakfast    Cognition  Comment, Cognition: Oriented to date [facility] 15/15    Communication  Comment, Motor Speech Assessment: wnl  Comment, Assessment (Auditory Comprehension): wnl      Frequency of Treatment (OT): 60-90 minutes per day, 5-7 times per week  Frequency of Treatment (PT): 60-90 minutes per day, 5-7 times per week    Weekly Outcome Summaries:  Weekly Progress Summary (PT)  Progress Toward Functional Goals  (PT): progressing toward functional goals as expected  Weekly Outcome Statement: Pt has progressed to Cl S for bed mobility and transfers. Requires steadying assist for ambulation using a RW and elevations. Continues to be limited by LBP, impaired balance, and decreased LE strength/ROM. Plan to progress stair training using a single HR to mimic home setup, improve pt's overall dynamic balance, and increase independence with functional mobility.  Barriers to Overall Progress (PT): LBP, impaired balance, decreased LE strength  Impairments Continuing to Limit Function: decreased ROM, decreased strength, impaired balance, impaired coordination, pain  Recommendations (PT): Pt would cont to benefit from skilled IPPT services for 60-90 mins/day, 5-7 days/week to maxmize his safety and independence with functional mobility.  Weekly Progress Summary (OT)  Progress Toward Functional Goals (OT): progressing toward functional goals as expected  Weekly Outcome Statement: Pt requires supervision/close supervision for all ADLs and transfers at this time. Pt requires cueing for use of AE to increase independence. Pt demo good carryover of spinal precautions  Impairments Continuing to Limit Function: decreased flexibility, decreased ROM, decreased strength, impaired balance, pain  Recommendations (OT): continue with IP OT services to increase independence and safety with ADLs, IADLs, and transfers  Weekly Outcome Summary (Nursing)  Weekly Progress Summary: progressing toward goals as expected  Weekly Outcome Statement: A&Ox3. Refuses pain medication. Poor appetite. Takes adequate fluids. Incision AGUSTO with sutures. Heparin for DVT prophylaxis. Needs encouragement to sit in chair. F/U with surgeon on 7/23 for suture removal.  Weekly Outcome Summary (Interdisciplinary)  Weekly Progress Summary: progressing toward goals as expected  Weekly Outcome Statement: Mr. Herman is a 70-year-old man who is status post L3-5 PLDF.  He reports  he developed GI symptoms and less in length and his acute hospital stay.  He reports frustration over these symptoms and is concerned now interfering his recovery.  He reports he does feel somewhat better since his transfer to [facility].  He is very fatigued during the interview but displays a full range of affect and provides an accurate history.  He endorses adjustment symptoms including depression and anxiety and frustration.  He felt his initial therapy performances were limited because of his GI distress.  He reports some concern about being apart from his wife who is a history of transverse myelitis and has been inpatient at the rehab.  He reports he feels increasing pressure to get well because of his situation.  He does report his wife will encourage him to stay at [facility] and follow the plan recommended by the staff.  He is hopeful his GI symptoms will resolve and he does report that he feels that listening to by the physicians and this is helping him.  Cognition appears grossly intact on screening.  Recall was 2 out of 3 but cues helped and is felt fatigue interfered with these tasks.  Psychology to follow for support during this inpatien    Problem Resolution:  Coping/Stress/Tolerance  Major Change/Loss/Stressor/Fears: denies  Techniques to [place] with Loss/Stress/Change: exercise  Coping/Stress  Techniques to [place] with Loss/Stress/Change: exerciseBladder Management: toileting schedule based on voiding pattern  Strategies/Techniques (Bowel Management): bowel agents for management  Counseling (Coping Strategies): active listening utilized   Identified Problems & Impairments: (not recorded)  Comment, Identified Problems & Impairments: (not recorded)  Resolved Problems & Impairments: (not recorded)  Comment, Resolved Problems & Impairments: (not recorded) Team Weekly Outcome Statement: poor appet, HEP, brace out of bed, cont bowel/bladder, urinal management, call bell approp, possible bracing,   que for equipment management,  follow spinal percautions,  (07/21/20 1033)Barriers to Discharge: inadequate family support, no caregiver available after discharge       Goals/Support System:  Patient/Family Goals  Patient's Goals For Discharge: return home, take care of myself at home, return to all previous roles/activities(and manage stairs by himself.)  Family/Support System  Health Advocacy: self-advocacy for health    IRF PT Goals      Most Recent Value   Bed Mobility Goal 1   Activity/Assistive Device  bed mobility activities, all at 07/15/2020 1035   [place]  supervision required, verbal cues required at 07/15/2020 1035   Time Frame  short-term goal (STG), 5 - 7 days at 07/15/2020 1035   Progress/Outcome  goal ongoing at 07/21/2020 0821   Bed Mobility Goal 2   Activity/Assistive Device  bed mobility activities, all at 07/15/2020 1035   [place]  modified independence at 07/15/2020 1035   Time Frame  long-term goal (LTG), 14 days or less at 07/15/2020 1035   Progress/Outcome  goal ongoing at 07/21/2020 0821   Transfer Goal 1   Activity/Assistive Device  sit-to-stand/stand-to-sit, bed-to-chair/chair-to-bed, stand pivot, car transfer at 07/15/2020 1035   [place]  supervision required, verbal cues required at 07/15/2020 1035   Time Frame  short-term goal (STG), 5 - 7 days at 07/15/2020 1035   Progress/Outcome  goal ongoing at 07/21/2020 0821   Transfer Goal 2   Activity/Assistive Device  sit-to-stand/stand-to-sit, bed-to-chair/chair-to-bed, stand pivot, car transfer at 07/15/2020 1035   [place]  modified independence at 07/15/2020 1035   Time Frame  long-term goal (LTG), 14 days or less at 07/15/2020 1035   Progress/Outcome  goal ongoing at 07/21/2020 0821   Gait/Walking Locomotion Goal 1   Activity/Assistive Device  gait (walking locomotion), diminish gait deviation, increase endurance/gait distance, improve balance and speed at 07/15/2020 1035   Distance  150 feet at 07/15/2020 1035    [place]  supervision required, verbal cues required at 07/15/2020 1035   Time Frame  short-term goal (STG), 5 - 7 days at 07/15/2020 1035   Progress/Outcome  goal ongoing at 07/21/2020 0821   Gait/Walking Locomotion Goal 2   Activity/Assistive Device  gait (walking locomotion), diminish gait deviation, increase endurance/gait distance, improve balance and speed at 07/15/2020 1035   Distance  250 feet at 07/15/2020 1035   [place]  modified independence at 07/15/2020 1035   Time Frame  long-term goal (LTG), 14 days or less at 07/15/2020 1035   Progress/Outcome  goal ongoing at 07/21/2020 0821   Stairs Goal 1   Activity/Assistive Device  stairs, all skills, using handrail, left, using handrail, right at 07/15/2020 1035   Number of Stairs  12 at 07/15/2020 1035   [place]  supervision required, verbal cues required at 07/15/2020 1035   Time Frame  short-term goal (STG), 5 - 7 days at 07/15/2020 1035   Progress/Outcome  goal ongoing at 07/21/2020 0821   Stairs Goal 2   Activity/Assistive Device  stairs, all skills [using 1 railing.] at 07/15/2020 1035   Number of Stairs  12 at 07/15/2020 1035   [place]  modified independence at 07/15/2020 1035   Time Frame  long-term goal (LTG), 14 days or less at 07/15/2020 1035   Progress/Outcome  goal ongoing at 07/21/2020 0821   Problem Specific Goal 1   Problem-Specific Goal 1  Pt. will demonstrate an improvement in standing balance as evidenced by a Burrell Balance Test score of > than or = to 32/56. at 07/15/2020 1035   Time Frame  short-term goal (STG), 5 - 7 days at 07/15/2020 1035   Problem Specific Goal 2   Problem-Specific Goal 2  Pt. will demonstrate an improvement in standing balance as evidenced by a Burrell Balance Test score of > than or = to 41/56, indicating a low falls risk. at 07/15/2020 1035   Time Frame  long-term goal (LTG), 14 days or less at 07/15/2020 1035        IRF OT Goals      Most Recent Value   Transfer Goal 1   Activity/Assistive Device   toilet at 07/15/2020 0740   [place]  modified independence at 07/21/2020 0731   Time Frame  short-term goal (STG), 5 - 7 days at 07/21/2020 0731   Progress/Outcome  goal met, goal revised this date at 07/21/2020 0731   Transfer Goal 2   Activity/Assistive Device  toilet at 07/15/2020 0740   [place]  modified independence at 07/15/2020 0740   Time Frame  long-term goal (LTG), 2 weeks at 07/15/2020 0740   Progress/Outcome  goal ongoing at 07/21/2020 0731   Transfer Goal 3   Activity/Assistive Device  shower at 07/15/2020 0740   [place]  set-up required, supervision required at 07/15/2020 0740   Time Frame  short-term goal (STG), 5 - 7 days at 07/21/2020 0731   Progress/Outcome  goal not met, goal ongoing at 07/21/2020 0731   Transfer Goal 4   Activity/Assistive Device  shower at 07/15/2020 0740   [place]  set-up required, supervision required at 07/15/2020 0740   Time Frame  long-term goal (LTG), 2 weeks at 07/15/2020 0740   Progress/Outcome  goal ongoing at 07/21/2020 0731   Bathing Goal 1   [place]  supervision required at 07/21/2020 0731   Time Frame  short-term goal (STG), 5 - 7 days at 07/21/2020 0731   Progress/Outcome  goal met, goal revised this date at 07/21/2020 0731   Bathing Goal 2   [place]  supervision required, set-up required at 07/15/2020 0740   Time Frame  long-term goal (LTG), 14 days or less at 07/21/2020 0731   UB Dressing Goal 1   [place]  supervision required at 07/21/2020 0731   Time Frame  short-term goal (STG), 5 - 7 days at 07/21/2020 0731   Progress/Outcome  goal met, goal revised this date at 07/21/2020 0731   UB Dressing Goal 2   [place]  modified independence at 07/15/2020 0740   Time Frame  long-term goal (LTG), 2 weeks at 07/15/2020 0740   Progress/Outcome  goal ongoing at 07/21/2020 0731   LB Dressing Goal 1   [place]  supervision required at 07/21/2020 0731   Time Frame  short-term goal (STG), 5 - 7 days at 07/21/2020 0731    Progress/Outcome  goal met, goal revised this date at 07/21/2020 0731   LB Dressing Goal 2   [place]  modified independence at 07/15/2020 0740   Time Frame  long-term goal (LTG), 2 weeks at 07/15/2020 0740   Progress/Outcome  goal ongoing at 07/21/2020 0731   Grooming Goal 1   [place]  supervision required at 07/15/2020 0740   Time Frame  short-term goal (STG), 5 - 7 days at 07/21/2020 0731   Strategies/Barriers  in stance at 07/15/2020 0740   Progress/Outcome  goal not met, goal ongoing at 07/21/2020 0731   Grooming Goal 2   [place]  modified independence at 07/15/2020 0740   Time Frame  long-term goal (LTG), 2 weeks at 07/15/2020 0740   Strategies/Barriers  in stance at 07/15/2020 0740   Progress/Outcome  goal ongoing at 07/21/2020 0731   Toileting Goal 1   [place]  supervision required at 07/21/2020 0731   Time Frame  short-term goal (STG), 5 - 7 days at 07/21/2020 0731   Progress/Outcome  goal met, goal revised this date at 07/21/2020 0731   Toileting Goal 2   [place]  modified independence at 07/15/2020 0740   Time Frame  long-term goal (LTG), 2 weeks at 07/15/2020 0740   Progress/Outcome  goal ongoing at 07/21/2020 0731          Risk for Complications  DVT: Moderate  Falls: Moderate      The patient's medical prognosis is good to achieve the stated goals below.    Expected Level of Function  Expected Functional Improvement: safety;self-care;mobility  Self-Care: Independent  Sphincter Control: Independent  Transfers: Independent  Locomotion: Independent  Communication: Independent  Social Cognition: Independent  Comment, Expected Level of Function at Discharge: no driving        Discharge Planning:  Anticipated Discharge Disposition: home with home health services  Type of Outpatient Services: physical therapy    Equipment/Device Needs at Discharge  Equipment Currently Used at Home: none  Temporary Housing Plan  Discharge Transition, Temporary Housing Plan: none needed    Anticipated  Discharge Date: 7/28/2020    Needs Identified:  Discharge Services Needed  Services Needed: physical therapy      Team Members Present     Rehab Attending Present:  Jimmy Adame DO    Care Coordinator Present:  Christy Lancaster LSW    Nurse Present:  Patti Mcneal RN    OT Present:  Saige Spear OT    PT Present:  Selin Carrero PT    Psychologist Present:  Ольга Galeano PSY.D        Next Team Conference Date: 07/28/20

## 2020-07-21 NOTE — PLAN OF CARE
Problem: Rehabilitation (IRF) Plan of Care  Goal: Plan of Care Review  Outcome: Progressing  Flowsheets (Taken 7/21/2020 6455)  Plan of Care Reviewed With: patient  IRF Plan of Care Review: progress ongoing, continue  Outcome Summary: ADL completed this session with Cl S/S for all tasks

## 2020-07-21 NOTE — PROGRESS NOTES
Subjective    Patient seen and examined on rounds.  Chart reviewed.  Events overnight noted.  History reviewed briefly with patient.    CC:  Deficits in mobility, transfers, self-care status post posterior lumbar decompression and fusion, lumbar stenosis, lumbar radiculopathy    HPI:  Mr. Jael Herman  is a 70-year-old right-handed -American gentleman with chronic conditions significant for hypertension, hyperlipidemia, benign process hypertrophy, sleep apnea, lumbar stenosis who had chronic low back pain for over 20 years and pain extending down his lower extremities especially right lower extremity with progressive weakness in his right lower extremity over the past year and he says he was noted to have a right foot drop.  He was diagnosed with lumbar spinal stenosis and underwent L3-5 PLDF by Dr. Gregorio Lancaster on 7/6/20 at [facility].  Postoperatively he is allowed weightbearing as tolerated on both lower extremities.  He was recommended a LSO brace for use when out of bed.  He did have urinary frequency postoperatively.  Drains were removed on 7/12/20.  Postoperative course was significant for abdominal pain and he reports multiple imaging studies were done of his abdomen and he also had barium follow-through.  He was seen by surgical team for his abdominal pain.  He reports abdominal x-rays were negative for obstruction.  Amylase, lipase were not elevated.  He says that when he takes Gabapentin he gets abdominal pain and he noticed that even before his surgery.  We will hold that for now.  Also he will be kept on Protonix/Pepcid.  He is on subcutaneous Heparin for DVT prophylaxis.  He is also on Dilaudid and Flexeril for pain control. He has been kept on bowel medications for his constipation issues.  He still reports ongoing abdominal pain and I mentioned to him that we will get another x-ray of his abdomen tomorrow.  If his abdominal pain gets worse, he may need to go to the  "ER for evaluation. On 7/10/20, hemoglobin was 10.1, WBC count 8.3, BUN 15, creatinine 0.9.  He has been needing assistance for mobility, transfers, self-care postoperatively. He is transferred to [facility] on 7/14/20 for further rehabilitation care.    SUBJ:  Pain controlled.  Constipated, but did move bowels 2 days ago.  Noted to have low BP in therapy today.  D/w IM/Dr. Lyles and will adjust holding parameters for cozaar. He is scheduled for suture removal with surgeon Dr. Lancaster on Thursday 1pm.  Denies any fever, chills, sweat, abd discomfort, new numbness, tingling or weakness.    ROS: Denies chest pain or shortness of breath. Other ROS negative. Past, family, social history is unchanged.      Physical Exam      Blood pressure 98/66, pulse (!) 110, temperature 36.9 °C (98.4 °F), temperature source Oral, resp. rate 18, height 1.803 m (5' 11\"), weight 81.6 kg (180 lb), SpO2 96 %.  Body mass index is 25.1 kg/m².      Physical Exam   Constitutional: He is oriented to person, place, and time. Vital signs are normal. He appears well-developed and well-nourished.   HENT:   Head: Normocephalic and atraumatic.   Eyes: Pupils are equal, round, and reactive to light. Conjunctivae and EOM are normal.   Cardiovascular: Normal rate and regular rhythm.   Pulmonary/Chest: Effort normal and breath sounds normal.   Abdominal: Soft. Normal appearance and bowel sounds are normal.   Genitourinary:   Genitourinary Comments: No brand catheter   Musculoskeletal: Normal range of motion.   No jt erythema, warmth or effusion   Neurological: He is alert and oriented to person, place, and time. He displays no tremor. No cranial nerve deficit. He exhibits normal muscle tone. He displays no seizure activity. Gait abnormal. Coordination normal.   Fluent, follows commands, strength >3/5 bilat UEs and LLE; strength RLE DF 2-3/5 and PF 3/5, tone 0/4.   Skin: Skin is warm, dry and intact.        Psychiatric: He has a normal " mood and affect. His speech is normal and behavior is normal.         Current Facility-Administered Medications:   •  acetaminophen (TYLENOL) tablet 650 mg, 650 mg, oral, q4h PRN, Fredis Lyles MD  •  albuterol HFA (VENTOLIN HFA) 90 mcg/actuation inhaler 2 puff, 2 puff, inhalation, q6h PRN, Fredis Lyles MD  •  alum-mag hydroxide-simeth (MAALOX) 200-200-20 mg/5 mL suspension 30 mL, 30 mL, oral, q6h PRN, Fredis Lyles MD, 30 mL at 07/21/20 0217  •  bisacodyL (DULCOLAX) 10 mg suppository 10 mg, 10 mg, rectal, Daily PRN, Fredis Lyles MD  •  calcium carbonate (TUMS) chewable tablet 1,000 mg, 1,000 mg, oral, 3x daily PRN, Fredis Lyles MD  •  cholecalciferol (vitamin D3) tablet 1,000 Units, 1,000 Units, oral, Daily, Fredis Lyles MD, 1,000 Units at 07/21/20 0831  •  cyanocobalamin (VITAMIN B12) tablet 1,000 mcg, 1,000 mcg, oral, Daily, Fredis Lyles MD, 1,000 mcg at 07/21/20 0831  •  cyclobenzaprine (FLEXERIL) tablet 10 mg, 10 mg, oral, 3x daily PRN, Fredis Lyles MD  •  famotidine (PEPCID) tablet 20 mg, 20 mg, oral, BID, Fredis Lyles MD, 20 mg at 07/21/20 0832  •  heparin (porcine) 5,000 unit/mL injection 5,000 Units, 5,000 Units, subcutaneous, q8h CARRIE, Fredis Lyles MD, 5,000 Units at 07/21/20 0537  •  HYDROmorphone (DILAUDID) tablet 2-4 mg, 2-4 mg, oral, q4h PRN, Fredis Lyles MD, 4 mg at 07/15/20 1242  •  losartan (COZAAR) tablet 50 mg, 50 mg, oral, Daily, Fredis Lyles MD, 50 mg at 07/20/20 0854  •  ondansetron ODT (ZOFRAN-ODT) disintegrating tablet 4 mg, 4 mg, oral, q8h PRN, [facility], 4 mg at 07/16/20 1914  •  pantoprazole (PROTONIX) tablet,delayed release (DR/EC) 40 mg, 40 mg, oral, Daily before breakfast, Fredis Lyles MD, 40 mg at 07/21/20 0831  •  polyethylene glycol (MIRALAX) 17 gram packet 17 g, 17 g, oral, Daily, Fredis Lyles MD, 17 g at 07/21/20 0833  •  polyethylene glycol (MIRALAX) 17 gram packet 17 g, 17 g,  oral, Daily PRN, Fredis Lyles MD  •  rosuvastatin (CRESTOR) tablet 5 mg, 5 mg, oral, Daily, Fredis Lyles MD, 5 mg at 07/21/20 0831  •  sennosides-docusate sodium (SENOKOT-S) 8.6-50 mg per tablet 1 tablet, 1 tablet, oral, BID, Fredis Lyles MD, 1 tablet at 07/21/20 0832  •  tamsulosin (FLOMAX) 24 hr ER capsule 0.4 mg, 0.4 mg, oral, Nightly, Fredis Lyles MD, 0.4 mg at 07/20/20 2036       Labs / Radiology    Lab Results   Component Value Date    WBC 7.48 07/20/2020    HGB 12.3 (L) 07/20/2020    HCT 37.9 (L) 07/20/2020    MCV 94.8 07/20/2020     07/20/2020     Lab Results   Component Value Date    GLUCOSE 110 (H) 07/20/2020    CALCIUM 9.6 07/20/2020     07/20/2020    K 4.7 07/20/2020    CO2 25 07/20/2020     07/20/2020    BUN 20 07/20/2020    CREATININE 1.2 07/20/2020       Assessment and Plan    ASSESSMENT PLAN:  1. 70-year-old right-handed -American gentleman with chronic conditions significant for hypertension, hyperlipidemia, benign process hypertrophy, sleep apnea, lumbar stenosis who had chronic low back pain for over 20 years and pain extending down his lower extremities especially right lower extremity with progressive weakness in his right lower extremity over the past year and he says he was noted to have a right foot drop.  He was diagnosed with lumbar spinal stenosis and underwent L3-5 PLDF by Dr. Gregorio Lancaster on 7/6/20 at [facility].  Postoperatively he is allowed weightbearing as tolerated on both lower extremities.  He was recommended a LSO brace for use when out of bed.  He did have urinary frequency postoperatively.  Drains were removed on 7/12/20.  Postoperative course was significant for abdominal pain and he reports multiple imaging studies were done of his abdomen and he also had barium follow-through.  He was seen by surgical team for his abdominal pain.  He reports abdominal x-rays were negative for obstruction.  Amylase,  lipase were not elevated.  He says that when he takes Gabapentin he gets abdominal pain and he noticed that even before his surgery.  We will hold that for now.  Also he will be kept on Protonix/Pepcid.  He is on subcutaneous Heparin for DVT prophylaxis.  He is also on Dilaudid and Flexeril for pain control. He has been kept on bowel medications for his constipation issues.  He still reports ongoing abdominal pain and I mentioned to him that we will get another x-ray of his abdomen tomorrow.  If his abdominal pain gets worse, he may need to go to the ER for evaluation. On 7/10/20, hemoglobin was 10.1, WBC count 8.3, BUN 15, creatinine 0.9.  He has been needing assistance for mobility, transfers, self-care postoperatively. He is transferred to [facility] on 7/14/20 for further rehabilitation care.     2. DVT prophylaxis - on Heparin.  On SCDs.  Platelets 385 on 7/15/2020.     3.  Lumbar stenosis - status post posterior lumbar decompression and fusion, lumbar stenosis, lumbar radiculopathy - continue PT, OT, psychology.  Monitor healing of lumbar incision.  He has chronic right foot drop.     4. GI - On Protonix for GI prophylaxis. On Pepcid.  MiraLAX, Senokot-S.  On PRN Dulcolax.  On PRN Maalox.  On Tums when necessary.  On PRN MiraLAX.  We will get abdominal x-ray tomorrow due to ongoing abdominal pain.Discussed results of abdominal x-ray with him.  He had some left upper quadrant pain today while in therapy and had to return back to room.  Discussed with him we can send him to [facility] for evaluation but he wants to hold off.  He wants to wait another day and see how he feels tomorrow and if he is any worse he will go to the ER for evaluation for further work-up.  Discussed with nurse.  Discussed with patient that if he feels worse in the evening or at nighttime house physician can evaluate him and send him to [facility] for evaluation if needed.He reports abdominal pain is less today.  He does not feel  the need to go to the ER.  Progressing in therapy.  He reports decreased abdominal pain today.  He reports he will take MiraLAX tonight as he has some constipation.     5.  - voiding.  Denies dysuria.  Monitor post void residuals.  He has some urinary frequency.  On Flomax.     6.  Hypertension -  on Cozaar.     7. Pain - on when necessary Dilaudid.  On Flexeril PRN.  On Tylenol PRN.  Gabapentin was discontinued as patient says it causes abdominal pain for him.  He noticed this even prior to his surgery per patient.     8. Hematology -hemoglobin 11.8, WBC 8.99 on 11/15/2020.     9.  Hyperlipidemia - on Crestor.      10. F/E/N - on vitamin D.     11. Rehabilitation medicine - Continue comprehensive rehabilitation care. Continue PT, OT, speech, psychology.  We will follow falls precautions, cardiac precautions, monitor pulse oximetry in therapy and follow aspiration precautions.  We will follow spinal precautions.He reports he still had some abdominal pain today but is decreasing.  Abdominal x-rays were ordered.  Tolerating diet.Discussed results of abdominal x-ray with him.  He had some left upper quadrant pain today while in therapy and had to return back to room.  Discussed with him we can send him to [facility] for evaluation but he wants to hold off.  He wants to wait another day and see how he feels tomorrow and if he is any worse he will go to the ER for evaluation for further work-up.  Discussed with nurse.  Discussed with patient that if he feels worse in the evening or at nighttime house physician can evaluate him and send him to [place] [facility] for evaluation if needed.He reports abdominal pain is less today.  He does not feel the need to go to the ER.  Progressing in therapy.  He is ambulating up to 150 feet in therapy close supervision to min assist.  Working with occupational therapy.  He reports he will take MiraLAX tonight as he has some constipation.Progressing in therapy.  Decreased abdominal pain per  patient.  Able to walk better in therapy. Ambulating well in physical therapy.  He denies much abdominal pain today.  He indicates he has follow-up appointment at his surgeon's office on Thursday 7/23 at 1:15 PM.  Order written to that effect.  Discussed with nursing.  He says he is hoping to be discharged by next weekend.  Discussed with him he will be team next week.  Will eval in brace rounds regarding AFO RLE to assist with DF weakness.     12. Reviewed labs today.  Mild anemia    Dispo: home with home on 7/28    Jimmy Adame, DO  7/21/2020

## 2020-07-21 NOTE — PROGRESS NOTES
Patient: Jael Herman  Location: [facility] [address]  MRN: [id number]  Today's date: 7/21/2020    History of Present Illness  Jael Herman is a 70 y.o. male whose primary indication for inpatient rehabilitation is Spinal Cord, Non-Traumatic.  Pertinent History of Current Functional Problem:  He has a history of Spinal stenosis of lumbar region with neurogenic claudication [M48.062] and was admitted for a lumbar spinal fusion. Underwent L3-5 PLDF (posterior lumbar decompression, L4-5 posterior lumbar fusion with instrumentation.  by Dr. Gregorio Lancaster 7/6/20 at [facility]. Post op he had anemia but did not require transfusion. His pain was managed with Tylenol, Neurontin, Flexeril and Dilaudid.  He was put on SQ Heparin for DVT ppx. The patient was seen by PM&R and found to have residual deficits in ambulation and ADLs due to S/P lumbar spinal fusion [Z98.1] and was transferred to [facility] on 7/14/2020 for acute inpatient rehab.      Past Medical History  Jael has a past medical history of BPH (benign prostatic hyperplasia), DJD (degenerative joint disease), Hypertension, Lumbar stenosis, and BEN (obstructive sleep apnea).    PT Vitals    Date/Time Pulse HR Source BP BP Location BP Method Pt Position [facility]   07/21/20 1430 111 Monitor 117/65 Right upper arm Automatic Sitting BG      PT Pain    Date/Time Pain Type Pref Pain Scale Orientation Location Rating: Rest Interventions [facility]   07/21/20 1430 Pain Assessment number (Numeric Rating Pain Scale) lower back 4 premedicated for activity    07/21/20 1459 Pain Reassessment number (Numeric Rating Pain Scale) lower back 4 position adjusted BG          Prior Living Environment      Most Recent Value   Lives With  spouse   Living Arrangements  house   Home Accessibility  stairs to enter home (Group)   Number of Stairs, Main Entrance  4   Surface of Stairs, Main Entrance  concrete   Stair Railings, Main Entrance  railing on  "left side (ascending)   Landing, Stairs, Main Entrance  railings present   Stairs Comment, Main Entrance  side or front entrance   Location, Kitchen  first (main) floor   Kitchen Access Comment  pt completes IADLs at home   Location, Patient Bedroom  second floor, must negotiate stairs to access   Patient Bedroom Access Comment  has bed on first floor   Location, Bathroom  first (main) floor, second floor, must negotiate stairs to access   Bathroom Access Comment  1st floor powder room, tub shower, wife has shower bench, renovating master bedroom closet into bathroom, standard toilets, no grab bars   Number of Stairs, Within Home, Secondary  other (see comments)   Stairs Comment, Within Home, Secondary  has stair lift for wife          Prior Level of Function      Most Recent Value   Dominant Hand  right   Ambulation  independent   Transferring  independent   Toileting  independent   Bathing  independent   Dressing  independent   Eating  independent   Equipment Currently Used at Home  none          IRF PT Evaluation and Treatment - 07/21/20 1430        Time Calculation    Start Time  1430     Stop Time  1500     Time Calculation (min)  30 min        Session Details    Document Type  daily treatment/progress note     Mode of Treatment  physical therapy;individual therapy        General Information    Patient Profile Reviewed?  yes     General Observations of Patient  pt received in room stating \"I'm pissed off and will not stay past Friday\"     Existing Precautions/Restrictions  cardiac;fall;spinal        MMT: Right Ankle/Foot    MMT, Gross Movement: Right Ankle Plantarflexion  (2+/5) poor plus     MMT, Gross Movement: Right Ankle Dorsiflexion  (2+/5) poor plus     Comment, MMT: Right Ankle/Foot  pt has limited DF AROM against gravity <50% of available PROM        Sit to Stand Transfer    [place], Sit to Stand Transfer  supervision     Verbal Cues  safety     Assistive Device  walker, front-wheeled     Comment  " from WC and therapy mat        Stand to Sit Transfer    [place], Stand to Sit Transfer  supervision     Verbal Cues  safety     Assistive Device  walker, front-wheeled     Comment  to WC and thearpy mat        Stand Pivot Transfer    [place], Stand Pivot/Stand Step Transfer  supervision     Verbal Cues  safety     Assistive Device  walker, front-wheeled     Comment  ambulatory approach        Gait Training    [place], Gait  close supervision     Assistive Device  walker, front-wheeled     Distance in Feet  150 feet     Gait Pattern Utilized  step-through     Deviations/Abnormal Patterns (Gait)  gait speed decreased;stride length decreased    forward flexed posture    Bilateral Gait Deviations  heel strike decreased     Right Sided Gait Deviations  heel strike decreased     Comment  Cl S for safety        Daily Progress Summary (PT)    Daily Outcome Statement (PT)  Reassessed pt's R ankle DF strength, observed to be 2+/5, demonstrating active movement against gravity, though <50% of available range of motion. Discussed brace rounds scheduled for tomorrow. Also, will discuss pt's feelings expressed at start of session with medical team for further assessment of discharge planning. Pt handed off to next PT session for further assessment of R orthotic needs.     Recommendations  cont to progress LE strengthening, balance training, and mobility                            IRF PT Goals      Most Recent Value   Bed Mobility Goal 1   Activity/Assistive Device  bed mobility activities, all at 07/15/2020 1035   [place]  supervision required, verbal cues required at 07/15/2020 1035   Time Frame  short-term goal (STG), 5 - 7 days at 07/15/2020 1035   Progress/Outcome  goal ongoing at 07/21/2020 0821   Bed Mobility Goal 2   Activity/Assistive Device  bed mobility activities, all at 07/15/2020 1035   [place]  modified independence at 07/15/2020 1035   Time Frame  long-term goal (LTG), 14 days or less at  07/15/2020 1035   Progress/Outcome  goal ongoing at 07/21/2020 0821   Transfer Goal 1   Activity/Assistive Device  sit-to-stand/stand-to-sit, bed-to-chair/chair-to-bed, stand pivot, car transfer at 07/15/2020 1035   [place]  supervision required, verbal cues required at 07/15/2020 1035   Time Frame  short-term goal (STG), 5 - 7 days at 07/15/2020 1035   Progress/Outcome  goal ongoing at 07/21/2020 0821   Transfer Goal 2   Activity/Assistive Device  sit-to-stand/stand-to-sit, bed-to-chair/chair-to-bed, stand pivot, car transfer at 07/15/2020 1035   [place]  modified independence at 07/15/2020 1035   Time Frame  long-term goal (LTG), 14 days or less at 07/15/2020 1035   Progress/Outcome  goal ongoing at 07/21/2020 0821   Gait/Walking Locomotion Goal 1   Activity/Assistive Device  gait (walking locomotion), diminish gait deviation, increase endurance/gait distance, improve balance and speed at 07/15/2020 1035   Distance  150 feet at 07/15/2020 1035   [place]  supervision required, verbal cues required at 07/15/2020 1035   Time Frame  short-term goal (STG), 5 - 7 days at 07/15/2020 1035   Progress/Outcome  goal ongoing at 07/21/2020 0821   Gait/Walking Locomotion Goal 2   Activity/Assistive Device  gait (walking locomotion), diminish gait deviation, increase endurance/gait distance, improve balance and speed at 07/15/2020 1035   Distance  250 feet at 07/15/2020 1035   [place]  modified independence at 07/15/2020 1035   Time Frame  long-term goal (LTG), 14 days or less at 07/15/2020 1035   Progress/Outcome  goal ongoing at 07/21/2020 0821   Stairs Goal 1   Activity/Assistive Device  stairs, all skills, using handrail, left, using handrail, right at 07/15/2020 1035   Number of Stairs  12 at 07/15/2020 1035   [place]  supervision required, verbal cues required at 07/15/2020 1035   Time Frame  short-term goal (STG), 5 - 7 days at 07/15/2020 1035   Progress/Outcome  goal ongoing at 07/21/2020 0821    Stairs Goal 2   Activity/Assistive Device  stairs, all skills [using 1 railing.] at 07/15/2020 1035   Number of Stairs  12 at 07/15/2020 1035   [place]  modified independence at 07/15/2020 1035   Time Frame  long-term goal (LTG), 14 days or less at 07/15/2020 1035   Progress/Outcome  goal ongoing at 07/21/2020 0821   Problem Specific Goal 1   Problem-Specific Goal 1  Pt. will demonstrate an improvement in standing balance as evidenced by a Burrell Balance Test score of > than or = to 32/56. at 07/15/2020 1035   Time Frame  short-term goal (STG), 5 - 7 days at 07/15/2020 1035   Problem Specific Goal 2   Problem-Specific Goal 2  Pt. will demonstrate an improvement in standing balance as evidenced by a Burrell Balance Test score of > than or = to 41/56, indicating a low falls risk. at 07/15/2020 1035   Time Frame  long-term goal (LTG), 14 days or less at 07/15/2020 1035

## 2020-07-21 NOTE — PLAN OF CARE
Problem: Rehabilitation (IRF) Plan of Care  Goal: Plan of Care Review  Flowsheets (Taken 7/21/2020 6054)  Plan of Care Reviewed With: patient  IRF Plan of Care Review: other (see comments)  Outcome Summary: Met c pt for evaluation; declined RT services at this time. See note for further details.

## 2020-07-21 NOTE — PLAN OF CARE
Problem: Rehabilitation (IRF) Plan of Care  Goal: Plan of Care Review  Outcome: Progressing  Flowsheets (Taken 7/21/2020 1030)  Plan of Care Reviewed With: patient  IRF Plan of Care Review: progress ongoing, continue  Outcome Summary: Meds reviewed with pt

## 2020-07-21 NOTE — PLAN OF CARE
Problem: Rehabilitation (IRF) Plan of Care  Goal: Plan of Care Review  Outcome: Progressing  Flowsheets (Taken 7/21/2020 2552)  Plan of Care Reviewed With: patient; spouse  IRF Plan of Care Review: progress ongoing, continue  Outcome Summary: Recomendation of discharge o 7/28 home with homecare given to patient and wife. Per patient he does not wan to stay past 7/24 and understands that he will need supervision. CM called wife and reviewed. Wife states that daughter Ivone will be staying with patient and aiding with care. Daughter from [place] to stay with  family starting 08/08/20. Referral sent to [facility].

## 2020-07-21 NOTE — PLAN OF CARE
Problem: Skin Injury Risk Increased  Goal: Skin Health and Integrity  Outcome: Not progressing  Intervention: Promote and Optimize Oral Intake  Flowsheets (Taken 7/21/2020 1109)  Oral Nutrition Promotion: calorie dense liquids provided; nutritional therapy counseling provided     Problem: Oral Intake Inadequate  Goal: Improved Oral Intake  Outcome: Not progressing     Nutrition Interventions/ Recommendations:   1. Continue Regular diet, will send grape juice and tea with meals as requested by patient  2. Will d/c Breeze supplements and try Gelatein BID  3. T/C appetite stimulant if po intake remains poor  4. Please weigh patient  5. Will monitor po intake, weight, labs

## 2020-07-21 NOTE — PROGRESS NOTES
[facility] Progress Note          Patient was seen and examined.   Attestation Notes: Face to face encounter completed    Jael Herman is a 70 y.o. male who was admitted for S/P lumbar spinal fusion [Z98.1]. Patient was referred by Anoop Yeh MD for medical assessment and management      CC: S/P lumbar spinal fusion [Z98.1]     HPI: Jael Herman is a 70 y.o. male with HTN, HL, BPH, BEN (no CPAP), and lumbar stenosis who underwent L3-5 PLDF by Dr. Gregorio Lancaster 7/6/20 at [facility]. Post op he had anemia but did not require transfusion. His pain was managed with Tylenol, Neurontin, Flexeril and Dilaudid.  He was put on SQ Heparin for DVT ppx.      He remained on Losartan for HTN and Crestor for HL. He was also on Flomax for BPH.      He had some post op abdominal discomfort and nausea. Xrays negative for obstruction. Amylase and lipase were not elevated. He was treated with Senokot, Colace, Miralax and Dulcolax suppositories for constipation.      The patient was seen by PM&R and found to have residual deficits in ambulation and ADLs due to S/P lumbar spinal fusion [Z98.1] and came to [facility] on 7/14/2020 for acute inpatient rehab.     He participated in therapy.     SUBJECTIVE:  Patient interviewed and examined    He feels he is ready for discharge home sooner than 7/28/20     Still with tachycardia, ECG shows sinus tachy, otherwise normal    He still complains of LLQ pain but better, no nausea     Back pain remains stable, no new weakness or numbness, no dysuria, no chest pain, palpitations, dyspnea or fevers    Review of Systems:  All other systems reviewed and negative except as noted in the HPI.    Current meds and allergies reviewed    Past Medical History:   Diagnosis Date   • BPH (benign prostatic hyperplasia)    • DJD (degenerative joint disease)    • Hypertension    • Lumbar stenosis    • BEN (obstructive sleep apnea)      Past Surgical History:    Procedure Laterality Date   • POSTERIOR FUSION LUMBAR SPINE  07/06/2020    L3-5 PLDF     Social History     Tobacco Use   • Smoking status: Never Smoker   • Smokeless tobacco: Never Used   Substance Use Topics   • Alcohol use: Not Currently   • Drug use: Never      History reviewed. No pertinent family history.    Vital signs in last 24 hours:  Temp:  [36.7 °C (98.1 °F)-37.1 °C (98.8 °F)] 36.9 °C (98.4 °F)  Heart Rate:  [] 110  Resp:  [18-20] 18  BP: ()/(55-69) 98/66  Vital signs reviewed 07/21/20 12:53 PM    Physical Exam   Constitutional: He is oriented to person, place, and time. He appears well-developed and well-nourished.   HENT:   Head: Normocephalic and atraumatic.   Right Ear: External ear normal.   Left Ear: External ear normal.   Nose: Nose normal.   Mouth/Throat: Oropharynx is clear and moist.   Eyes: Pupils are equal, round, and reactive to light. Conjunctivae and EOM are normal.   Neck: Normal range of motion. Neck supple.   Cardiovascular: Normal rate, regular rhythm, normal heart sounds and intact distal pulses. Exam reveals no gallop and no friction rub.   No murmur heard.  Pulmonary/Chest: Effort normal and breath sounds normal. No stridor. No respiratory distress. He has no wheezes. He has no rales.   Abdominal: Soft. Bowel sounds are normal. He exhibits no distension. There is no tenderness.   Musculoskeletal: He exhibits edema and deformity (s/p lumbar fusion).   Neurological: He is alert and oriented to person, place, and time.   Skin: Skin is warm and dry.   Psychiatric: He has a normal mood and affect. His behavior is normal.   Nursing note and vitals reviewed.              Objective    Labs:  I have reviewed the patient's labs.  Significant abnormals are anemia.  Results from last 7 days   Lab Units 07/20/20  0705   WBC K/uL 7.48   HEMOGLOBIN g/dL 12.3*   HEMATOCRIT % 37.9*   PLATELETS K/uL 325     Results from last 7 days   Lab Units 07/20/20  0705   SODIUM mEQ/L 137    POTASSIUM mEQ/L 4.7   CHLORIDE mEQ/L 102   CO2 mEQ/L 25   BUN mg/dL 20   CREATININE mg/dL 1.2   CALCIUM mg/dL 9.6   ALBUMIN g/dL 3.8   BILIRUBIN TOTAL mg/dL 1.1   ALK PHOS IU/L 64   ALT IU/L 22   AST IU/L 17   GLUCOSE mg/dL 110*     Results from last 7 days   Lab Units 07/15/20  0527   AMYLASE U/L 112*     Lab Results   Component Value Date    LIPASE 34 07/15/2020     Lab Results   Component Value Date    [id number] 112 (L) 07/15/2020       Imagin/15/20: obstruction series:  IMPRESSION:   1.  No acute cardiopulmonary disease.  2.  Possible mild ileus.    Cardiac studies:  20: ECG: Sinus tachycardia      ASSESSMENT/PLAN:    70 y.o. male  with HTN, HL, BPH, BEN (no CPAP), and lumbar stenosis who underwent L3-5 PLDF by Dr. Gregorio Lancaster 20 at [facility]     1. Neuro:  -lumbar stenosis s/p L3-5 PLDF  -Tylenol and Dilaudid prn pain  -has follow up with Dr. Gregorio Lancaster 20     2. Vasc:  -bilat LE edema  -SCDs and SQ Heparin for DVT ppx  -doppler US bilat LE's neg. 7/15/20     3. Heme:  -anemia due to blood loss, no iron due to GI distress  -B12 low, added oral B12  -follow CBC     4. Renal:  -increased risk of dehydration and electrolyte changes  -follow BMP, Mg     5. Gi:  -abdominal pain since surgery, repeated xrays without obstruction, seen by general surgery  -7/15/20 obstruction series with mild ileus  -7/15/20 amylase slightly elevated and lipase normal, not likely cause of discomfort  -Senokot-S and Miralax for bowels  -Protonix and Pepcid for GERD and ulcer ppx     6. Gu:  -BPH on Flomax  -no UTI or retention     7. Cardiac:  -tachycardia due to therapy, pain, anxiety  -20 ECG with sinus tach but otherwise normal  -HTN on Losartan  -watch for orthostatic hypotension  -use cardiac precautions in therapy     8. Pulm:  -BEN but no CPAP  -incentive spirometry for atelectasis     9. Derm:  -seen by [employer] for skin assessment     10. Nutrition:  -seen by  Nutrition for assessment and education     11. Endo:  -HL on Crestor     12. Psych:  -seen by Psychology for support    13. Dispo:  -anticipated discharge 7/28/20 but he would like to go home sooner     plan discussed with patient, nurse, case management and Anoop Yeh MD Scott Sapperstein, MD  7/21/2020  12:53 PM

## 2020-07-21 NOTE — PLAN OF CARE
Problem: Rehabilitation (IRF) Plan of Care  Goal: Plan of Care Review  Outcome: Progressing  Flowsheets (Taken 7/21/2020 6338)  Outcome Summary: Session focused on assessment of trial of bracing. Pt with slight footslap noted on R no significant increase with faitgue.  Pt with slight improvement with PLSO, toe off, Walk Aide AFO.

## 2020-07-21 NOTE — PLAN OF CARE
Problem: Rehabilitation (IRF) Plan of Care  Goal: Plan of Care Review  Flowsheets (Taken 7/21/2020 4700)  Plan of Care Reviewed With: patient  IRF Plan of Care Review: progress ongoing, continue  Outcome Summary: Reassessed pt's R ankle DF strength, observed to be 2+/5, demonstrating active movement against gravity, though <50% of available range of motion. Discussed brace rounds scheduled for tomorrow. Also, will discuss pt's feelings expressed at start of session with medical team for further assessment of discharge planning. Pt handed off to next PT session for further assessment of R orthotic needs.

## 2020-07-21 NOTE — PROGRESS NOTES
Patient: Jael Herman  Location: [facility] [address]  MRN: [id number]  Today's date: 7/21/2020    History of Present Illness  Jael Herman is a 70 y.o. male whose primary indication for inpatient rehabilitation is Spinal Cord, Non-Traumatic.  Pertinent History of Current Functional Problem:  He has a history of Spinal stenosis of lumbar region with neurogenic claudication [M48.062] and was admitted for a lumbar spinal fusion. Underwent L3-5 PLDF (posterior lumbar decompression, L4-5 posterior lumbar fusion with instrumentation.  by Dr. Gregorio Lancaster 7/6/20 at [facility]. Post op he had anemia but did not require transfusion. His pain was managed with Tylenol, Neurontin, Flexeril and Dilaudid.  He was put on SQ Heparin for DVT ppx. The patient was seen by PM&R and found to have residual deficits in ambulation and ADLs due to S/P lumbar spinal fusion [Z98.1] and was transferred to [facility] on 7/14/2020 for acute inpatient rehab.      Past Medical History  Jael has a past medical history of BPH (benign prostatic hyperplasia), DJD (degenerative joint disease), Hypertension, Lumbar stenosis, and BEN (obstructive sleep apnea).    PT Vitals    Date/Time Pulse BP BP Location BP Method Pt Position [facility]   07/21/20 1501 111 118/65 Right upper arm Automatic Sitting    07/21/20 1559 114 117/68 Right upper arm Automatic Sitting LG      PT Pain    Date/Time Pain Type Location Rating: [facility]   07/21/20 1501 Pain Assessment back 4 LG   07/21/20 1559 Post Activity back 4 LG          Prior Living Environment      Most Recent Value   Lives With  spouse   Living Arrangements  house   Home Accessibility  stairs to enter home (Group)   Number of Stairs, Main Entrance  4   Surface of Stairs, Main Entrance  concrete   Stair Railings, Main Entrance  railing on left side (ascending)   Landing, Stairs, Main Entrance  railings present   Stairs Comment, Main Entrance  side or front entrance    Location, Kitchen  first (main) floor   Kitchen Access Comment  pt completes IADLs at home   Location, Patient Bedroom  second floor, must negotiate stairs to access   Patient Bedroom Access Comment  has bed on first floor   Location, Bathroom  first (main) floor, second floor, must negotiate stairs to access   Bathroom Access Comment  1st floor powder room, tub shower, wife has shower bench, renovating master bedroom closet into bathroom, standard toilets, no grab bars   Number of Stairs, Within Home, Secondary  other (see comments)   Stairs Comment, Within Home, Secondary  has stair lift for wife          Prior Level of Function      Most Recent Value   Dominant Hand  right   Ambulation  independent   Transferring  independent   Toileting  independent   Bathing  independent   Dressing  independent   Eating  independent   Equipment Currently Used at Home  none          IRF PT Evaluation and Treatment - 07/21/20 1501        Time Calculation    Start Time  1500     Stop Time  1600     Time Calculation (min)  60 min        Session Details    Document Type  daily treatment/progress note     Mode of Treatment  individual therapy;physical therapy        General Information    General Observations of Patient  Pt received in gym direct hand off from prior PT session.         Sit to Stand Transfer    [place], Sit to Stand Transfer  supervision     Verbal Cues  safety     Assistive Device  walker, front-wheeled     Comment  from w/c, mat cues for placement         Stand to Sit Transfer    [place], Stand to Sit Transfer  supervision     Verbal Cues  safety     Assistive Device  walker, front-wheeled     Comment  from w/c, mat cues for hand placment          Stand Pivot Transfer    [place], Stand Pivot/Stand Step Transfer  supervision     Verbal Cues  safety     Assistive Device  walker, front-wheeled     Comment  via ambulation         Gait Training    [place], Gait  close supervision     Assistive  Device  walker, front-wheeled     Distance in Feet  150 feet     Gait Pattern Utilized  step-through     Bilateral Gait Deviations  heel strike decreased     Right Sided Gait Deviations  heel strike decreased     Comment  X 5 trials with use of PLSO, Walk on AFO, Toe off, Elevate, MAFO wiht [place] without shoes        Daily Progress Summary (PT)    Daily Outcome Statement (PT)  Session focused on assessment of trial of bracing. Pt with slight footslap noted on R no significant increase with faitgue.  Pt with slight improvement with PLSO, toe off, Walk Aide AFO.       Recommendations  Assess in brace rounds tomorrow                       Education provided this session. See the Patient Education summary report for full details.    IRF PT Goals      Most Recent Value   Bed Mobility Goal 1   Activity/Assistive Device  bed mobility activities, all at 07/15/2020 1035   [place]  supervision required, verbal cues required at 07/15/2020 1035   Time Frame  short-term goal (STG), 5 - 7 days at 07/15/2020 1035   Progress/Outcome  goal ongoing at 07/21/2020 0821   Bed Mobility Goal 2   Activity/Assistive Device  bed mobility activities, all at 07/15/2020 1035   [place]  modified independence at 07/15/2020 1035   Time Frame  long-term goal (LTG), 14 days or less at 07/15/2020 1035   Progress/Outcome  goal ongoing at 07/21/2020 0821   Transfer Goal 1   Activity/Assistive Device  sit-to-stand/stand-to-sit, bed-to-chair/chair-to-bed, stand pivot, car transfer at 07/15/2020 1035   [place]  supervision required, verbal cues required at 07/15/2020 1035   Time Frame  short-term goal (STG), 5 - 7 days at 07/15/2020 1035   Progress/Outcome  goal ongoing at 07/21/2020 0821   Transfer Goal 2   Activity/Assistive Device  sit-to-stand/stand-to-sit, bed-to-chair/chair-to-bed, stand pivot, car transfer at 07/15/2020 1035   [place]  modified independence at 07/15/2020 1035   Time Frame  long-term goal (LTG), 14 days or  less at 07/15/2020 1035   Progress/Outcome  goal ongoing at 07/21/2020 0821   Gait/Walking Locomotion Goal 1   Activity/Assistive Device  gait (walking locomotion), diminish gait deviation, increase endurance/gait distance, improve balance and speed at 07/15/2020 1035   Distance  150 feet at 07/15/2020 1035   [place]  supervision required, verbal cues required at 07/15/2020 1035   Time Frame  short-term goal (STG), 5 - 7 days at 07/15/2020 1035   Progress/Outcome  goal ongoing at 07/21/2020 0821   Gait/Walking Locomotion Goal 2   Activity/Assistive Device  gait (walking locomotion), diminish gait deviation, increase endurance/gait distance, improve balance and speed at 07/15/2020 1035   Distance  250 feet at 07/15/2020 1035   [place]  modified independence at 07/15/2020 1035   Time Frame  long-term goal (LTG), 14 days or less at 07/15/2020 1035   Progress/Outcome  goal ongoing at 07/21/2020 0821   Stairs Goal 1   Activity/Assistive Device  stairs, all skills, using handrail, left, using handrail, right at 07/15/2020 1035   Number of Stairs  12 at 07/15/2020 1035   [place]  supervision required, verbal cues required at 07/15/2020 1035   Time Frame  short-term goal (STG), 5 - 7 days at 07/15/2020 1035   Progress/Outcome  goal ongoing at 07/21/2020 0821   Stairs Goal 2   Activity/Assistive Device  stairs, all skills [using 1 railing.] at 07/15/2020 1035   Number of Stairs  12 at 07/15/2020 1035   [place]  modified independence at 07/15/2020 1035   Time Frame  long-term goal (LTG), 14 days or less at 07/15/2020 1035   Progress/Outcome  goal ongoing at 07/21/2020 0821   Problem Specific Goal 1   Problem-Specific Goal 1  Pt. will demonstrate an improvement in standing balance as evidenced by a Burrell Balance Test score of > than or = to 32/56. at 07/15/2020 1035   Time Frame  short-term goal (STG), 5 - 7 days at 07/15/2020 1035   Problem Specific Goal 2   Problem-Specific Goal 2  Pt. will demonstrate an  improvement in standing balance as evidenced by a Burrell Balance Test score of > than or = to 41/56, indicating a low falls risk. at 07/15/2020 1035   Time Frame  long-term goal (LTG), 14 days or less at 07/15/2020 1035

## 2020-07-21 NOTE — PROGRESS NOTES
Patient: Jael Herman  Location: [facility]  MRN: [id number]  Today's date: 7/21/2020    History of Present Illness  Jael Herman is a 70 y.o. male whose primary indication for inpatient rehabilitation is Spinal Cord, Non-Traumatic.  Pertinent History of Current Functional Problem:  He has a history of Spinal stenosis of lumbar region with neurogenic claudication [M48.062] and was admitted for a lumbar spinal fusion. Underwent L3-5 PLDF (posterior lumbar decompression, L4-5 posterior lumbar fusion with instrumentation.  by Dr. Gregorio Lancaster 7/6/20 at [facility]. Post op he had anemia but did not require transfusion. His pain was managed with Tylenol, Neurontin, Flexeril and Dilaudid.  He was put on SQ Heparin for DVT ppx. The patient was seen by PM&R and found to have residual deficits in ambulation and ADLs due to S/P lumbar spinal fusion [Z98.1] and was transferred to [facility] on 7/14/2020 for acute inpatient rehab.      Past Medical History  Jael has a past medical history of BPH (benign prostatic hyperplasia), DJD (degenerative joint disease), Hypertension, Lumbar stenosis, and BEN (obstructive sleep apnea).    OT Vitals    Date/Time BP BP Location BP Method Pt Position [facility]   07/21/20 0836 98/66 Left upper arm Manual Sitting KB      OT Pain    Date/Time Pain Type Pref Pain Scale Orientation Location Rating: Rest Interventions [facility]   07/21/20 0836 Pain Assessment number (Numeric Rating Pain Scale) lower back 4 premedicated for activity KB          Prior Living Environment      Most Recent Value   Lives With  spouse   Living Arrangements  house   Home Accessibility  stairs to enter home (Group)   Number of Stairs, Main Entrance  4   Surface of Stairs, Main Entrance  concrete   Stair Railings, Main Entrance  railing on left side (ascending)   Landing, Stairs, Main Entrance  railings present   Stairs Comment, Main Entrance  side or front entrance    Location, Kitchen  first (main) floor   Kitchen Access Comment  pt completes IADLs at home   Location, Patient Bedroom  second floor, must negotiate stairs to access   Patient Bedroom Access Comment  has bed on first floor   Location, Bathroom  first (main) floor, second floor, must negotiate stairs to access   Bathroom Access Comment  1st floor powder room, tub shower, wife has shower bench, renovating master bedroom closet into bathroom, standard toilets, no grab bars   Number of Stairs, Within Home, Secondary  other (see comments)   Stairs Comment, Within Home, Secondary  has stair lift for wife          Prior Level of Function      Most Recent Value   Dominant Hand  right   Ambulation  independent   Transferring  independent   Toileting  independent   Bathing  independent   Dressing  independent   Eating  independent   Equipment Currently Used at Home  none          IRF OT Evaluation and Treatment - 07/21/20 0731        Time Calculation    Start Time  0730     Stop Time  0900     Time Calculation (min)  90 min        Session Details    Document Type  daily treatment/progress note     Mode of Treatment  occupational therapy;individual therapy        General Information    General Observations of Patient  pt reports feeling fatigued this session. Reports didnt sleep well. Willing to participate        Sit to Stand Transfer    [place], Sit to Stand Transfer  supervision     Verbal Cues  safety     Assistive Device  walker, front-wheeled        Stand to Sit Transfer    [place], Stand to Sit Transfer  supervision     Verbal Cues  safety     Assistive Device  walker, front-wheeled        Stand Pivot Transfer    [place], Stand Pivot/Stand Step Transfer  supervision     Verbal Cues  safety     Assistive Device  walker, front-wheeled        Toilet Transfer    Comment  per nursing report, requires S for toilet transfer        Shower Transfer    Comment  per nursing report, required Cl S for shower  transfers        Upper Extremity (Therapeutic Exercise)    Exercise Position/Type (UE Therapeutic Exercise)  seated     General Exercise (Upper Extremity Therapeutic Exercise)  bilateral     Range of Motion Exercises (Upper Extremity Therapeutic Exercise)  shoulder flexion/extension;elbow flexion/extension    chest press    Weight/Resistance (Upper Extremity Therapeutic Exercise)  4 pounds     Reps and Sets (Upper Extremity Therapeutic Exercise)  2 sets of 15     Comment (UE Therapeutic Exercise)  alternating UEs to maintain spinal precautions        Bathing    Comment  per nursing report, pt required CL S for bathing. Pt used figure 4 positioning to wash/dry BLEs        Upper Body Dressing    Self-Performance  obtains clothes;don;threads left arm, bra/undershirt;threads right arm, bra/undershirt;pulls bra/undershirt over head/around back;pulls bra/undershirt down/adjusts;threads left arm, shirt;threads right arm, shirt;pulls shirt over head/around back;pulls shirt down/adjusts;orthosis application     [place]  supervision     Position  edge of bed sitting     Adaptive Equipment  none     Comment  clothing retireval with RW with S        Lower Body Dressing    Self-Performance  obtains clothes;don;doff;threads left leg, underpants;threads right leg, underpants;pulls underpants up or down;threads left leg, pants/shorts;threads right leg, pants/shorts;pulls pants/shorts up or down;dons/doffs left sock;dons/doffs right sock;dons/doffs left shoe;dons/doffs right shoe;shoelaces, left;shoelaces, right     [place] Assistance  obtains clothes     [place]  supervision     Position  edge of bed sitting     Adaptive Equipment  long-handled shoe horn;reacher     [place], Footwear  supervision     Comment  figure 4 positioning to thread legs through pants and to don socks/shoes. therapist retrieved socks/shoes        Grooming    Self-Performance  washes, rinses and dries face;brushes/pressley hair;oral care (brushing  teeth, cleaning dentures)     [place]  set up     Position  supported sitting;sink side     Setup Assistance  obtain supplies     Adaptive Equipment  none     Comment  pt request to sit for grooming tasks to maintain spinal precautions        Weekly Progress Summary (OT)    Progress Toward Functional Goals (OT)  progressing toward functional goals as expected     Weekly Outcome Statement  Pt requires supervision/close supervision for all ADLs and transfers at this time. Pt requires cueing for use of AE to increase independence. Pt demo good carryover of spinal precautions     Impairments Continuing to Limit Function  decreased flexibility;decreased ROM;decreased strength;impaired balance;pain     Recommendations (OT)  continue with IP OT services to increase independence and safety with ADLs, IADLs, and transfers                       Education provided this session. See the Patient Education summary report for full details.    IRF OT Goals      Most Recent Value   Transfer Goal 1   Activity/Assistive Device  toilet at 07/15/2020 0740   [place]  modified independence at 07/21/2020 0731   Time Frame  short-term goal (STG), 5 - 7 days at 07/21/2020 0731   Progress/Outcome  goal met, goal revised this date at 07/21/2020 0731   Transfer Goal 2   Activity/Assistive Device  toilet at 07/15/2020 0740   [place]  modified independence at 07/15/2020 0740   Time Frame  long-term goal (LTG), 2 weeks at 07/15/2020 0740   Progress/Outcome  goal ongoing at 07/21/2020 0731   Transfer Goal 3   Activity/Assistive Device  shower at 07/15/2020 0740   [place]  set-up required, supervision required at 07/15/2020 0740   Time Frame  short-term goal (STG), 5 - 7 days at 07/21/2020 0731   Progress/Outcome  goal not met, goal ongoing at 07/21/2020 0731   Transfer Goal 4   Activity/Assistive Device  shower at 07/15/2020 0740   [place]  set-up required, supervision required at 07/15/2020 0740   Time Frame  long-term  goal (LTG), 2 weeks at 07/15/2020 0740   Progress/Outcome  goal ongoing at 07/21/2020 0731   Bathing Goal 1   [place]  supervision required at 07/21/2020 0731   Time Frame  short-term goal (STG), 5 - 7 days at 07/21/2020 0731   Progress/Outcome  goal met, goal revised this date at 07/21/2020 0731   Bathing Goal 2   [place]  supervision required, set-up required at 07/15/2020 0740   Time Frame  long-term goal (LTG), 14 days or less at 07/21/2020 0731   UB Dressing Goal 1   [place]  supervision required at 07/21/2020 0731   Time Frame  short-term goal (STG), 5 - 7 days at 07/21/2020 0731   Progress/Outcome  goal met, goal revised this date at 07/21/2020 0731   UB Dressing Goal 2   [place]  modified independence at 07/15/2020 0740   Time Frame  long-term goal (LTG), 2 weeks at 07/15/2020 0740   Progress/Outcome  goal ongoing at 07/21/2020 0731   LB Dressing Goal 1   [place]  supervision required at 07/21/2020 0731   Time Frame  short-term goal (STG), 5 - 7 days at 07/21/2020 0731   Progress/Outcome  goal met, goal revised this date at 07/21/2020 0731   LB Dressing Goal 2   [place]  modified independence at 07/15/2020 0740   Time Frame  long-term goal (LTG), 2 weeks at 07/15/2020 0740   Progress/Outcome  goal ongoing at 07/21/2020 0731   Grooming Goal 1   [place]  supervision required at 07/15/2020 0740   Time Frame  short-term goal (STG), 5 - 7 days at 07/21/2020 0731   Strategies/Barriers  in stance at 07/15/2020 0740   Progress/Outcome  goal not met, goal ongoing at 07/21/2020 0731   Grooming Goal 2   [place]  modified independence at 07/15/2020 0740   Time Frame  long-term goal (LTG), 2 weeks at 07/15/2020 0740   Strategies/Barriers  in stance at 07/15/2020 0740   Progress/Outcome  goal ongoing at 07/21/2020 0731   Toileting Goal 1   [place]  supervision required at 07/21/2020 0731   Time Frame  short-term goal (STG), 5 - 7 days at 07/21/2020 0731   Progress/Outcome  goal  met, goal revised this date at 07/21/2020 0731   Toileting Goal 2   [place]  modified independence at 07/15/2020 0740   Time Frame  long-term goal (LTG), 2 weeks at 07/15/2020 0740   Progress/Outcome  goal ongoing at 07/21/2020 0731

## 2020-07-21 NOTE — PROGRESS NOTES
Patient: Jael Herman  Location: [facility]  MRN: [id number]  Today's date: 7/21/2020    Hospital Course  History of Present Illness  Jael Herman is a 70 y.o. male whose primary indication for inpatient rehabilitation is Spinal Cord, Non-Traumatic.  Pertinent History of Current Functional Problem:  He has a history of Spinal stenosis of lumbar region with neurogenic claudication [M48.062] and was admitted for a lumbar spinal fusion. Underwent L3-5 PLDF (posterior lumbar decompression, L4-5 posterior lumbar fusion with instrumentation.  by Dr. Gregorio Lancaster 7/6/20 at [facility]. Post op he had anemia but did not require transfusion. His pain was managed with Tylenol, Neurontin, Flexeril and Dilaudid.  He was put on SQ Heparin for DVT ppx. The patient was seen by PM&R and found to have residual deficits in ambulation and ADLs due to S/P lumbar spinal fusion [Z98.1] and was transferred to [facility] on 7/14/2020 for acute inpatient rehab.      Past Medical History  Jael has a past medical history of BPH (benign prostatic hyperplasia), DJD (degenerative joint disease), Hypertension, Lumbar stenosis, and BEN (obstructive sleep apnea).    Therapy Pain/Vitals - 07/21/20 1100        Pain/Comfort/Sleep    Pain Charting Type  Pain Assessment     Preferred Pain Scale  number (Numeric Rating Pain Scale)     Pain Body Location - Orientation  lower     Pain Body Location  back     Pain Rating (0-10): Activity  4     Pain Management Interventions  premedicated for activity           Prior Living Environment      Most Recent Value   Lives With  spouse   Living Arrangements  house   Home Accessibility  stairs to enter home (Group)   Number of Stairs, Main Entrance  4   Surface of Stairs, Main Entrance  concrete   Stair Railings, Main Entrance  railing on left side (ascending)   Landing, Stairs, Main Entrance  railings present   Stairs Comment, Main Entrance  side or front  entrance   Location, Kitchen  first (main) floor   Kitchen Access Comment  pt completes IADLs at home   Location, Patient Bedroom  second floor, must negotiate stairs to access   Patient Bedroom Access Comment  has bed on first floor   Location, Bathroom  first (main) floor, second floor, must negotiate stairs to access   Bathroom Access Comment  1st floor powder room, tub shower, wife has shower bench, renovating master bedroom closet into bathroom, standard toilets, no grab bars   Number of Stairs, Within Home, Secondary  other (see comments)   Stairs Comment, Within Home, Secondary  has stair lift for wife          Prior Level of Function      Most Recent Value   Dominant Hand  right   Ambulation  independent   Transferring  independent   Toileting  independent   Bathing  independent   Dressing  independent   Eating  independent   Equipment Currently Used at Home  none          Recreational Therapy Evaluation and Treatment - 07/21/20 1100        Session Details    Document Type  initial evaluation     Mode of Treatment  recreational therapy        Time Calculation    Start Time  1100     Stop Time  1130     Time Calculation (min)  30 min        General Information    Patient Profile Reviewed?  yes     Existing Precautions/Restrictions  cardiac;fall;spinal        Coping/Community Reintegration    Observed Emotional State  accepting;calm     Verbalized Emotional State  acceptance        Rec Therapy Clinical Impression    Patient's Goals for Discharge  --     Plan For Care Reviewed: Recreational Therapy  --     Functional Limitations in Following Categories  --     Rehab Potential/Prognosis  --     Rec Therapy Frequency of Treatment  --     Activity Limitations Related to Problem List  --     Daily Outcome Statement  Met c patient for initial evaluation. During eval, pt informed therapist that he is leaving the rehab on Saturday AMA. Given his mindset, he felt it would not be appropriate for him to recieve RT  services. Provided patient c education on RT services despite declining services. Informed CM about patient's thoughts and will reach out to patient today to relay information from team meeting in regards to d/c date. Discontinue RT orde 2* to patient declining services.

## 2020-07-21 NOTE — PROGRESS NOTES
"Nutrition Note  117/[facility]    Clinical Course: Patient is a 70 y.o. male who was admitted on 7/14/2020 with a diagnosis of S/P lumbar spinal fusion [Z98.1].     Nutrition Interventions/ Recommendations:   1. Continue Regular diet, will send grape juice and tea with meals as requested by patient  2. Will d/c Breeze supplements and try Gelatein BID  3. T/C appetite stimulant if po intake remains poor  4. Please weigh patient  5. Will monitor po intake, weight, labs   At nutrition risk level 3    Past Medical History:   Diagnosis Date   • BPH (benign prostatic hyperplasia)    • DJD (degenerative joint disease)    • Hypertension    • Lumbar stenosis    • BEN (obstructive sleep apnea)      Past Surgical History:   Procedure Laterality Date   • POSTERIOR FUSION LUMBAR SPINE  07/06/2020    L3-5 PLDF            Dietary Orders   (From admission, onward)             Start     Ordered    07/14/20 1807  Adult Diet Regular; Thin Liquids  Diet effective now     Question Answer Comment   Diet Texture Regular    Fluid Consistency: Thin Liquids        07/14/20 1807                Reason for Assessment  Reason For Assessment: (Follow-up)  Identified At Risk by Screening Criteria: unintentional loss of 10 lbs or more in the past 2 mos    [facility] Nutrition Screen Tool  Has patient lost weight without trying?: 0-->Yes  If yes,how much weight has been lost?: 2-->Unsure  Has patient been eating poorly due to decreased appetite?: 1-->Yes(abd pain)  [facility] Nutrition Screen Score: 3    Nutrition/Diet History  Diet Prior to Admission: Regular diet at home  Appetite Prior to Admission: Poor-0-25%  Intake (%): 25%  Food Preferences: Chicken noodle soup, fruit ice, applesauce  Cultural/Jain Preferences: None  Supplemental Drinks/Foods/Additives: Dislikes Breeze and \"milky\" supplements  Factors Affecting Nutritional Intake: abdominal pain, anorexia, nausea    Physical Findings  Overall Physical Appearance: (well-nourished)  Gastrointestinal: other " "(see comments)(Abdominal pain)  Last Bowel Movement: 07/19/20  Oral/Mouth Cavity: (WDL)  Skin: surgical incision    Nutrition Order  Nutrition Order Review: meets nutritional requirements  Nutrition Order Comments: Regular/thins    Anthropometrics  Height: 180.3 cm (5' 11\")    Wt Readings from Last 3 Encounters:   07/14/20 81.6 kg (180 lb)   07/09/20 89.4 kg (197 lb)       Weights (last 7 days)     Date/Time   Weight    07/14/20 1800   81.6 kg (180 lb)            Current Weight  Weight Method: Bed scale  Weight: 81.6 kg (180 lb)    Ideal Body Weight (IBW)  Ideal Body Weight (IBW) (kg): 79.27  % Ideal Body Weight: 102.99    Usual Body Weight (UBW)  Usual Body Weight: 89.4 kg (197 lb)  % of Usual Body Weight Assessment: 85-95% - mild deficit  Weight Loss: unintentional  Time Frame: Other (comments)(17# (9%) X 8 days)    Body Mass Index (BMI)  BMI (Calculated): 25.1  BMI (kg/m2): 25.16  BMI Assessment: BMI 25-29.9: overweight  Nutritional Status/Malnutrition: Acute illness or Injury - Severe Malnutrition    Labs/Procedures/Meds  Lab Results Reviewed: reviewed, pertinent  Lab Results Comments: 7/20 glucose 110H    CMP Results       07/20/20 07/15/20 07/10/20                    0705 0527           137 141         K 4.7 4.2 3.8         Cl 102 105 9         CO2 25 24 26         Glucose 110 108 118         BUN 20 19 15         Creatinine 1.2 1.1 0.9         Calcium 9.6 9.3 --         Anion Gap 10 8 --         AST 17 26 --         ALT 22 44 --         Albumin 3.8 3.5 --         EGFR >60.0 >60.0 --                     Lab Results   Component Value Date    ALT 22 07/20/2020    AST 17 07/20/2020    ALKPHOS 64 07/20/2020    BILITOT 1.1 07/20/2020     Lab Results   Component Value Date    TMEEUJWL77 112 (L) 07/15/2020     Lab Results   Component Value Date    CALCIUM 9.6 07/20/2020     Lab Results   Component Value Date    WBC 7.48 07/20/2020    HGB 12.3 (L) 07/20/2020    HCT 37.9 (L) 07/20/2020    MCV 94.8 07/20/2020    "  07/20/2020     No results found for: IRON, TIBC, FERRITIN  No results found for: CHOL  No results found for: HDL  No results found for: LDLCALC  No results found for: TRIG  No results found for: CHOLHDL  Glucose Results     No lab values to display.        • cholecalciferol (vitamin D3)  1,000 Units oral Daily   • cyanocobalamin  1,000 mcg oral Daily   • famotidine  20 mg oral BID   • heparin (porcine)  5,000 Units subcutaneous q8h CARRIE   • losartan  50 mg oral Daily   • pantoprazole  40 mg oral Daily before breakfast   • polyethylene glycol  17 g oral Daily   • rosuvastatin  5 mg oral Daily   • sennosides-docusate sodium  1 tablet oral BID   • tamsulosin  0.4 mg oral Nightly     Medications  Pertinent Medications Reviewed: reviewed, pertinent  Pertinent Medications Comments: Vit D3, VitB12, pepcid, protonix, mirala, senokot    Estimated/Assessed Needs  Additional Documentation: Calorie Requirements (Group), Fluid Requirements (Group), Protein Requirements (Group)    Calorie Requirements  Estimated kCal Needs: Actual Body Weight  Estimated Calorie Need Method: kcal/kg  Calorie/kg Recommended: 25-30  Calorie Recommendations: 5232-9143    Protein Requirements  Recommended Dosing Weight (Estimated Protein Needs): Actual Body Weight  Est Protein Requirement Amount (gms/kg): (1.2-1.3)  Protein Recommendations:     Fluid Requirements  Fluid Recommendation (mL): 25-30ml  Recommended Fluid Needs Dosing Weight: Actual Body Weight  Fluid Requirements (mL/day): (3140-7891)  Estimated Fluid Requirement Method: RDA Method  Benja Method (over 20 kg): 3132.94    PES  Statement: PES Statement  Nutrition Diagnosis: Inadequate Protein-Energy Intake  Related To:: Decreased ability to consume sufficient energy  As Evidenced By:: Very poor po intake, 0-25%  Nutritional Needs Met?: No  Nutrition Status Classification: Severe nutritional compromise                                 Clinical Comments:     Patient  continues with poor po intake and c/o abdominal pain.  To have abdominal x-ray today per MD note.  Patient dislikes the Breeze supplements, will send trial of Gelatein BID.    He requests tea and grape juice with meals.  No f/u weight available.      Goals:  1. Adequate po intake to meet > 75% of nutritional needs  2. Maintain admission weight of 180#  3. Lytes/Labs WNL    Discussed with: Patient    Date: 07/21/20  Signature: Veronika Renner RD

## 2020-07-21 NOTE — PLAN OF CARE
Problem: Rehabilitation (IRF) Plan of Care  Goal: Plan of Care Review  Outcome: Progressing  Flowsheets (Taken 7/21/2020 0043)  Plan of Care Reviewed With: patient  IRF Plan of Care Review: progress ongoing, continue  Outcome Summary: Pt alert and oriented x3. Continent of bowel and bladder.  No c/o pain or distress. Scheduled meds admistered and tolerated. Safety maintained, call bell in reach. In bed, no problem sleeping. Monitoring ongoing.

## 2020-07-22 ENCOUNTER — APPOINTMENT (INPATIENT)
Dept: OCCUPATIONAL THERAPY | Facility: REHABILITATION | Age: 70
DRG: 560 | End: 2020-07-22
Payer: MEDICARE

## 2020-07-22 ENCOUNTER — APPOINTMENT (INPATIENT)
Dept: PHYSICAL THERAPY | Facility: REHABILITATION | Age: 70
DRG: 560 | End: 2020-07-22
Payer: MEDICARE

## 2020-07-22 ENCOUNTER — APPOINTMENT (OUTPATIENT)
Dept: PSYCHOLOGY | Facility: CLINIC | Age: 70
End: 2020-07-22
Payer: MEDICARE

## 2020-07-22 PROCEDURE — 63600000 HC DRUGS/DETAIL CODE: Performed by: HOSPITALIST

## 2020-07-22 PROCEDURE — 97116 GAIT TRAINING THERAPY: CPT | Mod: GP

## 2020-07-22 PROCEDURE — 97530 THERAPEUTIC ACTIVITIES: CPT | Mod: GP

## 2020-07-22 PROCEDURE — 97530 THERAPEUTIC ACTIVITIES: CPT | Mod: GO

## 2020-07-22 PROCEDURE — 12800001 HC ROOM AND CARE SEMIPRIVATE REHAB-BRAIN INJ

## 2020-07-22 PROCEDURE — 97112 NEUROMUSCULAR REEDUCATION: CPT | Mod: GP

## 2020-07-22 PROCEDURE — 90832 PSYTX W PT 30 MINUTES: CPT | Performed by: PSYCHOLOGIST

## 2020-07-22 PROCEDURE — 63700000 HC SELF-ADMINISTRABLE DRUG: Performed by: PHYSICAL MEDICINE & REHABILITATION

## 2020-07-22 PROCEDURE — 63700000 HC SELF-ADMINISTRABLE DRUG: Performed by: HOSPITALIST

## 2020-07-22 RX ADMIN — HEPARIN SODIUM 5000 UNITS: 5000 INJECTION INTRAVENOUS; SUBCUTANEOUS at 05:44

## 2020-07-22 RX ADMIN — SUCRALFATE 1 G: 1 TABLET ORAL at 17:44

## 2020-07-22 RX ADMIN — FAMOTIDINE 20 MG: 20 TABLET ORAL at 21:05

## 2020-07-22 RX ADMIN — HEPARIN SODIUM 5000 UNITS: 5000 INJECTION INTRAVENOUS; SUBCUTANEOUS at 15:17

## 2020-07-22 RX ADMIN — ROSUVASTATIN CALCIUM 5 MG: 5 TABLET, FILM COATED ORAL at 10:27

## 2020-07-22 RX ADMIN — FAMOTIDINE 20 MG: 20 TABLET ORAL at 10:27

## 2020-07-22 RX ADMIN — PANTOPRAZOLE SODIUM 40 MG: 40 TABLET, DELAYED RELEASE ORAL at 10:27

## 2020-07-22 RX ADMIN — SUCRALFATE 1 G: 1 TABLET ORAL at 12:21

## 2020-07-22 RX ADMIN — SUCRALFATE 1 G: 1 TABLET ORAL at 05:45

## 2020-07-22 RX ADMIN — TAMSULOSIN HYDROCHLORIDE 0.4 MG: 0.4 CAPSULE ORAL at 21:05

## 2020-07-22 RX ADMIN — MELATONIN 1000 UNITS: at 10:27

## 2020-07-22 RX ADMIN — CYANOCOBALAMIN TAB 1000 MCG 1000 MCG: 1000 TAB at 10:28

## 2020-07-22 RX ADMIN — HEPARIN SODIUM 5000 UNITS: 5000 INJECTION INTRAVENOUS; SUBCUTANEOUS at 21:05

## 2020-07-22 ASSESSMENT — BALANCE ASSESSMENTS: TOTAL SCORE: 34

## 2020-07-22 NOTE — PLAN OF CARE
Problem: Rehabilitation (IRF) Plan of Care  Goal: Plan of Care Review  Outcome: Progressing  Flowsheets (Taken 7/22/2020 7411)  Plan of Care Reviewed With: patient  IRF Plan of Care Review: progress ongoing, continue  Outcome Summary: Patient able to don brace on and off withoit difficulty.

## 2020-07-22 NOTE — PLAN OF CARE
Problem: Rehabilitation (IRF) Plan of Care  Goal: Plan of Care Review  Flowsheets (Taken 7/22/2020 1038)  Plan of Care Reviewed With: patient  IRF Plan of Care Review: progress ongoing, continue  Outcome Summary: Focus of session on kitchen safety/mobility at RW level. Issued handout for RW tray and overall RW safety tips. Pt plans to have wife/dtr provide assistance to adhere to spinal precautions.

## 2020-07-22 NOTE — PROGRESS NOTES
Patient: Jael Herman  Location: [facility]  MRN: [id number]  Today's date: 7/22/2020    History of Present Illness  Jael Herman is a 70 y.o. male whose primary indication for inpatient rehabilitation is Spinal Cord, Non-Traumatic.  Pertinent History of Current Functional Problem:  He has a history of Spinal stenosis of lumbar region with neurogenic claudication [M48.062] and was admitted for a lumbar spinal fusion. Underwent L3-5 PLDF (posterior lumbar decompression, L4-5 posterior lumbar fusion with instrumentation.  by Dr. Gregorio Lancaster 7/6/20 at [facility]. Post op he had anemia but did not require transfusion. His pain was managed with Tylenol, Neurontin, Flexeril and Dilaudid.  He was put on SQ Heparin for DVT ppx. The patient was seen by PM&R and found to have residual deficits in ambulation and ADLs due to S/P lumbar spinal fusion [Z98.1] and was transferred to [facility] on 7/14/2020 for acute inpatient rehab.      Past Medical History  Jael has a past medical history of BPH (benign prostatic hyperplasia), DJD (degenerative joint disease), Hypertension, Lumbar stenosis, and BEN (obstructive sleep apnea).    OT Vitals    Date/Time Pulse HR Source BP BP Location BP Method Pt Position [facility]   07/22/20 0934 109 Monitor 104/60 Left upper arm Automatic Sitting    07/22/20 0957 103 Monitor 108/63 Left upper arm Automatic Sitting       OT Pain    Date/Time Pain Type Pref Pain Scale Location Rating: Rest Interventions [facility]   07/22/20 0934 Pain Assessment number (Numeric Rating Pain Scale) back 4 position adjusted    07/22/20 0957 Pain Reassessment number (Numeric Rating Pain Scale) back 4 position adjusted           Prior Living Environment      Most Recent Value   Lives With  spouse   Living Arrangements  house   Home Accessibility  stairs to enter home (Group)   Number of Stairs, Main Entrance  4   Surface of Stairs, Main Entrance  concrete   Stair  Railings, Main Entrance  railing on left side (ascending)   Landing, Stairs, Main Entrance  railings present   Stairs Comment, Main Entrance  side or front entrance   Location, Kitchen  first (main) floor   Kitchen Access Comment  pt completes IADLs at home   Location, Patient Bedroom  second floor, must negotiate stairs to access   Patient Bedroom Access Comment  has bed on first floor   Location, Bathroom  first (main) floor, second floor, must negotiate stairs to access   Bathroom Access Comment  1st floor powder room, tub shower, wife has shower bench, renovating master bedroom closet into bathroom, standard toilets, no grab bars   Number of Stairs, Within Home, Secondary  other (see comments)   Stairs Comment, Within Home, Secondary  has stair lift for wife          Prior Level of Function      Most Recent Value   Dominant Hand  right   Ambulation  independent   Transferring  independent   Toileting  independent   Bathing  independent   Dressing  independent   Eating  independent   Equipment Currently Used at Home  none             07/22/20 0843   Time Calculation   Start Time 0830   Stop Time 0930   Time Calculation (min) 60 min   Session Details   Document Type daily treatment/progress note   Mode of Treatment occupational therapy;individual therapy   General Information   Patient Profile Reviewed? yes   General Observations of Patient flat affect throughout session   Sit to Stand Transfer   [place], Sit to Stand Transfer supervision   Assistive Device walker, front-wheeled   Stand to Sit Transfer   [place], Stand to Sit Transfer supervision   Assistive Device walker, front-wheeled   Safety Issues, Functional Mobility   Comment, Safety Issues/Impairments (Mobility) OT: S short HHD c RW   Balance   Balance Interventions occupation based/functional task   Comment, Balance Reviewed kitchen mobility and overall RW safety within kitchen. Issued handout for RW accessories (rolling walker tray) and RW  safety handout. Pt demo'd ability to retrieve pots/pans within lower cabinet using reacher to adhere to spinal precautions, demo'd ability to slid item down counter for safe item transport. Required overall S for balance throughout via amb approach c RW. Pt plans to have wife/dtr provide assist for safe item transport to adhere to 5# lifting restrictions. Pt demo'd ability to retrieve item from cabinet using reacher.   Aerobic Exercise   Type (Aerobic Exercise) arm bike   Time Performed (Aerobic Exercise) 10   Comment (Aerobic Exercise) 5 minutes forward, 5 minutes backward- seated in w/c   Daily Progress Summary (OT)   Daily Outcome Statement (OT) Focus of session on kitchen safety/mobility at RW level. Issued handout for RW tray and overall RW safety tips. Pt plans to have wife/dtr provide assistance to adhere to spinal precautions. Cont c POC.                  Education provided this session. See the Patient Education summary report for full details.    IRF OT Goals      Most Recent Value   Transfer Goal 1   Activity/Assistive Device  toilet at 07/15/2020 0740   [place]  modified independence at 07/21/2020 0731   Time Frame  short-term goal (STG), 5 - 7 days at 07/21/2020 0731   Progress/Outcome  goal met, goal revised this date at 07/21/2020 0731   Transfer Goal 2   Activity/Assistive Device  toilet at 07/15/2020 0740   [place]  modified independence at 07/15/2020 0740   Time Frame  long-term goal (LTG), 2 weeks at 07/15/2020 0740   Progress/Outcome  goal ongoing at 07/21/2020 0731   Transfer Goal 3   Activity/Assistive Device  shower at 07/15/2020 0740   [place]  set-up required, supervision required at 07/15/2020 0740   Time Frame  short-term goal (STG), 5 - 7 days at 07/21/2020 0731   Progress/Outcome  goal not met, goal ongoing at 07/21/2020 0731   Transfer Goal 4   Activity/Assistive Device  shower at 07/15/2020 0740   [place]  set-up required, supervision required at 07/15/2020 0740    Time Frame  long-term goal (LTG), 2 weeks at 07/15/2020 0740   Progress/Outcome  goal ongoing at 07/21/2020 0731   Bathing Goal 1   [place]  supervision required at 07/21/2020 0731   Time Frame  short-term goal (STG), 5 - 7 days at 07/21/2020 0731   Progress/Outcome  goal met, goal revised this date at 07/21/2020 0731   Bathing Goal 2   [place]  supervision required, set-up required at 07/15/2020 0740   Time Frame  long-term goal (LTG), 14 days or less at 07/21/2020 0731   UB Dressing Goal 1   [place]  supervision required at 07/21/2020 0731   Time Frame  short-term goal (STG), 5 - 7 days at 07/21/2020 0731   Progress/Outcome  goal met, goal revised this date at 07/21/2020 0731   UB Dressing Goal 2   [place]  modified independence at 07/15/2020 0740   Time Frame  long-term goal (LTG), 2 weeks at 07/15/2020 0740   Progress/Outcome  goal ongoing at 07/21/2020 0731   LB Dressing Goal 1   [place]  supervision required at 07/21/2020 0731   Time Frame  short-term goal (STG), 5 - 7 days at 07/21/2020 0731   Progress/Outcome  goal met, goal revised this date at 07/21/2020 0731   LB Dressing Goal 2   [place]  modified independence at 07/15/2020 0740   Time Frame  long-term goal (LTG), 2 weeks at 07/15/2020 0740   Progress/Outcome  goal ongoing at 07/21/2020 0731   Grooming Goal 1   [place]  supervision required at 07/15/2020 0740   Time Frame  short-term goal (STG), 5 - 7 days at 07/21/2020 0731   Strategies/Barriers  in stance at 07/15/2020 0740   Progress/Outcome  goal not met, goal ongoing at 07/21/2020 0731   Grooming Goal 2   [place]  modified independence at 07/15/2020 0740   Time Frame  long-term goal (LTG), 2 weeks at 07/15/2020 0740   Strategies/Barriers  in stance at 07/15/2020 0740   Progress/Outcome  goal ongoing at 07/21/2020 0731   Toileting Goal 1   [place]  supervision required at 07/21/2020 0731   Time Frame  short-term goal (STG), 5 - 7 days at 07/21/2020 0731    Progress/Outcome  goal met, goal revised this date at 07/21/2020 0731   Toileting Goal 2   [place]  modified independence at 07/15/2020 0740   Time Frame  long-term goal (LTG), 2 weeks at 07/15/2020 0740   Progress/Outcome  goal ongoing at 07/21/2020 0731

## 2020-07-22 NOTE — PLAN OF CARE
Problem: Rehabilitation (IRF) Plan of Care  Goal: Plan of Care Review  Flowsheets (Taken 7/22/2020 1700)  Plan of Care Reviewed With: patient  IRF Plan of Care Review: progress ongoing, continue  Outcome Summary: Pt seen for brace rounds this session. Ambulated 250'x4 using RW with supervision prior to brace rounds to determine fatigue point of pt's RLE. Aly Serrano and AC PT Barbara Hill observed pt's ambulation without an orthotic and with a noodle brace, noting no significant change. Pt does demonstrate decreased heel strike BLE, without toe catching present with increased distances. Determined bracing needs are not warranted at this time and to be reassessed as appropriate in future outpatient therapy. Pt expressed agreement with recommendation and no further questions. Additionally, pt demonstrates clinically significant improvement in the BBS and TUG with scores changing from 24 to 34/56 and 19.7 to 16.4 sec. Pt continues to be at fall risk and demonstrates need for continued use of a RW for ambulation.

## 2020-07-22 NOTE — PROGRESS NOTES
Patient: Jael Herman  Location: [facility] [address]  MRN: [id number]  Today's date: 7/22/2020    History of Present Illness  Jael Herman is a 70 y.o. male whose primary indication for inpatient rehabilitation is Spinal Cord, Non-Traumatic.  Pertinent History of Current Functional Problem:  He has a history of Spinal stenosis of lumbar region with neurogenic claudication [M48.062] and was admitted for a lumbar spinal fusion. Underwent L3-5 PLDF (posterior lumbar decompression, L4-5 posterior lumbar fusion with instrumentation.  by Dr. Gregorio Lancaster 7/6/20 at [facility]. Post op he had anemia but did not require transfusion. His pain was managed with Tylenol, Neurontin, Flexeril and Dilaudid.  He was put on SQ Heparin for DVT ppx. The patient was seen by PM&R and found to have residual deficits in ambulation and ADLs due to S/P lumbar spinal fusion [Z98.1] and was transferred to [facility] on 7/14/2020 for acute inpatient rehab.      Past Medical History  Jael has a past medical history of BPH (benign prostatic hyperplasia), DJD (degenerative joint disease), Hypertension, Lumbar stenosis, and BEN (obstructive sleep apnea).    PT Vitals    Date/Time Pulse HR Source Resp SpO2 Pt Activity O2 Therapy BP BP Location BP Method Pt Position [facility]   07/22/20 1330 108 Monitor -- -- -- -- 123/62 Right upper arm Automatic Sitting [facility]   07/22/20 1455 115 Monitor -- -- -- -- 116/72 Right upper arm Automatic Sitting BG                    PT Pain    Date/Time Pain Type Pref Pain Scale Location Rating: Rest Interventions [facility]   07/22/20 1330 Pain Assessment number (Numeric Rating Pain Scale) back 4 premedicated for activity [facility]   07/22/20 1455 Pain Reassessment number (Numeric Rating Pain Scale) back 4 position adjusted [facility]          Prior Living Environment      Most Recent Value   Lives With  spouse   Living Arrangements  house   Home Accessibility  stairs to enter home (Group)   Number  of Stairs, Main Entrance  4   Surface of Stairs, Main Entrance  concrete   Stair Railings, Main Entrance  railing on left side (ascending)   Landing, Stairs, Main Entrance  railings present   Stairs Comment, Main Entrance  side or front entrance   Location, Kitchen  first (main) floor   Kitchen Access Comment  pt completes IADLs at home   Location, Patient Bedroom  second floor, must negotiate stairs to access   Patient Bedroom Access Comment  has bed on first floor   Location, Bathroom  first (main) floor, second floor, must negotiate stairs to access   Bathroom Access Comment  1st floor powder room, tub shower, wife has shower bench, renovating master bedroom closet into bathroom, standard toilets, no grab bars   Number of Stairs, Within Home, Secondary  other (see comments)   Stairs Comment, Within Home, Secondary  has stair lift for wife          Prior Level of Function      Most Recent Value   Dominant Hand  right   Ambulation  independent   Transferring  independent   Toileting  independent   Bathing  independent   Dressing  independent   Eating  independent   Equipment Currently Used at Home  none          IRF PT Evaluation and Treatment - 07/22/20 1330        Time Calculation    Start Time  1330     Stop Time  1500     Time Calculation (min)  90 min        Session Details    Document Type  daily treatment/progress note     Mode of Treatment  physical therapy;individual therapy        General Information    Patient Profile Reviewed?  yes     General Observations of Patient  pt received in therapy gym without complaints     Existing Precautions/Restrictions  cardiac;fall;spinal        Sit to Stand Transfer    [place], Sit to Stand Transfer  supervision     Verbal Cues  safety     Assistive Device  walker, front-wheeled     Comment  from         Stand to Sit Transfer    [place], Stand to Sit Transfer  supervision     Verbal Cues  safety     Assistive Device  walker, front-wheeled     Comment  to          Stand Pivot Transfer    [place], Stand Pivot/Stand Step Transfer  supervision     Verbal Cues  safety     Assistive Device  walker, front-wheeled     Comment  ambulatory approach        Gait Training    [place], Gait  supervision     Assistive Device  walker, front-wheeled     Distance in Feet  250 feet     Gait Pattern Utilized  step-through     Deviations/Abnormal Patterns (Gait)  gait speed decreased;stride length decreased    forward flexed posture    Bilateral Gait Deviations  heel strike decreased     Right Sided Gait Deviations  heel strike decreased     Comment  x4 trials prior to brace rounds. additional trials during brace rounds with and without orthotic to determine bracing needs        Stairs Training    [place], Stairs  supervision     Assistive Device  railing;cane, straight     Handrail Location  left side (ascending);right side (descending)     Number of Stairs  8     Stair Height  6 inches     Ascending Stairs Technique  step-to-step     Descending Stairs Technique  step-to-step     Comment  completed stairs with increased independence using SPC and single L HR to mimic home setup        Burrell Balance Scale    Sitting to Standing  Able to stand without using hands and stabilize independently     Standing Unsupported  Able to stand safely for 2 minutes     Sitting with Back Unsupported but Feet Supported on Floor or on a Stool  Able to sit safely and securely for 2 minutes     Standing to Sitting  Sits safely with minimal use of hands     Transfers  Able to transfer with verbal cueing and/or supervision     Standing Unsupported with Eyes Closed  Able to stand 10 seconds with supervision     Standing Unsupported with Feet Together  Able to place feet together independently and stand 1 minute safely     Reach Forward with Outstretched Arm While Standing  Reaches forward but needs supervision      Object from Floor from a Standing Position  Unable to try/needs assist to keep  from losing balance or falling    unsafe to attempt due to spinal precautions    Turning to Look Behind Over Left and Right Shoulders While Standing  Needs assist to keep from losing balance or falling    unsafe to assess due to spinal precautions    Turn 360 Degrees  Needs close supervision or verbal cueing     Place Alternate Foot on Step or Stool While Standing Unsupported  Able to complete greater than 2 steps needs minimal assist     Standing Unsupported One Foot in Front  Able to place foot ahead independently and hold 30 seconds     Standing on One Leg  Able to lift leg independently and hold 5-10 seconds     Burrell Balance Score  34        Timed Up and Go Test    Trial One: Timed Up and Go Test  16.95     Trial Two: Timed Up and Go Test  15.72     Trial Three: Timed Up and Go Test  16.73     Mean of 3 Trials: Timed Up and Go Test  [id number]        Daily Progress Summary (PT)    Daily Outcome Statement (PT)  Pt seen for brace rounds this session. Ambulated 250'x4 using RW with supervision prior to brace rounds to determine fatigue point of pt's RLE. Aly Serrano and AC PT Barbara Hill observed pt's ambulation without an orthotic and with a noodle brace, noting no significant change. Pt does demonstrate decreased heel strike BLE, without toe catching present with increased distances. Determined bracing needs are not warranted at this time and to be reassessed as appropriate in future outpatient therapy. Pt expressed agreement with recommendation and no further questions. Additionally, pt demonstrates clinically significant improvement in the BBS and TUG with scores changing from 24 to 34/56 and 19.7 to 16.4 sec. Pt continues to be at fall risk and demonstrates need for continued use of a RW for ambulation.     Recommendations  plan for d/c evaluation tomorrow 7/23                            IRF PT Goals      Most Recent Value   Bed Mobility Goal 1   Activity/Assistive Device  bed mobility activities,  all at 07/15/2020 1035   [place]  supervision required, verbal cues required at 07/15/2020 1035   Time Frame  short-term goal (STG), 5 - 7 days at 07/15/2020 1035   Progress/Outcome  goal ongoing at 07/21/2020 0821   Bed Mobility Goal 2   Activity/Assistive Device  bed mobility activities, all at 07/15/2020 1035   [place]  modified independence at 07/15/2020 1035   Time Frame  long-term goal (LTG), 14 days or less at 07/15/2020 1035   Progress/Outcome  goal ongoing at 07/21/2020 0821   Transfer Goal 1   Activity/Assistive Device  sit-to-stand/stand-to-sit, bed-to-chair/chair-to-bed, stand pivot, car transfer at 07/15/2020 1035   [place]  supervision required, verbal cues required at 07/15/2020 1035   Time Frame  short-term goal (STG), 5 - 7 days at 07/15/2020 1035   Progress/Outcome  goal ongoing at 07/21/2020 0821   Transfer Goal 2   Activity/Assistive Device  sit-to-stand/stand-to-sit, bed-to-chair/chair-to-bed, stand pivot, car transfer at 07/15/2020 1035   [place]  modified independence at 07/15/2020 1035   Time Frame  long-term goal (LTG), 14 days or less at 07/15/2020 1035   Progress/Outcome  goal ongoing at 07/21/2020 0821   Gait/Walking Locomotion Goal 1   Activity/Assistive Device  gait (walking locomotion), diminish gait deviation, increase endurance/gait distance, improve balance and speed at 07/15/2020 1035   Distance  150 feet at 07/15/2020 1035   [place]  supervision required, verbal cues required at 07/15/2020 1035   Time Frame  short-term goal (STG), 5 - 7 days at 07/15/2020 1035   Progress/Outcome  goal ongoing at 07/21/2020 0821   Gait/Walking Locomotion Goal 2   Activity/Assistive Device  gait (walking locomotion), diminish gait deviation, increase endurance/gait distance, improve balance and speed at 07/15/2020 1035   Distance  250 feet at 07/15/2020 1035   [place]  modified independence at 07/15/2020 1035   Time Frame  long-term goal (LTG), 14 days or less at  07/15/2020 1035   Progress/Outcome  goal ongoing at 07/21/2020 0821   Stairs Goal 1   Activity/Assistive Device  stairs, all skills, using handrail, left, using handrail, right at 07/15/2020 1035   Number of Stairs  12 at 07/15/2020 1035   [place]  supervision required, verbal cues required at 07/15/2020 1035   Time Frame  short-term goal (STG), 5 - 7 days at 07/15/2020 1035   Progress/Outcome  goal ongoing at 07/21/2020 0821   Stairs Goal 2   Activity/Assistive Device  stairs, all skills [using 1 railing.] at 07/15/2020 1035   Number of Stairs  12 at 07/15/2020 1035   [place]  modified independence at 07/15/2020 1035   Time Frame  long-term goal (LTG), 14 days or less at 07/15/2020 1035   Progress/Outcome  goal ongoing at 07/21/2020 0821   Problem Specific Goal 1   Problem-Specific Goal 1  Pt. will demonstrate an improvement in standing balance as evidenced by a Burrell Balance Test score of > than or = to 32/56. at 07/15/2020 1035   Time Frame  short-term goal (STG), 5 - 7 days at 07/15/2020 1035   Problem Specific Goal 2   Problem-Specific Goal 2  Pt. will demonstrate an improvement in standing balance as evidenced by a Burrell Balance Test score of > than or = to 41/56, indicating a low falls risk. at 07/15/2020 1035   Time Frame  long-term goal (LTG), 14 days or less at 07/15/2020 1035

## 2020-07-22 NOTE — PROGRESS NOTES
Patient: Jael Herman  Location: [facility] [address]  MRN: [id number]  Today's date: 7/22/2020    History of Present Illness  Jael Herman is a 70 y.o. male whose primary indication for inpatient rehabilitation is Spinal Cord, Non-Traumatic.  Pertinent History of Current Functional Problem:  He has a history of Spinal stenosis of lumbar region with neurogenic claudication [M48.062] and was admitted for a lumbar spinal fusion. Underwent L3-5 PLDF (posterior lumbar decompression, L4-5 posterior lumbar fusion with instrumentation.  by Dr. Gregorio Lancaster 7/6/20 at [facility]. Post op he had anemia but did not require transfusion. His pain was managed with Tylenol, Neurontin, Flexeril and Dilaudid.  He was put on SQ Heparin for DVT ppx. The patient was seen by PM&R and found to have residual deficits in ambulation and ADLs due to S/P lumbar spinal fusion [Z98.1] and was transferred to [facility] on 7/14/2020 for acute inpatient rehab.      Past Medical History  Jael has a past medical history of BPH (benign prostatic hyperplasia), DJD (degenerative joint disease), Hypertension, Lumbar stenosis, and BEN (obstructive sleep apnea).    OT Vitals    Date/Time Pulse HR Source BP BP Location BP Method Pt Position [facility]   07/22/20 0934 109 Monitor 104/60 Left upper arm Automatic Sitting    07/22/20 0957 103 Monitor 108/63 Left upper arm Automatic Sitting       OT Pain    Date/Time Pain Type Pref Pain Scale Location Rating: Rest Interventions [facility]   07/22/20 0934 Pain Assessment number (Numeric Rating Pain Scale) back 4 position adjusted    07/22/20 0957 Pain Reassessment number (Numeric Rating Pain Scale) back 4 position adjusted           Prior Living Environment      Most Recent Value   Lives With  spouse   Living Arrangements  house   Home Accessibility  stairs to enter home (Group)   Number of Stairs, Main Entrance  4   Surface of Stairs, Main Entrance  concrete   Stair  Railings, Main Entrance  railing on left side (ascending)   Landing, Stairs, Main Entrance  railings present   Stairs Comment, Main Entrance  side or front entrance   Location, Kitchen  first (main) floor   Kitchen Access Comment  pt completes IADLs at home   Location, Patient Bedroom  second floor, must negotiate stairs to access   Patient Bedroom Access Comment  has bed on first floor   Location, Bathroom  first (main) floor, second floor, must negotiate stairs to access   Bathroom Access Comment  1st floor powder room, tub shower, wife has shower bench, renovating master bedroom closet into bathroom, standard toilets, no grab bars   Number of Stairs, Within Home, Secondary  other (see comments)   Stairs Comment, Within Home, Secondary  has stair lift for wife          Prior Level of Function      Most Recent Value   Dominant Hand  right   Ambulation  independent   Transferring  independent   Toileting  independent   Bathing  independent   Dressing  independent   Eating  independent   Equipment Currently Used at Home  none          IRF OT Evaluation and Treatment - 07/22/20 0933        Time Calculation    Start Time  0930     Stop Time  1000     Time Calculation (min)  30 min        Session Details    Document Type  daily treatment/progress note     Mode of Treatment  individual therapy;occupational therapy        General Information    General Observations of Patient  Pt received direct hand off from prior OT session, agreeable to session        Bed Mobility    Comment (Bed Mobility)  Bed mobility on standard twin bed in simulated living environment. Sitting EOB <> supine via log roll technique c Cl S for safety and min VC for technique.        Transfers    Transfers  stand pivot transfer;toilet transfer;tub transfer     Comment  Functional transfers in the simulated living environment. LSO donned.        Stand Pivot Transfer    [place], Stand Pivot/Stand Step Transfer  supervision     Verbal Cues  safety      Assistive Device  walker, front-wheeled     Comment  via ambulatory approach c RW to/from w/c, standard chair, and EOB        Toilet Transfer    Transfer Technique  stand pivot     [place], Toilet Transfer  close supervision;supervision     Verbal Cues  safety     Assistive Device  commode, 3-in-1;walker, front-wheeled     Comment  via ambulatory approach c RW to/from commode over toilet in the simulated living environment        Tub Transfer    Transfer Technique  sit and swing     [place], Tub Transfer  close supervision     Verbal Cues  safety     Assistive Device  tub bench;walker, front-wheeled     Comment  via ambulatory approach c RW to/from tub bench c min VC for technique in the simulated living environment        Safety Issues, Functional Mobility    Comment, Safety Issues/Impairments (Mobility)  OT: HHM c RW short HHD in the simulated living environment while completing functional transfers c Cl S to S for safety.        Orthotic Management    Orthosis Location  spinal orthosis        Spinal Orthosis Management    Type (Spinal Orthosis)  LSO (lumbar sacral orthosis)     Fabrication Comment (Spinal Orthosis)  LSO donned throughout session        Daily Progress Summary (OT)    Daily Outcome Statement (OT)  Pt tolerating session c focus on functional transfers via ambulatory approach c RW in the simulated living environment. Toilet transfer and SPT completed c S, and tub transfer c tub bench via sit and swing technique completed c Cl S. Continue OT.                       Education provided this session. See the Patient Education summary report for full details.    IRF OT Goals      Most Recent Value   Transfer Goal 1   Activity/Assistive Device  toilet at 07/15/2020 0740   [place]  modified independence at 07/21/2020 0731   Time Frame  short-term goal (STG), 5 - 7 days at 07/21/2020 0731   Progress/Outcome  goal met, goal revised this date at 07/21/2020 0731   Transfer Goal 2    Activity/Assistive Device  toilet at 07/15/2020 0740   [place]  modified independence at 07/15/2020 0740   Time Frame  long-term goal (LTG), 2 weeks at 07/15/2020 0740   Progress/Outcome  goal ongoing at 07/21/2020 0731   Transfer Goal 3   Activity/Assistive Device  shower at 07/15/2020 0740   [place]  set-up required, supervision required at 07/15/2020 0740   Time Frame  short-term goal (STG), 5 - 7 days at 07/21/2020 0731   Progress/Outcome  goal not met, goal ongoing at 07/21/2020 0731   Transfer Goal 4   Activity/Assistive Device  shower at 07/15/2020 0740   [place]  set-up required, supervision required at 07/15/2020 0740   Time Frame  long-term goal (LTG), 2 weeks at 07/15/2020 0740   Progress/Outcome  goal ongoing at 07/21/2020 0731   Bathing Goal 1   [place]  supervision required at 07/21/2020 0731   Time Frame  short-term goal (STG), 5 - 7 days at 07/21/2020 0731   Progress/Outcome  goal met, goal revised this date at 07/21/2020 0731   Bathing Goal 2   [place]  supervision required, set-up required at 07/15/2020 0740   Time Frame  long-term goal (LTG), 14 days or less at 07/21/2020 0731   UB Dressing Goal 1   [place]  supervision required at 07/21/2020 0731   Time Frame  short-term goal (STG), 5 - 7 days at 07/21/2020 0731   Progress/Outcome  goal met, goal revised this date at 07/21/2020 0731   UB Dressing Goal 2   [place]  modified independence at 07/15/2020 0740   Time Frame  long-term goal (LTG), 2 weeks at 07/15/2020 0740   Progress/Outcome  goal ongoing at 07/21/2020 0731   LB Dressing Goal 1   [place]  supervision required at 07/21/2020 0731   Time Frame  short-term goal (STG), 5 - 7 days at 07/21/2020 0731   Progress/Outcome  goal met, goal revised this date at 07/21/2020 0731   LB Dressing Goal 2   [place]  modified independence at 07/15/2020 0740   Time Frame  long-term goal (LTG), 2 weeks at 07/15/2020 0740   Progress/Outcome  goal ongoing at  07/21/2020 0731   Grooming Goal 1   [place]  supervision required at 07/15/2020 0740   Time Frame  short-term goal (STG), 5 - 7 days at 07/21/2020 0731   Strategies/Barriers  in stance at 07/15/2020 0740   Progress/Outcome  goal not met, goal ongoing at 07/21/2020 0731   Grooming Goal 2   [place]  modified independence at 07/15/2020 0740   Time Frame  long-term goal (LTG), 2 weeks at 07/15/2020 0740   Strategies/Barriers  in stance at 07/15/2020 0740   Progress/Outcome  goal ongoing at 07/21/2020 0731   Toileting Goal 1   [place]  supervision required at 07/21/2020 0731   Time Frame  short-term goal (STG), 5 - 7 days at 07/21/2020 0731   Progress/Outcome  goal met, goal revised this date at 07/21/2020 0731   Toileting Goal 2   [place]  modified independence at 07/15/2020 0740   Time Frame  long-term goal (LTG), 2 weeks at 07/15/2020 0740   Progress/Outcome  goal ongoing at 07/21/2020 0731

## 2020-07-22 NOTE — PLAN OF CARE
Problem: Rehabilitation (IRF) Plan of Care  Goal: Plan of Care Review  Outcome: Progressing  Flowsheets (Taken 7/22/2020 0320)  Plan of Care Reviewed With: patient  IRF Plan of Care Review: progress ongoing, continue  Outcome Summary: pt presented calm and stable, aaox3 and able to clearly communicate his needs, denies all pain symptoms, continent of b-b, no apparent distress observed, pt slept well.

## 2020-07-22 NOTE — PLAN OF CARE
Problem: Rehabilitation (IRF) Plan of Care  Goal: Plan of Care Review  Flowsheets (Taken 7/22/2020 6498)  Plan of Care Reviewed With: patient  IRF Plan of Care Review: progress ongoing, continue  Outcome Summary: Pt tolerating session c focus on functional transfers via ambulatory approach c RW in the simulated living environment. Toilet transfer and SPT completed c S, and tub transfer c tub bench via sit and swing technique completed c Cl S. Continue OT.

## 2020-07-22 NOTE — PROGRESS NOTES
Subjective    Patient seen and examined on rounds.  Chart reviewed.  Events overnight noted.  History reviewed briefly with patient.    CC:  Deficits in mobility, transfers, self-care status post posterior lumbar decompression and fusion, lumbar stenosis, lumbar radiculopathy    HPI:  Mr. Jael Herman  is a 70-year-old right-handed -American gentleman with chronic conditions significant for hypertension, hyperlipidemia, benign process hypertrophy, sleep apnea, lumbar stenosis who had chronic low back pain for over 20 years and pain extending down his lower extremities especially right lower extremity with progressive weakness in his right lower extremity over the past year and he says he was noted to have a right foot drop.  He was diagnosed with lumbar spinal stenosis and underwent L3-5 PLDF by Dr. Gregorio Lancaster on 7/6/20 at [facility].  Postoperatively he is allowed weightbearing as tolerated on both lower extremities.  He was recommended a LSO brace for use when out of bed.  He did have urinary frequency postoperatively.  Drains were removed on 7/12/20.  Postoperative course was significant for abdominal pain and he reports multiple imaging studies were done of his abdomen and he also had barium follow-through.  He was seen by surgical team for his abdominal pain.  He reports abdominal x-rays were negative for obstruction.  Amylase, lipase were not elevated.  He says that when he takes Gabapentin he gets abdominal pain and he noticed that even before his surgery.  We will hold that for now.  Also he will be kept on Protonix/Pepcid.  He is on subcutaneous Heparin for DVT prophylaxis.  He is also on Dilaudid and Flexeril for pain control. He has been kept on bowel medications for his constipation issues.  He still reports ongoing abdominal pain and I mentioned to him that we will get another x-ray of his abdomen tomorrow.  If his abdominal pain gets worse, he may need to go to the  "ER for evaluation. On 7/10/20, hemoglobin was 10.1, WBC count 8.3, BUN 15, creatinine 0.9.  He has been needing assistance for mobility, transfers, self-care postoperatively. He is transferred to [place] [facility] on 7/14/20 for further rehabilitation care.    SUBJ:  Pain controlled.  He wants to go home o Friday. We reviewed d/c planning.  He will be eval'd in brace rounds later today for R AFO.  He was advised about not being cleared to resume driving yet.  He is scheduled for suture removal with surgeon Dr. Lancaster on Thursday 1pm.  Denies any fever, chills, sweat, abd discomfort, new numbness, tingling or weakness.    ROS: Denies chest pain or shortness of breath. Other ROS negative. Past, family, social history is unchanged.      Physical Exam      Blood pressure 104/60, pulse (!) 109, temperature 36.4 °C (97.6 °F), temperature source Oral, resp. rate 18, height 1.803 m (5' 11\"), weight 81.6 kg (180 lb), SpO2 96 %.  Body mass index is 25.1 kg/m².      Physical Exam   Constitutional: He is oriented to person, place, and time. Vital signs are normal. He appears well-developed and well-nourished.   HENT:   Head: Normocephalic and atraumatic.   Eyes: Pupils are equal, round, and reactive to light. Conjunctivae and EOM are normal.   Cardiovascular: Normal rate and regular rhythm.   Pulmonary/Chest: Effort normal and breath sounds normal.   Abdominal: Soft. Normal appearance and bowel sounds are normal.   Genitourinary:   Genitourinary Comments: No brand catheter   Musculoskeletal: Normal range of motion.   No jt erythema, warmth or effusion   Neurological: He is alert and oriented to person, place, and time. He displays no tremor. No cranial nerve deficit. He exhibits normal muscle tone. He displays no seizure activity. Gait abnormal. Coordination normal.   Fluent, follows commands, strength >3/5 bilat UEs and LLE; strength RLE DF 2-3/5 and PF 3/5, tone 0/4.   Skin: Skin is warm, dry and intact.      "   Psychiatric: He has a normal mood and affect. His speech is normal and behavior is normal.         Current Facility-Administered Medications:   •  acetaminophen (TYLENOL) tablet 650 mg, 650 mg, oral, q4h PRN, Fredis Lyles MD  •  albuterol HFA (VENTOLIN HFA) 90 mcg/actuation inhaler 2 puff, 2 puff, inhalation, q6h PRN, Fredis Lyles MD  •  alum-mag hydroxide-simeth (MAALOX) 200-200-20 mg/5 mL suspension 30 mL, 30 mL, oral, q6h PRN, Fredis Lyles MD, 30 mL at 07/21/20 0217  •  bisacodyL (DULCOLAX) 10 mg suppository 10 mg, 10 mg, rectal, Daily PRN, Fredis Lyles MD  •  calcium carbonate (TUMS) chewable tablet 1,000 mg, 1,000 mg, oral, 3x daily PRN, Fredis Lyles MD  •  cholecalciferol (vitamin D3) tablet 1,000 Units, 1,000 Units, oral, Daily, Fredis Lyles MD, 1,000 Units at 07/21/20 0831  •  cyanocobalamin (VITAMIN B12) tablet 1,000 mcg, 1,000 mcg, oral, Daily, Fredis Lyles MD, 1,000 mcg at 07/21/20 0831  •  cyclobenzaprine (FLEXERIL) tablet 10 mg, 10 mg, oral, 3x daily PRN, Fredis Lyles MD  •  famotidine (PEPCID) tablet 20 mg, 20 mg, oral, BID, Fredis Lyles MD, 20 mg at 07/21/20 2116  •  heparin (porcine) 5,000 unit/mL injection 5,000 Units, 5,000 Units, subcutaneous, q8h CARRIE, Fredis Lyles MD, 5,000 Units at 07/22/20 0544  •  HYDROmorphone (DILAUDID) tablet 2-4 mg, 2-4 mg, oral, q4h PRN, Fredis Lyles MD, 4 mg at 07/15/20 1242  •  losartan (COZAAR) tablet 50 mg, 50 mg, oral, Daily, Fredis Lyles MD, 50 mg at 07/20/20 0854  •  ondansetron ODT (ZOFRAN-ODT) disintegrating tablet 4 mg, 4 mg, oral, q8h PRN, [facility], MD Escobar, 4 mg at 07/16/20 1914  •  pantoprazole (PROTONIX) tablet,delayed release (DR/EC) 40 mg, 40 mg, oral, Daily before breakfast, Fredis Lyles MD, 40 mg at 07/21/20 0831  •  polyethylene glycol (MIRALAX) 17 gram packet 17 g, 17 g, oral, Daily, Fredis Lyles MD, 17 g at 07/21/20 0833  •  polyethylene glycol (MIRALAX)  17 gram packet 17 g, 17 g, oral, Daily PRN, Fredis Lyles MD  •  rosuvastatin (CRESTOR) tablet 5 mg, 5 mg, oral, Daily, Fredis Lyles MD, 5 mg at 07/21/20 0831  •  sennosides-docusate sodium (SENOKOT-S) 8.6-50 mg per tablet 1 tablet, 1 tablet, oral, BID, Fredis Lyles MD, 1 tablet at 07/21/20 2117  •  sucralfate (CARAFATE) tablet 1 g, 1 g, oral, q6h [facility], Jimmy Adame, DO, 1 g at 07/22/20 0545  •  tamsulosin (FLOMAX) 24 hr ER capsule 0.4 mg, 0.4 mg, oral, Nightly, Fredis Lyles MD, 0.4 mg at 07/21/20 2116       Labs / Radiology    Lab Results   Component Value Date    WBC 7.48 07/20/2020    HGB 12.3 (L) 07/20/2020    HCT 37.9 (L) 07/20/2020    MCV 94.8 07/20/2020     07/20/2020     Lab Results   Component Value Date    GLUCOSE 110 (H) 07/20/2020    CALCIUM 9.6 07/20/2020     07/20/2020    K 4.7 07/20/2020    CO2 25 07/20/2020     07/20/2020    BUN 20 07/20/2020    CREATININE 1.2 07/20/2020       Assessment and Plan    ASSESSMENT PLAN:  1. 70-year-old right-handed -American gentleman with chronic conditions significant for hypertension, hyperlipidemia, benign process hypertrophy, sleep apnea, lumbar stenosis who had chronic low back pain for over 20 years and pain extending down his lower extremities especially right lower extremity with progressive weakness in his right lower extremity over the past year and he says he was noted to have a right foot drop.  He was diagnosed with lumbar spinal stenosis and underwent L3-5 PLDF by Dr. Gregorio Lancaster on 7/6/20 at [facility].  Postoperatively he is allowed weightbearing as tolerated on both lower extremities.  He was recommended a LSO brace for use when out of bed.  He did have urinary frequency postoperatively.  Drains were removed on 7/12/20.  Postoperative course was significant for abdominal pain and he reports multiple imaging studies were done of his abdomen and he also had barium  follow-through.  He was seen by surgical team for his abdominal pain.  He reports abdominal x-rays were negative for obstruction.  Amylase, lipase were not elevated.  He says that when he takes Gabapentin he gets abdominal pain and he noticed that even before his surgery.  We will hold that for now.  Also he will be kept on Protonix/Pepcid.  He is on subcutaneous Heparin for DVT prophylaxis.  He is also on Dilaudid and Flexeril for pain control. He has been kept on bowel medications for his constipation issues.  He still reports ongoing abdominal pain and I mentioned to him that we will get another x-ray of his abdomen tomorrow.  If his abdominal pain gets worse, he may need to go to the ER for evaluation. On 7/10/20, hemoglobin was 10.1, WBC count 8.3, BUN 15, creatinine 0.9.  He has been needing assistance for mobility, transfers, self-care postoperatively. He is transferred to [place] rehabilitation on 7/14/20 for further rehabilitation care.     2. DVT prophylaxis - on Heparin.  On SCDs.  Platelets 385 on 7/15/2020.     3.  Lumbar stenosis - status post posterior lumbar decompression and fusion, lumbar stenosis, lumbar radiculopathy - continue PT, OT, psychology.  Monitor healing of lumbar incision.  He has chronic right foot drop.     4. GI - On Protonix for GI prophylaxis. On Pepcid.  MiraLAX, Senokot-S.  On PRN Dulcolax.  On PRN Maalox.  On Tums when necessary.  On PRN MiraLAX.  We will get abdominal x-ray tomorrow due to ongoing abdominal pain.Discussed results of abdominal x-ray with him.  He had some left upper quadrant pain today while in therapy and had to return back to room.  Discussed with him we can send him to [place] ER for evaluation but he wants to hold off.  He wants to wait another day and see how he feels tomorrow and if he is any worse he will go to the ER for evaluation for further work-up.  Discussed with nurse.  Discussed with patient that if he feels worse in the evening or at nighttime  house physician can evaluate him and send him to [facility] for evaluation if needed.He reports abdominal pain is less today.  He does not feel the need to go to the ER.  Progressing in therapy.  He reports decreased abdominal pain today.  He reports he will take MiraLAX tonight as he has some constipation.     5.  - voiding.  Denies dysuria.  Monitor post void residuals.  He has some urinary frequency.  On Flomax.     6.  Hypertension -  on Cozaar.     7. Pain - on when necessary Dilaudid.  On Flexeril PRN.  On Tylenol PRN.  Gabapentin was discontinued as patient says it causes abdominal pain for him.  He noticed this even prior to his surgery per patient.     8. Hematology -hemoglobin 11.8, WBC 8.99 on 11/15/2020.     9.  Hyperlipidemia - on Crestor.      10. F/E/N - on vitamin D.     11. Rehabilitation medicine - Continue comprehensive rehabilitation care. Continue PT, OT, speech, psychology.  We will follow falls precautions, cardiac precautions, monitor pulse oximetry in therapy and follow aspiration precautions.  We will follow spinal precautions.He reports he still had some abdominal pain today but is decreasing.  Abdominal x-rays were ordered.  Tolerating diet.Discussed results of abdominal x-ray with him.  He had some left upper quadrant pain today while in therapy and had to return back to room.  Discussed with him we can send him to [facility] for evaluation but he wants to hold off.  He wants to wait another day and see how he feels tomorrow and if he is any worse he will go to the ER for evaluation for further work-up.  Discussed with nurse.  Discussed with patient that if he feels worse in the evening or at nighttime house physician can evaluate him and send him to [facility] for evaluation if needed.He reports abdominal pain is less today.  He does not feel the need to go to the ER.  Progressing in therapy.  He is ambulating up to 150 feet in therapy close supervision to min assist.  Working with  occupational therapy.  He reports he will take MiraLAX tonight as he has some constipation.Progressing in therapy.  Decreased abdominal pain per patient.  Able to walk better in therapy. Ambulating well in physical therapy.  He denies much abdominal pain today.  He indicates he has follow-up appointment at his surgeon's office on Thursday 7/23 at 1:15 PM.  Order written to that effect.  Discussed with nursing.  He says he is hoping to be discharged by next weekend.  Discussed with him he will be team next week.  Will eval in brace rounds regarding AFO RLE to assist with DF weakness.     12.  Mild anemia    Dispo: home with home on 7/24    Jimmy Adame, DO  7/22/2020

## 2020-07-22 NOTE — PROGRESS NOTES
[facility] Progress Note          Patient was seen and examined.   Attestation Notes: Face to face encounter completed    Jael Herman is a 70 y.o. male who was admitted for S/P lumbar spinal fusion [Z98.1]. Patient was referred by Anoop Yeh MD for medical assessment and management      CC: S/P lumbar spinal fusion [Z98.1]     HPI: Jael Herman is a 70 y.o. male with HTN, HL, BPH, BEN (no CPAP), and lumbar stenosis who underwent L3-5 PLDF by Dr. Gregorio Lancaster 7/6/20 at [facility]. Post op he had anemia but did not require transfusion. His pain was managed with Tylenol, Neurontin, Flexeril and Dilaudid.  He was put on SQ Heparin for DVT ppx.      He remained on Losartan for HTN and Crestor for HL. He was also on Flomax for BPH.      He had some post op abdominal discomfort and nausea. Xrays negative for obstruction. Amylase and lipase were not elevated. He was treated with Senokot, Colace, Miralax and Dulcolax suppositories for constipation.      The patient was seen by PM&R and found to have residual deficits in ambulation and ADLs due to S/P lumbar spinal fusion [Z98.1] and came to [facility] on 7/14/2020 for acute inpatient rehab.     He participated in therapy.     SUBJECTIVE:  Patient interviewed and examined    He is happy to be going home Friday.    Improving LLQ pain, no nausea     Back pain remains stable, no new weakness or numbness, no dysuria, no chest pain, palpitations, dyspnea or fevers    Review of Systems:  All other systems reviewed and negative except as noted in the HPI.    Current meds and allergies reviewed    Past Medical History:   Diagnosis Date   • BPH (benign prostatic hyperplasia)    • DJD (degenerative joint disease)    • Hypertension    • Lumbar stenosis    • BEN (obstructive sleep apnea)      Past Surgical History:   Procedure Laterality Date   • POSTERIOR FUSION LUMBAR SPINE  07/06/2020    L3-5 PLDF     Social History      Tobacco Use   • Smoking status: Never Smoker   • Smokeless tobacco: Never Used   Substance Use Topics   • Alcohol use: Not Currently   • Drug use: Never      History reviewed. No pertinent family history.    Vital signs in last 24 hours:  Temp:  [36.4 °C (97.6 °F)-36.8 °C (98.3 °F)] 36.4 °C (97.6 °F)  Heart Rate:  [] 103  Resp:  [18] 18  BP: ()/(55-71) 108/63  Vital signs reviewed 07/22/20 12:41 PM    Physical Exam   Constitutional: He is oriented to person, place, and time. He appears well-developed and well-nourished.   HENT:   Head: Normocephalic and atraumatic.   Right Ear: External ear normal.   Left Ear: External ear normal.   Nose: Nose normal.   Mouth/Throat: Oropharynx is clear and moist.   Eyes: Pupils are equal, round, and reactive to light. Conjunctivae and EOM are normal.   Neck: Normal range of motion. Neck supple.   Cardiovascular: Normal rate, regular rhythm, normal heart sounds and intact distal pulses. Exam reveals no gallop and no friction rub.   No murmur heard.  Pulmonary/Chest: Effort normal and breath sounds normal. No stridor. No respiratory distress. He has no wheezes. He has no rales.   Abdominal: Soft. Bowel sounds are normal. He exhibits no distension. There is no tenderness.   Musculoskeletal: He exhibits edema and deformity (s/p lumbar fusion).   Neurological: He is alert and oriented to person, place, and time.   Skin: Skin is warm and dry.   Psychiatric: He has a normal mood and affect. His behavior is normal.   Nursing note and vitals reviewed.              Objective    Labs:  I have reviewed the patient's labs.  Significant abnormals are anemia.  Results from last 7 days   Lab Units 07/20/20  0705   WBC K/uL 7.48   HEMOGLOBIN g/dL 12.3*   HEMATOCRIT % 37.9*   PLATELETS K/uL 325     Results from last 7 days   Lab Units 07/20/20  0705   SODIUM mEQ/L 137   POTASSIUM mEQ/L 4.7   CHLORIDE mEQ/L 102   CO2 mEQ/L 25   BUN mg/dL 20   CREATININE mg/dL 1.2   CALCIUM mg/dL 9.6    ALBUMIN g/dL 3.8   BILIRUBIN TOTAL mg/dL 1.1   ALK PHOS IU/L 64   ALT IU/L 22   AST IU/L 17   GLUCOSE mg/dL 110*         Lab Results   Component Value Date    LIPASE 34 07/15/2020     Lab Results   Component Value Date    [id number] 112 (L) 07/15/2020       Imagin/15/20: obstruction series:  IMPRESSION:   1.  No acute cardiopulmonary disease.  2.  Possible mild ileus.    Cardiac studies:  20: ECG: Sinus tachycardia      ASSESSMENT/PLAN:    70 y.o. male  with HTN, HL, BPH, BEN (no CPAP), and lumbar stenosis who underwent L3-5 PLDF by Dr. Gregorio Lancaster 20 at [facility]     1. Neuro:  -lumbar stenosis s/p L3-5 PLDF  -Tylenol and Dilaudid prn pain  -has follow up with Dr. Gregorio Lancaster 20     2. Vasc:  -bilat LE edema  -SCDs and SQ Heparin for DVT ppx  -doppler US bilat LE's neg. 7/15/20     3. Heme:  -anemia due to blood loss, no iron due to GI distress  -B12 low, added oral B12  -follow CBC     4. Renal:  -increased risk of dehydration and electrolyte changes  -follow BMP, Mg     5. Gi:  -abdominal pain since surgery, repeated xrays without obstruction, seen by general surgery  -7/15/20 obstruction series with mild ileus  -7/15/20 amylase slightly elevated and lipase normal, not likely cause of discomfort  -Senokot-S and Miralax for bowels  -Protonix and Pepcid for GERD and ulcer ppx     6. Gu:  -BPH on Flomax  -no UTI or retention     7. Cardiac:  -tachycardia due to therapy, pain, anxiety  -20 ECG with sinus tach but otherwise normal  -HTN on Losartan  -watch for orthostatic hypotension  -use cardiac precautions in therapy     8. Pulm:  -BNE but no CPAP  -incentive spirometry for atelectasis     9. Derm:  -seen by [employer] for skin assessment     10. Nutrition:  -seen by Nutrition for assessment and education     11. Endo:  -HL on Crestor     12. Psych:  -seen by Psychology for support    13. Dispo:  -anticipated discharge 20     plan discussed with patient,  nurse, case management and Anoop Yeh MD Scott Sapperstein, MD  7/22/2020  12:41 PM

## 2020-07-23 ENCOUNTER — APPOINTMENT (INPATIENT)
Dept: PHYSICAL THERAPY | Facility: REHABILITATION | Age: 70
DRG: 560 | End: 2020-07-23
Payer: MEDICARE

## 2020-07-23 ENCOUNTER — APPOINTMENT (INPATIENT)
Dept: OCCUPATIONAL THERAPY | Facility: REHABILITATION | Age: 70
DRG: 560 | End: 2020-07-23
Payer: MEDICARE

## 2020-07-23 LAB
ATRIAL RATE: 110
P AXIS: 35
PR INTERVAL: 152
QRS DURATION: 66
QT INTERVAL: 324
QTC CALCULATION(BAZETT): 438
R AXIS: 38
T WAVE AXIS: 52
VENTRICULAR RATE: 110

## 2020-07-23 PROCEDURE — 97535 SELF CARE MNGMENT TRAINING: CPT | Mod: GO

## 2020-07-23 PROCEDURE — 63600000 HC DRUGS/DETAIL CODE: Performed by: HOSPITALIST

## 2020-07-23 PROCEDURE — 63700000 HC SELF-ADMINISTRABLE DRUG: Performed by: PHYSICAL MEDICINE & REHABILITATION

## 2020-07-23 PROCEDURE — 63700000 HC SELF-ADMINISTRABLE DRUG: Performed by: HOSPITALIST

## 2020-07-23 PROCEDURE — 97116 GAIT TRAINING THERAPY: CPT | Mod: GP

## 2020-07-23 PROCEDURE — 97110 THERAPEUTIC EXERCISES: CPT | Mod: GP

## 2020-07-23 PROCEDURE — 97530 THERAPEUTIC ACTIVITIES: CPT | Mod: GP

## 2020-07-23 PROCEDURE — 12800001 HC ROOM AND CARE SEMIPRIVATE REHAB-BRAIN INJ

## 2020-07-23 RX ORDER — AMOXICILLIN 250 MG
1 CAPSULE ORAL 2 TIMES DAILY PRN
Status: DISCONTINUED | OUTPATIENT
Start: 2020-07-23 | End: 2020-07-24 | Stop reason: HOSPADM

## 2020-07-23 RX ORDER — FAMOTIDINE 20 MG/1
20 TABLET, FILM COATED ORAL 2 TIMES DAILY
Qty: 60 TABLET | Refills: 0 | COMMUNITY
Start: 2020-07-23 | End: 2020-08-22

## 2020-07-23 RX ORDER — AMOXICILLIN 250 MG
1 CAPSULE ORAL 2 TIMES DAILY PRN
COMMUNITY
Start: 2020-07-23 | End: 2020-08-22

## 2020-07-23 RX ORDER — CHOLECALCIFEROL (VITAMIN D3) 25 MCG
1000 TABLET ORAL DAILY
Qty: 30 TABLET | Refills: 0 | COMMUNITY
Start: 2020-07-24 | End: 2020-08-23

## 2020-07-23 RX ORDER — POLYETHYLENE GLYCOL 3350 17 G/17G
17 POWDER, FOR SOLUTION ORAL DAILY PRN
Status: DISCONTINUED | OUTPATIENT
Start: 2020-07-23 | End: 2020-07-24 | Stop reason: HOSPADM

## 2020-07-23 RX ORDER — OMEPRAZOLE 20 MG/1
20 TABLET, DELAYED RELEASE ORAL
Qty: 30 TABLET | Refills: 0 | COMMUNITY
Start: 2020-07-23 | End: 2020-08-22

## 2020-07-23 RX ORDER — SUCRALFATE 1 G/1
1 TABLET ORAL EVERY 6 HOURS
Qty: 120 TABLET | Refills: 0 | Status: SHIPPED | OUTPATIENT
Start: 2020-07-23 | End: 2020-08-22

## 2020-07-23 RX ORDER — LANOLIN ALCOHOL/MO/W.PET/CERES
1000 CREAM (GRAM) TOPICAL DAILY
Qty: 30 TABLET | Refills: 0 | COMMUNITY
Start: 2020-07-24 | End: 2020-08-23

## 2020-07-23 RX ORDER — ACETAMINOPHEN 325 MG/1
650 TABLET ORAL EVERY 4 HOURS PRN
COMMUNITY
Start: 2020-07-23 | End: 2020-08-22

## 2020-07-23 RX ADMIN — SUCRALFATE 1 G: 1 TABLET ORAL at 05:09

## 2020-07-23 RX ADMIN — FAMOTIDINE 20 MG: 20 TABLET ORAL at 07:43

## 2020-07-23 RX ADMIN — SUCRALFATE 1 G: 1 TABLET ORAL at 23:08

## 2020-07-23 RX ADMIN — MELATONIN 1000 UNITS: at 07:43

## 2020-07-23 RX ADMIN — SUCRALFATE 1 G: 1 TABLET ORAL at 00:17

## 2020-07-23 RX ADMIN — HEPARIN SODIUM 5000 UNITS: 5000 INJECTION INTRAVENOUS; SUBCUTANEOUS at 05:09

## 2020-07-23 RX ADMIN — TAMSULOSIN HYDROCHLORIDE 0.4 MG: 0.4 CAPSULE ORAL at 21:12

## 2020-07-23 RX ADMIN — FAMOTIDINE 20 MG: 20 TABLET ORAL at 21:12

## 2020-07-23 RX ADMIN — ROSUVASTATIN CALCIUM 5 MG: 5 TABLET, FILM COATED ORAL at 07:43

## 2020-07-23 RX ADMIN — HEPARIN SODIUM 5000 UNITS: 5000 INJECTION INTRAVENOUS; SUBCUTANEOUS at 15:48

## 2020-07-23 RX ADMIN — HEPARIN SODIUM 5000 UNITS: 5000 INJECTION INTRAVENOUS; SUBCUTANEOUS at 23:08

## 2020-07-23 RX ADMIN — SUCRALFATE 1 G: 1 TABLET ORAL at 17:29

## 2020-07-23 RX ADMIN — SUCRALFATE 1 G: 1 TABLET ORAL at 11:35

## 2020-07-23 RX ADMIN — PANTOPRAZOLE SODIUM 40 MG: 40 TABLET, DELAYED RELEASE ORAL at 07:43

## 2020-07-23 RX ADMIN — CYANOCOBALAMIN TAB 1000 MCG 1000 MCG: 1000 TAB at 07:43

## 2020-07-23 NOTE — PROGRESS NOTES
[facility] Progress Note          Patient was seen and examined.   Attestation Notes: Face to face encounter completed    Jael Herman is a 70 y.o. male who was admitted for S/P lumbar spinal fusion [Z98.1]. Patient was referred by Anoop Yeh MD for medical assessment and management      CC: S/P lumbar spinal fusion [Z98.1]     HPI: Jael Herman is a 70 y.o. male with HTN, HL, BPH, BEN (no CPAP), and lumbar stenosis who underwent L3-5 PLDF by Dr. Gregorio Lancaster 7/6/20 at [facility]. Post op he had anemia but did not require transfusion. His pain was managed with Tylenol, Neurontin, Flexeril and Dilaudid.  He was put on SQ Heparin for DVT ppx.      He remained on Losartan for HTN and Crestor for HL. He was also on Flomax for BPH.      He had some post op abdominal discomfort and nausea. Xrays negative for obstruction. Amylase and lipase were not elevated. He was treated with Senokot, Colace, Miralax and Dulcolax suppositories for constipation.      The patient was seen by PM&R and found to have residual deficits in ambulation and ADLs due to S/P lumbar spinal fusion [Z98.1] and came to [facility] on 7/14/2020 for acute inpatient rehab.     He participated in therapy. He had follow up with Dr. Lancaster 7/23/20.    The patient has steadily improved in therapy and is for discharge home 07/24/20       SUBJECTIVE:  Patient interviewed and examined    He feels ready for discharge home tomorrow.     He has follow up with Dr. Lancaster today.    Improving LLQ pain, no nausea     Back pain remains stable, no new weakness or numbness, no dysuria, no chest pain, palpitations, dyspnea or fevers    Review of Systems:  All other systems reviewed and negative except as noted in the HPI.    Current meds and allergies reviewed    Past Medical History:   Diagnosis Date   • BPH (benign prostatic hyperplasia)    • DJD (degenerative joint disease)    • Hypertension    • Lumbar stenosis     • BEN (obstructive sleep apnea)      Past Surgical History:   Procedure Laterality Date   • POSTERIOR FUSION LUMBAR SPINE  07/06/2020    L3-5 PLDF     Social History     Tobacco Use   • Smoking status: Never Smoker   • Smokeless tobacco: Never Used   Substance Use Topics   • Alcohol use: Not Currently   • Drug use: Never      History reviewed. No pertinent family history.    Vital signs in last 24 hours:  Temp:  [36.4 °C (97.6 °F)-36.8 °C (98.2 °F)] 36.4 °C (97.6 °F)  Heart Rate:  [] 102  Resp:  [18] 18  BP: (103-123)/(55-72) 122/68  Vital signs reviewed 07/23/20 11:03 AM    Physical Exam   Constitutional: He is oriented to person, place, and time. He appears well-developed and well-nourished.   HENT:   Head: Normocephalic and atraumatic.   Right Ear: External ear normal.   Left Ear: External ear normal.   Nose: Nose normal.   Mouth/Throat: Oropharynx is clear and moist.   Eyes: Pupils are equal, round, and reactive to light. Conjunctivae and EOM are normal.   Neck: Normal range of motion. Neck supple.   Cardiovascular: Normal rate, regular rhythm, normal heart sounds and intact distal pulses. Exam reveals no gallop and no friction rub.   No murmur heard.  Pulmonary/Chest: Effort normal and breath sounds normal. No stridor. No respiratory distress. He has no wheezes. He has no rales.   Abdominal: Soft. Bowel sounds are normal. He exhibits no distension. There is no tenderness.   Musculoskeletal: He exhibits deformity (s/p lumbar fusion). He exhibits no edema.   Neurological: He is alert and oriented to person, place, and time.   Skin: Skin is warm and dry.   Psychiatric: He has a normal mood and affect. His behavior is normal.   Nursing note and vitals reviewed.              Objective    Labs:  I have reviewed the patient's labs.  Significant abnormals are anemia.  Results from last 7 days   Lab Units 07/20/20  0705   WBC K/uL 7.48   HEMOGLOBIN g/dL 12.3*   HEMATOCRIT % 37.9*   PLATELETS K/uL 325     Results  from last 7 days   Lab Units 20  0705   SODIUM mEQ/L 137   POTASSIUM mEQ/L 4.7   CHLORIDE mEQ/L 102   CO2 mEQ/L 25   BUN mg/dL 20   CREATININE mg/dL 1.2   CALCIUM mg/dL 9.6   ALBUMIN g/dL 3.8   BILIRUBIN TOTAL mg/dL 1.1   ALK PHOS IU/L 64   ALT IU/L 22   AST IU/L 17   GLUCOSE mg/dL 110*         Lab Results   Component Value Date    LIPASE 34 07/15/2020     Lab Results   Component Value Date    [id number] 112 (L) 07/15/2020       Imagin/15/20: obstruction series:  IMPRESSION:   1.  No acute cardiopulmonary disease.  2.  Possible mild ileus.    Cardiac studies:  20: ECG: Sinus tachycardia      ASSESSMENT/PLAN:    70 y.o. male  with HTN, HL, BPH, BEN (no CPAP), and lumbar stenosis who underwent L3-5 PLDF by Dr. Gregorio Lancaster 20 at [facility]     1. Neuro:  -lumbar stenosis s/p L3-5 PLDF  -Tylenol and Dilaudid prn pain  -has follow up with Dr. Gregorio Lancaster 20     2. Vasc:  -bilat LE edema  -SCDs and SQ Heparin for DVT ppx  -doppler US bilat LE's neg. 7/15/20     3. Heme:  -anemia due to blood loss, no iron due to GI distress  -B12 low, added oral B12  -follow CBC     4. Renal:  -increased risk of dehydration and electrolyte changes  -follow BMP, Mg     5. Gi:  -abdominal pain since surgery, repeated xrays without obstruction, seen by general surgery  -7/15/20 obstruction series with mild ileus  -7/15/20 amylase slightly elevated and lipase normal, not likely cause of discomfort  -Senokot-S and Miralax for bowels  -Protonix and Pepcid for GERD and ulcer ppx     6. Gu:  -BPH on Flomax  -no UTI or retention     7. Cardiac:  -tachycardia due to therapy, pain, anxiety  -20 ECG with sinus tach but otherwise normal  -HTN on Losartan  -watch for orthostatic hypotension  -use cardiac precautions in therapy     8. Pulm:  -BEN but no CPAP  -incentive spirometry for atelectasis     9. Derm:  -seen by [employer] for skin assessment     10. Nutrition:  -seen by Nutrition for  assessment and education     11. Endo:  -HL on Crestor     12. Psych:  -seen by Psychology for support    13. Dispo:  -Anticipated discharge to home 07/24/20  -Reviewed chart and meds  -Completed discharge documentation and wrote scripts as needed       plan discussed with patient, nurse, case management and Anoop Yeh MD Scott Sapperstein, MD  7/23/2020  11:03 AM

## 2020-07-23 NOTE — PROGRESS NOTES
Patient: Jael Herman  Location: [facility] [address]  MRN: [id number]  Today's date: 7/23/2020    History of Present Illness  Jael Herman is a 70 y.o. male whose primary indication for inpatient rehabilitation is Spinal Cord, Non-Traumatic.  Pertinent History of Current Functional Problem:  He has a history of Spinal stenosis of lumbar region with neurogenic claudication [M48.062] and was admitted for a lumbar spinal fusion. Underwent L3-5 PLDF (posterior lumbar decompression, L4-5 posterior lumbar fusion with instrumentation.  by Dr. Gregorio Lancaster 7/6/20 at [facility]. Post op he had anemia but did not require transfusion. His pain was managed with Tylenol, Neurontin, Flexeril and Dilaudid.  He was put on SQ Heparin for DVT ppx. The patient was seen by PM&R and found to have residual deficits in ambulation and ADLs due to S/P lumbar spinal fusion [Z98.1] and was transferred to [facility] on 7/14/2020 for acute inpatient rehab.      Past Medical History  Jael has a past medical history of BPH (benign prostatic hyperplasia), DJD (degenerative joint disease), Hypertension, Lumbar stenosis, and BEN (obstructive sleep apnea).    OT Pain    Date/Time Pain Type Pref Pain Scale Location Rating: [facility]   07/23/20 0731 Pain Assessment number (Numeric Rating Pain Scale) back 4 position adjusted KB          Prior Living Environment      Most Recent Value   Lives With  spouse   Living Arrangements  house   Home Accessibility  stairs to enter home (Group)   Number of Stairs, Main Entrance  4   Surface of Stairs, Main Entrance  concrete   Stair Railings, Main Entrance  railing on left side (ascending)   Landing, Stairs, Main Entrance  railings present   Stairs Comment, Main Entrance  side or front entrance   Location, Kitchen  first (main) floor   Kitchen Access Comment  pt completes IADLs at home   Location, Patient Bedroom  second floor, must negotiate stairs  to access   Patient Bedroom Access Comment  has bed on first floor   Location, Bathroom  first (main) floor, second floor, must negotiate stairs to access   Bathroom Access Comment  1st floor powder room, tub shower, wife has shower bench, renovating master bedroom closet into bathroom, standard toilets, no grab bars   Number of Stairs, Within Home, Secondary  other (see comments)   Stairs Comment, Within Home, Secondary  has stair lift for wife          Prior Level of Function      Most Recent Value   Dominant Hand  right   Ambulation  independent   Transferring  independent   Toileting  independent   Bathing  independent   Dressing  independent   Eating  independent   Equipment Currently Used at Home  none          IRF OT Evaluation and Treatment - 07/23/20 0731        Time Calculation    Start Time  0730     Stop Time  0900     Time Calculation (min)  90 min        Session Details    Document Type  discharge evaluation     Mode of Treatment  occupational therapy;individual therapy        Bed Mobility    [place], Supine to Sit  modified independence        Sit to Stand Transfer    [place], Sit to Stand Transfer  modified independence     Assistive Device  walker, front-wheeled        Stand to Sit Transfer    [place], Stand to Sit Transfer  modified independence     Assistive Device  walker, front-wheeled        Stand Pivot Transfer    [place], Stand Pivot/Stand Step Transfer  modified independence     Assistive Device  walker, front-wheeled        Toilet Transfer    Transfer Technique  sit-stand;stand-sit     [place], Toilet Transfer  modified independence     Assistive Device  walker, front-wheeled;grab bars/safety frame     Comment  ambulatory approach to toilet        Shower Transfer    Transfer Technique  stand pivot     [place], Shower Transfer  supervision     Assistive Device  walker, front-wheeled;shower chair;grab bars/tub rail     Comment  ambulatory approach to barrier free  shower stall        Bathing    Self-Performance  chest;left arm;right arm;abdomen;front perineal area;buttocks;left upper leg;right upper leg;left lower leg, including foot;right lower leg, including foot     [place]  modified independence;set up     Position  supported sitting     Setup Assistance  obtain supplies     Adaptive Equipment  grab bar/tub rail;hand-held shower spray hose;shower chair     Comment  figure 4 positioning to wash/dry feet        Upper Body Dressing    Self-Performance  obtains clothes;don;doff;threads left arm, bra/undershirt;threads right arm, bra/undershirt;pulls bra/undershirt over head/around back;pulls bra/undershirt down/adjusts;threads left arm, shirt;threads right arm, shirt;pulls shirt over head/around back;pulls shirt down/adjusts;orthosis application     [place]  modified independence     Position  edge of bed sitting     Adaptive Equipment  none     Comment  clothing retreival at RW level        Lower Body Dressing    Self-Performance  obtains clothes;don;doff;threads left leg, underpants;threads right leg, underpants;pulls underpants up or down;threads left leg, pants/shorts;threads right leg, pants/shorts;pulls pants/shorts up or down;dons/doffs left sock;dons/doffs right sock;dons/doffs left shoe;dons/doffs right shoe;shoelaces, left;shoelaces, right     [place]  modified independence     Position  edge of bed sitting     Adaptive Equipment  reacher;dressing stick     [place], Footwear  modified independence     Comment  clothing retrieval at RW level        Grooming    Self-Performance  washes, rinses and dries face;washes, rinses and dries hands;oral care (brushing teeth, cleaning dentures)     [place]  modified independence     Position  supported sitting;sink side     Adaptive Equipment  none     [place], Oral Hygiene  modified independence     Comment  pt completed while seated to maintain spinal precautions. Pt very cautious of bending and  reprots will sit on wife's rollator to complete grooming tasks at home.        Toileting    [place]  modified independence;adjust/manage clothing;perform bladder hygiene;perform bowel hygiene     Position  supported sitting     Adaptive Equipment  grab bar/safety frame        Discharge Summary (OT)    Outcomes Achieved Upon Discharge (OT)  all goals met within established timeframes     Discharge Summary Statement (OT)  Pt require setup for bathing and S for shower transfers, but mod I for all other ADLs and transfers. Pt has approrpiate DME for home. No family training completed during this time. Pt is able to manage LSO and able to mantain spinal precations during all functional tasks. Pt is safe for d/c home tomorrow with family and home OT     Transfer to Another Level of Care or Facility (OT)  patient to receive therapy via home health                       Education provided this session. See the Patient Education summary report for full details.    IRF OT Goals      Most Recent Value   Transfer Goal 1   Activity/Assistive Device  toilet at 07/15/2020 0740   [place]  modified independence at 07/21/2020 0731   Time Frame  short-term goal (STG), 5 - 7 days at 07/21/2020 0731   Progress/Outcome  goal met at 07/23/2020 0731   Transfer Goal 2   Activity/Assistive Device  toilet at 07/15/2020 0740   [place]  modified independence at 07/15/2020 0740   Time Frame  long-term goal (LTG), 2 weeks at 07/15/2020 0740   Progress/Outcome  goal met at 07/23/2020 0731   Transfer Goal 3   Activity/Assistive Device  shower at 07/15/2020 0740   [place]  set-up required, supervision required at 07/15/2020 0740   Time Frame  short-term goal (STG), 5 - 7 days at 07/21/2020 0731   Progress/Outcome  goal met at 07/23/2020 0731   Transfer Goal 4   Activity/Assistive Device  shower at 07/15/2020 0740   [place]  set-up required, supervision required at 07/15/2020 0740   Time Frame  long-term goal (LTG), 2 weeks  at 07/15/2020 0740   Progress/Outcome  goal met at 07/23/2020 0731   Bathing Goal 1   [place]  supervision required at 07/21/2020 0731   Time Frame  short-term goal (STG), 5 - 7 days at 07/21/2020 0731   Progress/Outcome  goal met at 07/23/2020 0731   Bathing Goal 2   [place]  supervision required, set-up required at 07/15/2020 0740   Time Frame  long-term goal (LTG), 14 days or less at 07/21/2020 0731   Progress/Outcome  goal met at 07/23/2020 0731   UB Dressing Goal 1   [place]  supervision required at 07/21/2020 0731   Time Frame  short-term goal (STG), 5 - 7 days at 07/21/2020 0731   Progress/Outcome  goal met at 07/23/2020 0731   UB Dressing Goal 2   [place]  modified independence at 07/15/2020 0740   Time Frame  long-term goal (LTG), 2 weeks at 07/15/2020 0740   Progress/Outcome  goal met at 07/23/2020 0731   LB Dressing Goal 1   [place]  supervision required at 07/21/2020 0731   Time Frame  short-term goal (STG), 5 - 7 days at 07/21/2020 0731   Progress/Outcome  goal met at 07/23/2020 0731   LB Dressing Goal 2   [place]  modified independence at 07/15/2020 0740   Time Frame  long-term goal (LTG), 2 weeks at 07/15/2020 0740   Progress/Outcome  goal met at 07/23/2020 0731   Grooming Goal 1   [place]  supervision required at 07/15/2020 0740   Time Frame  short-term goal (STG), 5 - 7 days at 07/21/2020 0731   Strategies/Barriers  in stance at 07/15/2020 0740   Progress/Outcome  goal met at 07/23/2020 0731   Grooming Goal 2   [place]  modified independence at 07/15/2020 0740   Time Frame  long-term goal (LTG), 2 weeks at 07/15/2020 0740   Strategies/Barriers  in stance at 07/15/2020 0740   Progress/Outcome  goal met at 07/23/2020 0731   Toileting Goal 1   [place]  supervision required at 07/21/2020 0731   Time Frame  short-term goal (STG), 5 - 7 days at 07/21/2020 0731   Progress/Outcome  goal met at 07/23/2020 0731   Toileting Goal 2   [place]  modified  independence at 07/15/2020 0740   Time Frame  long-term goal (LTG), 2 weeks at 07/15/2020 0740   Progress/Outcome  goal met at 07/23/2020 0731

## 2020-07-23 NOTE — PLAN OF CARE
Problem: Rehabilitation (IRF) Plan of Care  Goal: Plan of Care Review  Outcome: Progressing  Flowsheets (Taken 7/23/2020 8493)  Plan of Care Reviewed With: patient  IRF Plan of Care Review: progress ongoing, continue  Outcome Summary: ADL completed this session with S for shower transfer/setup for bathing and mod I for all other ADLs and transfers. Safe for d/c home tomorrow

## 2020-07-23 NOTE — PLAN OF CARE
Problem: Rehabilitation (IRF) Plan of Care  Goal: Plan of Care Review  Outcome: Progressing  Flowsheets (Taken 7/23/2020 1040)  Plan of Care Reviewed With: patient  Outcome Summary: Pt w/c changed to w/c sold back ( matrx elite) and matrx cushion with improved comfort, positioing, and decreased pain,

## 2020-07-23 NOTE — PROGRESS NOTES
Patient: Jael Herman  Location: [facility] [address]  MRN: [id number]  Today's date: 7/23/2020    History of Present Illness  Jael Herman is a 70 y.o. male whose primary indication for inpatient rehabilitation is Spinal Cord, Non-Traumatic.  Pertinent History of Current Functional Problem:  He has a history of Spinal stenosis of lumbar region with neurogenic claudication [M48.062] and was admitted for a lumbar spinal fusion. Underwent L3-5 PLDF (posterior lumbar decompression, L4-5 posterior lumbar fusion with instrumentation.  by Dr. Gregorio Lancaster 7/6/20 at [facility]. Post op he had anemia but did not require transfusion. His pain was managed with Tylenol, Neurontin, Flexeril and Dilaudid.  He was put on SQ Heparin for DVT ppx. The patient was seen by PM&R and found to have residual deficits in ambulation and ADLs due to S/P lumbar spinal fusion [Z98.1] and was transferred to [facility] on 7/14/2020 for acute inpatient rehab.      Past Medical History  Jael has a past medical history of BPH (benign prostatic hyperplasia), DJD (degenerative joint disease), Hypertension, Lumbar stenosis, and BEN (obstructive sleep apnea).    PT Vitals    Date/Time Pulse HR Source BP BP Location BP Method Pt Position [facility]   07/23/20 1001 102 Monitor 122/68 Left upper arm Automatic Sitting LG      PT Pain    Date/Time Pain Type Orientation Location Rating: Rest Interventions [facility]   07/23/20 1001 Pain Assessment lower back 4 position adjusted LG   07/23/20 1030 Post Activity lower back 4 position adjusted LG          Prior Living Environment      Most Recent Value   Lives With  spouse   Living Arrangements  house   Home Accessibility  stairs to enter home (Group)   Number of Stairs, Main Entrance  4   Surface of Stairs, Main Entrance  concrete   Stair Railings, Main Entrance  railing on left side (ascending)   Landing, Stairs, Main Entrance  railings present   Stairs Comment, Main  Entrance  side or front entrance   Location, Kitchen  first (main) floor   Kitchen Access Comment  pt completes IADLs at home   Location, Patient Bedroom  second floor, must negotiate stairs to access   Patient Bedroom Access Comment  has bed on first floor   Location, Bathroom  first (main) floor, second floor, must negotiate stairs to access   Bathroom Access Comment  1st floor powder room, tub shower, wife has shower bench, renovating master bedroom closet into bathroom, standard toilets, no grab bars   Number of Stairs, Within Home, Secondary  other (see comments)   Stairs Comment, Within Home, Secondary  has stair lift for wife          Prior Level of Function      Most Recent Value   Dominant Hand  right   Ambulation  independent   Transferring  independent   Toileting  independent   Bathing  independent   Dressing  independent   Eating  independent   Equipment Currently Used at Home  none          IRF PT Evaluation and Treatment - 07/23/20 1002        Time Calculation    Start Time  1000     Stop Time  1030     Time Calculation (min)  30 min        Session Details    Document Type  daily treatment/progress note     Mode of Treatment  individual therapy;physical therapy        General Information    General Observations of Patient  Pt received in gym Direct hand off from prior T session         Sit to Stand Transfer    [place], Sit to Stand Transfer  modified independence     Verbal Cues  safety     Assistive Device  walker, front-wheeled     Comment  from w/c        Stand to Sit Transfer    [place], Stand to Sit Transfer  modified independence     Verbal Cues  safety     Assistive Device  walker, front-wheeled     Comment  to w/c         Stand Pivot Transfer    [place], Stand Pivot/Stand Step Transfer  modified independence     Verbal Cues  safety     Assistive Device  walker, front-wheeled     Comment  via ambualtion         Gait Training    [place], Gait  modified independence      Assistive Device  walker, front-wheeled     Distance in Feet  300 feet     Gait Pattern Utilized  step-through     Bilateral Gait Deviations  heel strike decreased     Right Sided Gait Deviations  heel strike decreased     Comment  R foot slap occassionally        Lower Extremity (Therapeutic Exercise)    Comment (LE Therapeutic Exercise)  review of HEP: saeted: LAQ, glut set, ankle pumps, X 10, standing: heel raises, marching, hip abd. hamstring curl X 10        Daily Progress Summary (PT)    Daily Outcome Statement (PT)  Pt issued and reviewed HEP and RW safety this session. No questions for HEP.                        Education provided this session. See the Patient Education summary report for full details.    IRF PT Goals      Most Recent Value   Bed Mobility Goal 1   Activity/Assistive Device  bed mobility activities, all at 07/15/2020 1035   [place]  supervision required, verbal cues required at 07/15/2020 1035   Time Frame  short-term goal (STG), 5 - 7 days at 07/15/2020 1035   Progress/Outcome  goal met at 07/23/2020 0901   Bed Mobility Goal 2   Activity/Assistive Device  bed mobility activities, all at 07/15/2020 1035   [place]  modified independence at 07/15/2020 1035   Time Frame  long-term goal (LTG), 14 days or less at 07/15/2020 1035   Progress/Outcome  goal met at 07/23/2020 0901   Transfer Goal 1   Activity/Assistive Device  sit-to-stand/stand-to-sit, bed-to-chair/chair-to-bed, stand pivot, car transfer at 07/15/2020 1035   [place]  supervision required, verbal cues required at 07/15/2020 1035   Time Frame  short-term goal (STG), 5 - 7 days at 07/15/2020 1035   Progress/Outcome  goal met at 07/23/2020 0901   Transfer Goal 2   Activity/Assistive Device  sit-to-stand/stand-to-sit, bed-to-chair/chair-to-bed, stand pivot, car transfer at 07/15/2020 1035   [place]  modified independence at 07/15/2020 1035   Time Frame  long-term goal (LTG), 14 days or less at 07/15/2020 1035    Progress/Outcome  goal met at 07/23/2020 0901   Gait/Walking Locomotion Goal 1   Activity/Assistive Device  gait (walking locomotion), diminish gait deviation, increase endurance/gait distance, improve balance and speed at 07/15/2020 1035   Distance  150 feet at 07/15/2020 1035   [place]  supervision required, verbal cues required at 07/15/2020 1035   Time Frame  short-term goal (STG), 5 - 7 days at 07/15/2020 1035   Progress/Outcome  goal met at 07/23/2020 0901   Gait/Walking Locomotion Goal 2   Activity/Assistive Device  gait (walking locomotion), diminish gait deviation, increase endurance/gait distance, improve balance and speed at 07/15/2020 1035   Distance  250 feet at 07/15/2020 1035   [place]  modified independence at 07/15/2020 1035   Time Frame  long-term goal (LTG), 14 days or less at 07/15/2020 1035   Progress/Outcome  good progress toward goal, goal partially met at 07/23/2020 0901   Stairs Goal 1   Activity/Assistive Device  stairs, all skills, using handrail, left, using handrail, right at 07/15/2020 1035   Number of Stairs  12 at 07/15/2020 1035   [place]  supervision required, verbal cues required at 07/15/2020 1035   Time Frame  short-term goal (STG), 5 - 7 days at 07/15/2020 1035   Progress/Outcome  goal met at 07/23/2020 0901   Stairs Goal 2   Activity/Assistive Device  stairs, all skills [using 1 railing.] at 07/15/2020 1035   Number of Stairs  12 at 07/15/2020 1035   [place]  modified independence at 07/15/2020 1035   Time Frame  long-term goal (LTG), 14 days or less at 07/15/2020 1035   Progress/Outcome  good progress toward goal, goal ongoing at 07/23/2020 0901   Problem Specific Goal 1   Problem-Specific Goal 1  Pt. will demonstrate an improvement in standing balance as evidenced by a Burrell Balance Test score of > than or = to 32/56. at 07/15/2020 1035   Time Frame  short-term goal (STG), 5 - 7 days at 07/15/2020 1035   Problem Specific Goal 2   Problem-Specific Goal 2   Pt. will demonstrate an improvement in standing balance as evidenced by a Burrell Balance Test score of > than or = to 41/56, indicating a low falls risk. at 07/15/2020 1035   Time Frame  long-term goal (LTG), 14 days or less at 07/15/2020 1035

## 2020-07-23 NOTE — PROGRESS NOTES
Patient: Jael Herman  Location: [facility] [address]  MRN: [id number]  Today's date: 7/23/2020    History of Present Illness  Jael Herman is a 70 y.o. male whose primary indication for inpatient rehabilitation is Spinal Cord, Non-Traumatic.  Pertinent History of Current Functional Problem:  He has a history of Spinal stenosis of lumbar region with neurogenic claudication [M48.062] and was admitted for a lumbar spinal fusion. Underwent L3-5 PLDF (posterior lumbar decompression, L4-5 posterior lumbar fusion with instrumentation.  by Dr. Gregorio Lancaster 7/6/20 at [facility]. Post op he had anemia but did not require transfusion. His pain was managed with Tylenol, Neurontin, Flexeril and Dilaudid.  He was put on SQ Heparin for DVT ppx. The patient was seen by PM&R and found to have residual deficits in ambulation and ADLs due to S/P lumbar spinal fusion [Z98.1] and was transferred to [facility] on 7/14/2020 for acute inpatient rehab.      Past Medical History  Jael has a past medical history of BPH (benign prostatic hyperplasia), DJD (degenerative joint disease), Hypertension, Lumbar stenosis, and BEN (obstructive sleep apnea).       07/23/20 1001   Pain/Comfort/Sleep   Pain Charting Type Pain Assessment   Pain Body Location - Orientation lower   Pain Body Location back   Pain Rating (0-10): Rest 4   Pain Management Interventions position adjusted   Vital Signs   Heart Rate (!) 102   Heart Rate Source Monitor   /68   BP Location Left upper arm   BP Method Automatic   Patient Position Sitting        07/23/20 1030   Pain/Comfort/Sleep   Pain Charting Type Post Activity   Pain Body Location - Orientation lower   Pain Body Location back   Pain Rating (0-10): Rest 4   Pain Management Interventions position adjusted     Prior Living Environment      Most Recent Value   Lives With  spouse   Living Arrangements  house   Home Accessibility  stairs to enter home (Group)    Number of Stairs, Main Entrance  4   Surface of Stairs, Main Entrance  concrete   Stair Railings, Main Entrance  railing on left side (ascending)   Landing, Stairs, Main Entrance  railings present   Stairs Comment, Main Entrance  side or front entrance   Location, Kitchen  first (main) floor   Kitchen Access Comment  pt completes IADLs at home   Location, Patient Bedroom  second floor, must negotiate stairs to access   Patient Bedroom Access Comment  has bed on first floor   Location, Bathroom  first (main) floor, second floor, must negotiate stairs to access   Bathroom Access Comment  1st floor powder room, tub shower, wife has shower bench, renovating master bedroom closet into bathroom, standard toilets, no grab bars   Number of Stairs, Within Home, Secondary  other (see comments)   Stairs Comment, Within Home, Secondary  has stair lift for wife          Prior Level of Function      Most Recent Value   Dominant Hand  right   Ambulation  independent   Transferring  independent   Toileting  independent   Bathing  independent   Dressing  independent   Eating  independent   Equipment Currently Used at Home  none                 07/23/20 0901   Time Calculation   Start Time 0900   Stop Time 1000   Time Calculation (min) 60 min   Session Details   Document Type discharge evaluation   Mode of Treatment physical therapy;individual therapy   General Information   Patient Profile Reviewed? yes   General Observations of Patient pt received in room without complaints   Existing Precautions/Restrictions cardiac;fall;spinal   Sensory   Additional Documentation Sensory Assessment (Somatosensory) (Group)   Sensory Assessment (Somatosensory)   Sensory Assessment (Somatosensory) left LE;right LE;sensation intact   Left LE Sensory Assessment light touch awareness;proprioception;intact  (reports dec'd light touch L lateral foot)   Right LE Sensory Assessment light touch awareness;proprioception;intact   Range of Motion (ROM)    Range of Motion left lower extremity ROM deficit;right lower extremity ROM deficit   Left Lower Extremity (ROM) hip;knee;ankle   Hip, Left (ROM) WFL   Knee, Left (ROM) WFL   Ankle, Left (ROM) WFL   Right Lower Extremity (ROM) hip;knee;ankle   Hip, Right (ROM) WFL   Knee, Right (ROM) WFL   Ankle, Right (ROM) neutral PROM, <50% available AROM   Strength (Manual Muscle Testing)   Left Lower Extremity Strength hip;ankle;knee   Hip, Left (Strength) 5/5 flex, abd, add   Knee, Left (Strength) 5/5 throughout   Ankle, Left (Strength) 5/5   Right Lower Extremity Strength hip;knee;ankle   Hip, Right (Strength) 4+/5 flex, abd, add   Knee, Right (Strength) 5/5   Ankle, Right (Strength) DF 2+/5, PF 4/5, INV/EV 3+/5   Bed Mobility   [place], Roll Left modified independence   [place], Roll Right modified independence   [place], Supine to Sit modified independence   [place], Sit to Supine modified independence   Assistive Device (Bed Mobility) none   Comment (Bed Mobility) on flat bed, log roll technique   Sit to Stand Transfer   [place], Sit to Stand Transfer modified independence   Assistive Device walker, front-wheeled   Comment from WC   Stand to Sit Transfer   [place], Stand to Sit Transfer modified independence   Assistive Device walker, front-wheeled   Comment to WC   Stand Pivot Transfer   [place], Stand Pivot/Stand Step Transfer modified independence   Assistive Device walker, front-wheeled   Comment ambulatory approach   Car Transfer   Transfer Technique stand pivot   [place], Car Transfer modified independence   Assistive Device walker, front-wheeled   Comment ambulatory approach   Gait Training   [place], Gait modified independence   Assistive Device walker, front-wheeled   Distance in Feet 150 feet   Gait Pattern Utilized step-through   Deviations/Abnormal Patterns (Gait)   (forward flexed posture)   Bilateral Gait Deviations heel strike decreased   Right Sided Gait  "Deviations heel strike decreased   [place], Picking Up Object modified independence  (using reacher due to spinal precautions)   Comment slight R foot slap during ambulation, though no toe catching present   Curb Negotiation (Mobility)   [place] supervision   Comment up/down 6\"+2\" curb using RW, S for safety   Rough/Uneven Surface Gait Skills (Mobility)   [place] supervision   Comment 75' using RW over rug placed on tile, transitioning over thresholds from carpet to tile, and over carpeted surface   Sloped Surface Gait Skills (Mobility)   [place] supervision   Comment 5' indoor ramp using RW, S for safeety   Stairs Training   [place], Stairs supervision   Assistive Device railing;cane, straight   Handrail Location left side (descending);right side (descending)   Number of Stairs 12   Stair Height 6 inches   Ascending Stairs Technique step-to-step   Descending Stairs Technique step-to-step   Comment SPC and single L HR to mimic home setup, S for safety with min VCs initially for sequencing   Wheelchair Mobility/Management   Comment, Functional Mobility pt ambulates at mod I level and will not use a WC upon d/c   Balance   Static Sitting Balance WFL   Dynamic Sitting Balance WFL   Static Standing Balance WFL   Dynamic Standing Balance mild impairment   Comment, Balance standing supported with BUE on RW   Motor Skills   Motor Skills coordination;functional endurance;muscle tone;postural deviations   Coordination WFL;bilateral;lower extremity;heel to shin   Functional Endurance good   Muscle Tone WNL;bilateral;lower extremity(s)   Postural Deviations   Comment, Postural Deviations forward flexed posture in standing   Spinal Orthosis Management   Type (Spinal Orthosis) LSO (lumbar sacral orthosis)   Therapeutic Indications Spinal Orthosis post-op positioning/protection;stabilization and support   Wearing Schedule (Spinal Orthosis) wear when out of bed only;remove for hygiene/bathing   Fabrication " Comment (Spinal Orthosis) LSO donned throughout session   Functional Design (Spinal Orthosis) static orthosis   Bed Mobility Goal 1   Progress/Outcome goal met   Bed Mobility Goal 2   Progress/Outcome goal met   Transfer Goal 1   Progress/Outcome goal met   Transfer Goal 2   Progress/Outcome goal met   Gait/Walking Locomotion Goal 1   Progress/Outcome goal met   Gait/Walking Locomotion Goal 2   Progress/Outcome good progress toward goal;goal partially met   Stairs Goal 1   Progress/Outcome goal met   Stairs Goal 2   Progress/Outcome good progress toward goal;goal ongoing   Discharge Summary (PT)   Outcomes Achieved/Progress Made Upon Discharge (PT) goals partially achieved prior to discharge;progress was made toward goals   Discharge Summary Statement (PT) Pt is a 70 year old male with history of spinal stenosis who underwent a L3-L5 PLDF on 7/6/20 and admitted to [facility] On 7/14. Pt initially required minimal assistance for transfers, ambulation with rolling walker and elevations. Pt has progressed to mod I level for bed mobility, transfers, and indoor ambulation using a RW. Recommend supervision for elevations and outdoor ambulation due to residual RLE weakness and impaired balance. He demonstrates clinically significant improvement on WILLIS balance assessment, 25 to 34/56, and TUG 19 sec to 16 sec though continues to be at risk for falls and recommend continued use of an assistive device. Pt was seen in brace rounds with orthotist Aly Serrano. It was determined pt does not demonstrate bracing needs for RLE foot slap at this time, though recommend follow up in future PT services if condition worsens. Pt verablized agreement and understanding. Pt also reported owning a RW and possibly a SPC at home. Educated pt on adjustment for RW to optimal height. Also recommend where to purchase a SPC if needed. Pt provided with an HEP and demonstrates understanding. Pt would benefit from continued rehab services in home health  upon d/c home with family to continuing progressing toward long term goals.            Education provided this session. See the Patient Education summary report for full details.    IRF PT Goals      Most Recent Value   Bed Mobility Goal 1   Activity/Assistive Device  bed mobility activities, all at 07/15/2020 1035   [place]  supervision required, verbal cues required at 07/15/2020 1035   Time Frame  short-term goal (STG), 5 - 7 days at 07/15/2020 1035   Progress/Outcome  goal met at 07/23/2020 0901   Bed Mobility Goal 2   Activity/Assistive Device  bed mobility activities, all at 07/15/2020 1035   [place]  modified independence at 07/15/2020 1035   Time Frame  long-term goal (LTG), 14 days or less at 07/15/2020 1035   Progress/Outcome  goal met at 07/23/2020 0901   Transfer Goal 1   Activity/Assistive Device  sit-to-stand/stand-to-sit, bed-to-chair/chair-to-bed, stand pivot, car transfer at 07/15/2020 1035   [place]  supervision required, verbal cues required at 07/15/2020 1035   Time Frame  short-term goal (STG), 5 - 7 days at 07/15/2020 1035   Progress/Outcome  goal met at 07/23/2020 0901   Transfer Goal 2   Activity/Assistive Device  sit-to-stand/stand-to-sit, bed-to-chair/chair-to-bed, stand pivot, car transfer at 07/15/2020 1035   [place]  modified independence at 07/15/2020 1035   Time Frame  long-term goal (LTG), 14 days or less at 07/15/2020 1035   Progress/Outcome  goal met at 07/23/2020 0901   Gait/Walking Locomotion Goal 1   Activity/Assistive Device  gait (walking locomotion), diminish gait deviation, increase endurance/gait distance, improve balance and speed at 07/15/2020 1035   Distance  150 feet at 07/15/2020 1035   [place]  supervision required, verbal cues required at 07/15/2020 1035   Time Frame  short-term goal (STG), 5 - 7 days at 07/15/2020 1035   Progress/Outcome  goal met at 07/23/2020 0901   Gait/Walking Locomotion Goal 2   Activity/Assistive Device  gait (walking  locomotion), diminish gait deviation, increase endurance/gait distance, improve balance and speed at 07/15/2020 1035   Distance  250 feet at 07/15/2020 1035   [place]  modified independence at 07/15/2020 1035   Time Frame  long-term goal (LTG), 14 days or less at 07/15/2020 1035   Progress/Outcome  good progress toward goal, goal partially met at 07/23/2020 0901   Stairs Goal 1   Activity/Assistive Device  stairs, all skills, using handrail, left, using handrail, right at 07/15/2020 1035   Number of Stairs  12 at 07/15/2020 1035   [place]  supervision required, verbal cues required at 07/15/2020 1035   Time Frame  short-term goal (STG), 5 - 7 days at 07/15/2020 1035   Progress/Outcome  goal met at 07/23/2020 0901   Stairs Goal 2   Activity/Assistive Device  stairs, all skills [using 1 railing.] at 07/15/2020 1035   Number of Stairs  12 at 07/15/2020 1035   [place]  modified independence at 07/15/2020 1035   Time Frame  long-term goal (LTG), 14 days or less at 07/15/2020 1035   Progress/Outcome  good progress toward goal, goal ongoing at 07/23/2020 0901   Problem Specific Goal 1   Problem-Specific Goal 1  Pt. will demonstrate an improvement in standing balance as evidenced by a Burrell Balance Test score of > than or = to 32/56. at 07/15/2020 1035   Time Frame  short-term goal (STG), 5 - 7 days at 07/15/2020 1035   Problem Specific Goal 2   Problem-Specific Goal 2  Pt. will demonstrate an improvement in standing balance as evidenced by a Burrell Balance Test score of > than or = to 41/56, indicating a low falls risk. at 07/15/2020 1035   Time Frame  long-term goal (LTG), 14 days or less at 07/15/2020 1035

## 2020-07-23 NOTE — PLAN OF CARE
Problem: Rehabilitation (IRF) Plan of Care  Goal: Plan of Care Review  Outcome: Progressing  Flowsheets (Taken 7/23/2020 0159)  Plan of Care Reviewed With: patient  IRF Plan of Care Review: progress ongoing, continue  Outcome Summary: pt presented calm and able to communicate needs clearly, aaox3 and denies pain/sob/cp, continent of b-b, LSO OOB, refused SCD intervention this shift, pt slept great overnight with no apparent distress.

## 2020-07-23 NOTE — PLAN OF CARE
Problem: Rehabilitation (IRF) Plan of Care  Goal: Plan of Care Review  Outcome: Progressing  Flowsheets (Taken 7/23/2020 1056)  Plan of Care Reviewed With: patient  IRF Plan of Care Review: progress ongoing, continue  Outcome Summary: AAOx3; Continent of B&B; Denies pain; MOD I in room; D/C to home tomorrow; No report of concerns regarding nursing care at this time

## 2020-07-23 NOTE — DISCHARGE INSTRUCTIONS
Schedule follow up appointments with:  PCP in 1 week  Spine surgeon Dr. Gregorio Lancaster   Follow Up Appointment with Dr. Perkins Scheduled for August 27th @ 1:45pm    Nursing Instructions  1. Apply LSO Brace wheb out of a lying position  2. Be aware of spinal precautions at all times (No BLT)  3. Keep all follow up appointments  4. Monitor incision for signs of infection, allow steri strips to fall off on their own  5. Keep your home environment free of clutter and tripping hazards  6. Seek out medical assistance when needed

## 2020-07-23 NOTE — PLAN OF CARE
Problem: Rehabilitation (IRF) Plan of Care  Goal: Plan of Care Review  Flowsheets (Taken 7/23/2020 1223)  Plan of Care Reviewed With: patient  IRF Plan of Care Review: discharge pending  Outcome Summary: Discharge PT evaluation completed. Pt is mod I for bed mobility, transfers, and ambulation using a RW. Supervision for elevations and outdoor ambulation. Prepared for safe d/c home with family tomorrow 7/24.

## 2020-07-23 NOTE — PROGRESS NOTES
Subjective    Patient seen and examined on rounds.  Chart reviewed.  Events overnight noted.  History reviewed briefly with patient.    CC:  Deficits in mobility, transfers, self-care status post posterior lumbar decompression and fusion, lumbar stenosis, lumbar radiculopathy    HPI:  Mr. Jael Herman  is a 70-year-old right-handed -American gentleman with chronic conditions significant for hypertension, hyperlipidemia, benign process hypertrophy, sleep apnea, lumbar stenosis who had chronic low back pain for over 20 years and pain extending down his lower extremities especially right lower extremity with progressive weakness in his right lower extremity over the past year and he says he was noted to have a right foot drop.  He was diagnosed with lumbar spinal stenosis and underwent L3-5 PLDF by Dr. Gregorio Lancaster on 7/6/20 at [facility].  Postoperatively he is allowed weightbearing as tolerated on both lower extremities.  He was recommended a LSO brace for use when out of bed.  He did have urinary frequency postoperatively.  Drains were removed on 7/12/20.  Postoperative course was significant for abdominal pain and he reports multiple imaging studies were done of his abdomen and he also had barium follow-through.  He was seen by surgical team for his abdominal pain.  He reports abdominal x-rays were negative for obstruction.  Amylase, lipase were not elevated.  He says that when he takes Gabapentin he gets abdominal pain and he noticed that even before his surgery.  We will hold that for now.  Also he will be kept on Protonix/Pepcid.  He is on subcutaneous Heparin for DVT prophylaxis.  He is also on Dilaudid and Flexeril for pain control. He has been kept on bowel medications for his constipation issues.  He still reports ongoing abdominal pain and I mentioned to him that we will get another x-ray of his abdomen tomorrow.  If his abdominal pain gets worse, he may need to go to the  "ER for evaluation. On 7/10/20, hemoglobin was 10.1, WBC count 8.3, BUN 15, creatinine 0.9.  He has been needing assistance for mobility, transfers, self-care postoperatively. He is transferred to [place] [facility] on 7/14/20 for further rehabilitation care.    SUBJ:  Pain controlled.  He wants to go home o Friday. We reviewed d/c planning.  He will be eval'd in brace rounds later today for R AFO.  He was advised about not being cleared to resume driving yet.  He is scheduled for suture removal with surgeon Dr. Lancaster on Thursday 1pm.  Denies any fever, chills, sweat, abd discomfort, new numbness, tingling or weakness.    ROS: Denies chest pain or shortness of breath. Other ROS negative. Past, family, social history is unchanged.      Physical Exam      Blood pressure 113/68, pulse (!) 102, temperature 36.4 °C (97.6 °F), temperature source Oral, resp. rate 18, height 1.803 m (5' 11\"), weight 82.3 kg (181 lb 6.4 oz), SpO2 98 %.  Body mass index is 25.3 kg/m².      Physical Exam   Constitutional: He is oriented to person, place, and time. Vital signs are normal. He appears well-developed and well-nourished.   HENT:   Head: Normocephalic and atraumatic.   Eyes: Pupils are equal, round, and reactive to light. Conjunctivae and EOM are normal.   Cardiovascular: Normal rate and regular rhythm.   Pulmonary/Chest: Effort normal and breath sounds normal.   Abdominal: Soft. Normal appearance and bowel sounds are normal.   Genitourinary:   Genitourinary Comments: No brand catheter   Musculoskeletal: Normal range of motion.   No jt erythema, warmth or effusion   Neurological: He is alert and oriented to person, place, and time. He displays no tremor. No cranial nerve deficit. He exhibits normal muscle tone. He displays no seizure activity. Gait abnormal. Coordination normal.   Fluent, follows commands, strength >3/5 bilat UEs and LLE; strength RLE DF 3/5 and PF 3/5, tone 0/4.   Skin: Skin is warm, dry and intact.      "   Psychiatric: He has a normal mood and affect. His speech is normal and behavior is normal.         Current Facility-Administered Medications:   •  acetaminophen (TYLENOL) tablet 650 mg, 650 mg, oral, q4h PRN, Fredis Lyles MD  •  albuterol HFA (VENTOLIN HFA) 90 mcg/actuation inhaler 2 puff, 2 puff, inhalation, q6h PRN, Fredis Lyles MD  •  alum-mag hydroxide-simeth (MAALOX) 200-200-20 mg/5 mL suspension 30 mL, 30 mL, oral, q6h PRN, Fredis Lyles MD, 30 mL at 07/21/20 0217  •  bisacodyL (DULCOLAX) 10 mg suppository 10 mg, 10 mg, rectal, Daily PRN, Fredis Lyles MD  •  calcium carbonate (TUMS) chewable tablet 1,000 mg, 1,000 mg, oral, 3x daily PRN, Fredis Lyles MD  •  cholecalciferol (vitamin D3) tablet 1,000 Units, 1,000 Units, oral, Daily, Fredis Lyles MD, 1,000 Units at 07/23/20 0743  •  cyanocobalamin (VITAMIN B12) tablet 1,000 mcg, 1,000 mcg, oral, Daily, Fredis Lyles MD, 1,000 mcg at 07/23/20 0743  •  cyclobenzaprine (FLEXERIL) tablet 10 mg, 10 mg, oral, 3x daily PRN, Fredis Lyles MD  •  famotidine (PEPCID) tablet 20 mg, 20 mg, oral, BID, Fredis Lyles MD, 20 mg at 07/23/20 0743  •  heparin (porcine) 5,000 unit/mL injection 5,000 Units, 5,000 Units, subcutaneous, q8h CARRIE, Fredis Lyles MD, 5,000 Units at 07/23/20 0509  •  HYDROmorphone (DILAUDID) tablet 2-4 mg, 2-4 mg, oral, q4h PRN, Fredis Lyles MD, 4 mg at 07/15/20 1242  •  losartan (COZAAR) tablet 50 mg, 50 mg, oral, Daily, Fredis Lyles MD, 50 mg at 07/20/20 0854  •  ondansetron ODT (ZOFRAN-ODT) disintegrating tablet 4 mg, 4 mg, oral, q8h PRN, [facility], MD Escobar, 4 mg at 07/16/20 1914  •  pantoprazole (PROTONIX) tablet,delayed release (DR/EC) 40 mg, 40 mg, oral, Daily before breakfast, Fredis Lyles MD, 40 mg at 07/23/20 0743  •  polyethylene glycol (MIRALAX) 17 gram packet 17 g, 17 g, oral, Daily, Fredis Lyles MD, 17 g at 07/21/20 0833  •  polyethylene glycol (MIRALAX)  17 gram packet 17 g, 17 g, oral, Daily PRN, Fredis Lyles MD  •  rosuvastatin (CRESTOR) tablet 5 mg, 5 mg, oral, Daily, Fredis Lyles MD, 5 mg at 07/23/20 0743  •  sennosides-docusate sodium (SENOKOT-S) 8.6-50 mg per tablet 1 tablet, 1 tablet, oral, BID, Fredis Lyles MD, Stopped at 07/22/20 2105  •  sucralfate (CARAFATE) tablet 1 g, 1 g, oral, q6h [facility], Jimmy Adame DO, 1 g at 07/23/20 0509  •  tamsulosin (FLOMAX) 24 hr ER capsule 0.4 mg, 0.4 mg, oral, Nightly, Fredis Lyles MD, 0.4 mg at 07/22/20 2105       Labs / Radiology    Lab Results   Component Value Date    WBC 7.48 07/20/2020    HGB 12.3 (L) 07/20/2020    HCT 37.9 (L) 07/20/2020    MCV 94.8 07/20/2020     07/20/2020     Lab Results   Component Value Date    GLUCOSE 110 (H) 07/20/2020    CALCIUM 9.6 07/20/2020     07/20/2020    K 4.7 07/20/2020    CO2 25 07/20/2020     07/20/2020    BUN 20 07/20/2020    CREATININE 1.2 07/20/2020       Assessment and Plan    ASSESSMENT PLAN:  1. 70-year-old right-handed -American gentleman with chronic conditions significant for hypertension, hyperlipidemia, benign process hypertrophy, sleep apnea, lumbar stenosis who had chronic low back pain for over 20 years and pain extending down his lower extremities especially right lower extremity with progressive weakness in his right lower extremity over the past year and he says he was noted to have a right foot drop.  He was diagnosed with lumbar spinal stenosis and underwent L3-5 PLDF by Dr. Gregorio Lancaster on 7/6/20 at [facility].  Postoperatively he is allowed weightbearing as tolerated on both lower extremities.  He was recommended a LSO brace for use when out of bed.  He did have urinary frequency postoperatively.  Drains were removed on 7/12/20.  Postoperative course was significant for abdominal pain and he reports multiple imaging studies were done of his abdomen and he also had barium  follow-through.  He was seen by surgical team for his abdominal pain.  He reports abdominal x-rays were negative for obstruction.  Amylase, lipase were not elevated.  He says that when he takes Gabapentin he gets abdominal pain and he noticed that even before his surgery.  We will hold that for now.  Also he will be kept on Protonix/Pepcid.  He is on subcutaneous Heparin for DVT prophylaxis.  He is also on Dilaudid and Flexeril for pain control. He has been kept on bowel medications for his constipation issues.  He still reports ongoing abdominal pain and I mentioned to him that we will get another x-ray of his abdomen tomorrow.  If his abdominal pain gets worse, he may need to go to the ER for evaluation. On 7/10/20, hemoglobin was 10.1, WBC count 8.3, BUN 15, creatinine 0.9.  He has been needing assistance for mobility, transfers, self-care postoperatively. He is transferred to [place] [facility] on 7/14/20 for further rehabilitation care.     2. DVT prophylaxis - on Heparin.  On SCDs.  Platelets 385 on 7/15/2020.     3.  Lumbar stenosis - status post posterior lumbar decompression and fusion, lumbar stenosis, lumbar radiculopathy - continue PT, OT, psychology.  Monitor healing of lumbar incision.  He has chronic right foot drop.     4. GI - On Protonix for GI prophylaxis. On Pepcid.  MiraLAX, Senokot-S.  On PRN Dulcolax.  On PRN Maalox.  On Tums when necessary.  On PRN MiraLAX.  We will get abdominal x-ray tomorrow due to ongoing abdominal pain.Discussed results of abdominal x-ray with him.  He had some left upper quadrant pain today while in therapy and had to return back to room.  Discussed with him we can send him to [place] ER for evaluation but he wants to hold off.  He wants to wait another day and see how he feels tomorrow and if he is any worse he will go to the ER for evaluation for further work-up.  Discussed with nurse.  Discussed with patient that if he feels worse in the evening or at nighttime  house physician can evaluate him and send him to [facility] for evaluation if needed.He reports abdominal pain is less today.  He does not feel the need to go to the ER.  Progressing in therapy.  He reports decreased abdominal pain today.  He reports he will take MiraLAX tonight as he has some constipation.     5.  - voiding.  Denies dysuria.  Monitor post void residuals.  He has some urinary frequency.  On Flomax.     6.  Hypertension -  on Cozaar.     7. Pain - on when necessary Dilaudid.  On Flexeril PRN.  On Tylenol PRN.  Gabapentin was discontinued as patient says it causes abdominal pain for him.  He noticed this even prior to his surgery per patient.     8. Hematology -hemoglobin 11.8, WBC 8.99 on 11/15/2020.     9.  Hyperlipidemia - on Crestor.      10. F/E/N - on vitamin D.     11. Rehabilitation medicine - Continue comprehensive rehabilitation care. Continue PT, OT, speech, psychology.  We will follow falls precautions, cardiac precautions, monitor pulse oximetry in therapy and follow aspiration precautions.  We will follow spinal precautions.He reports he still had some abdominal pain today but is decreasing.  Abdominal x-rays were ordered.  Tolerating diet.Discussed results of abdominal x-ray with him.  He had some left upper quadrant pain today while in therapy and had to return back to room.  Discussed with him we can send him to [facility] for evaluation but he wants to hold off.  He wants to wait another day and see how he feels tomorrow and if he is any worse he will go to the ER for evaluation for further work-up.  Discussed with nurse.  Discussed with patient that if he feels worse in the evening or at nighttime house physician can evaluate him and send him to [place] ER for evaluation if needed.He reports abdominal pain is less today.  He does not feel the need to go to the ER.  Progressing in therapy.  He is ambulating up to 150 feet in therapy close supervision to min assist.  Working with  occupational therapy.  He reports he will take MiraLAX tonight as he has some constipation.Progressing in therapy.  Decreased abdominal pain per patient.  Able to walk better in therapy. Ambulating well in physical therapy.  He denies much abdominal pain today.  He indicates he has follow-up appointment at his surgeon's office on Thursday 7/23 at 1:15 PM.  Order written to that effect.  Discussed with nursing.  He says he is hoping to be discharged by next weekend.  Discussed with him he will be team next week.  Will eval in brace rounds regarding AFO RLE to assist with DF weakness.     12.  Mild anemia    Dispo: home with home on 7/24    Jimmy Adame, DO  7/23/2020

## 2020-07-24 VITALS
DIASTOLIC BLOOD PRESSURE: 68 MMHG | SYSTOLIC BLOOD PRESSURE: 117 MMHG | WEIGHT: 181.4 LBS | RESPIRATION RATE: 18 BRPM | BODY MASS INDEX: 25.4 KG/M2 | HEIGHT: 71 IN | HEART RATE: 92 BPM | TEMPERATURE: 98.1 F | OXYGEN SATURATION: 98 %

## 2020-07-24 PROCEDURE — 63700000 HC SELF-ADMINISTRABLE DRUG: Performed by: HOSPITALIST

## 2020-07-24 PROCEDURE — 63600000 HC DRUGS/DETAIL CODE: Performed by: HOSPITALIST

## 2020-07-24 RX ADMIN — CYANOCOBALAMIN TAB 1000 MCG 1000 MCG: 1000 TAB at 07:42

## 2020-07-24 RX ADMIN — PANTOPRAZOLE SODIUM 40 MG: 40 TABLET, DELAYED RELEASE ORAL at 07:43

## 2020-07-24 RX ADMIN — HEPARIN SODIUM 5000 UNITS: 5000 INJECTION INTRAVENOUS; SUBCUTANEOUS at 05:47

## 2020-07-24 RX ADMIN — SUCRALFATE 1 G: 1 TABLET ORAL at 05:48

## 2020-07-24 RX ADMIN — MELATONIN 1000 UNITS: at 07:42

## 2020-07-24 RX ADMIN — FAMOTIDINE 20 MG: 20 TABLET ORAL at 07:42

## 2020-07-24 RX ADMIN — ROSUVASTATIN CALCIUM 5 MG: 5 TABLET, FILM COATED ORAL at 07:42

## 2020-07-24 ASSESSMENT — COGNITIVE AND FUNCTIONAL STATUS - GENERAL
EYE_CONTACT: WNL
SLEEP_WAKE_CYCLE: DECREASED
INSIGHT: INTACT
CONCENTRATION: WNL
LIBIDO: NON-CONTRIBUTORY
SPEECH: REGULAR
AROUSAL LEVEL: ALERT
AFFECT: FULL RANGE
ORIENTATION: FULLY ORIENTED
APPETITE: DECREASED
DELUSIONS: NONE OR AGE APPROPRIATE
RECENT MEMORY: WNL
MOOD: HOPEFUL;MOTIVATED;FRUSTRATED
PERCEPTUAL FUNCTION: NORMAL
THOUGHT_PROCESS: WNL
ATTENTION: WNL
THOUGHT_CONTENT: APPROPRIATE
REMOTE MEMORY: WNL
IMPULSE CONTROL: INTACT
EST. PREMORBID INTELLIGENCE: ABOVE AVERAGE
PSYCHOMOTOR FUNCTIONING: DECREASED
APPEARANCE: WELL GROOMED

## 2020-07-24 NOTE — PROGRESS NOTES
Inpatient Psychology Progress Note    Duration: 25 minutes  Jael Herman : 1950, a 70 y.o. male, for follow up visit.  Reason:  He has been experiencing Adjustment Issues.    HPI: status post posterior lumbar decompression and fusion, lumbar stenosis, lumbar radiculopathy    Current Evaluation:     Mental Status Evaluation:  Arousal Level: Alert  Appearance: Well Groomed  Speech: Regular  Psychomotor Functioning: Decreased  Eye Contact: WNL  Est. Premorbid Intelligence: Above average  Orientation: Fully oriented  Attention: WNL  Concentration: WNL  Recent Memory: WNL  Remote Memory: WNL  Thought Content: Appropriate  Thought Process: WNL  Insight: Intact  Perceptual Function: Normal  Delusions: None or age appropriate  Sleeping: Decreased  Appetite: Decreased  Libido: Non-Contributory  Affect: Full Range  Mood: Hopeful, Motivated, Frustrated      Perry Suicide Severity Rating Scale:  Not done today    Interventions  Acceptance & Adjustment  Cognitive Behavior Training  Monitoring of Symptoms  Psychoeducation    Psychoeducation provided on:  Spinal Cord Injury and Recovery     Recommendations:   No Further sessions; Patient pending discharge    Goals    Maximize Adjustment and Acceptance  of Limitations         Visit Diagnosis:     1. Adjustment disorder with mixed anxiety and depressed mood        Diagnostic Impression: He reports that he has made gains and his GI symptoms are resolving.  He reports discharge is scheduled for Friday morning and he is pleased with this.  Discussed driving and he reports he will follow the doctor's recommendation not to drive until he gets medically cleared.  He reports his brother is willing to take him places.  He reports his daughter will be at home except for 2 days and if they need assistance he will hire somebody.  He reports his mood is better and he feels ready for discharge.  He reports he is still has need for therapy and is still in the course of recovery  and he is aware of this.  He reports he prefers to recover at home.  Will discharge from psychology..      Psychological condition is generally improving       DONAVAN Garcia.D

## 2020-07-24 NOTE — PLAN OF CARE
Problem: Rehabilitation (IRF) Plan of Care  Goal: Plan of Care Review  Outcome: Met  Flowsheets (Taken 7/24/2020 9635)  Plan of Care Reviewed With: patient  IRF Plan of Care Review: progress ongoing, continue  Outcome Summary: AAOx3; Continent of B&B; Denies pain; Discharge to home today; No report of concerns regarding nursing care at this time

## 2020-07-24 NOTE — PROGRESS NOTES
Subjective    Patient seen and examined on rounds.  Chart reviewed.  Events overnight noted.  History reviewed briefly with patient.    CC:  Deficits in mobility, transfers, self-care status post posterior lumbar decompression and fusion, lumbar stenosis, lumbar radiculopathy    HPI:  Mr. Jael Herman  is a 70-year-old right-handed -American gentleman with chronic conditions significant for hypertension, hyperlipidemia, benign process hypertrophy, sleep apnea, lumbar stenosis who had chronic low back pain for over 20 years and pain extending down his lower extremities especially right lower extremity with progressive weakness in his right lower extremity over the past year and he says he was noted to have a right foot drop.  He was diagnosed with lumbar spinal stenosis and underwent L3-5 PLDF by Dr. Gregorio Lancaster on 7/6/20 at [facility].  Postoperatively he is allowed weightbearing as tolerated on both lower extremities.  He was recommended a LSO brace for use when out of bed.  He did have urinary frequency postoperatively.  Drains were removed on 7/12/20.  Postoperative course was significant for abdominal pain and he reports multiple imaging studies were done of his abdomen and he also had barium follow-through.  He was seen by surgical team for his abdominal pain.  He reports abdominal x-rays were negative for obstruction.  Amylase, lipase were not elevated.  He says that when he takes Gabapentin he gets abdominal pain and he noticed that even before his surgery.  We will hold that for now.  Also he will be kept on Protonix/Pepcid.  He is on subcutaneous Heparin for DVT prophylaxis.  He is also on Dilaudid and Flexeril for pain control. He has been kept on bowel medications for his constipation issues.  He still reports ongoing abdominal pain and I mentioned to him that we will get another x-ray of his abdomen tomorrow.  If his abdominal pain gets worse, he may need to go to the  "ER for evaluation. On 7/10/20, hemoglobin was 10.1, WBC count 8.3, BUN 15, creatinine 0.9.  He has been needing assistance for mobility, transfers, self-care postoperatively. He is transferred to [facility] on 7/14/20 for further rehabilitation care.    SUBJ:  Pain controlled.  We reviewed d/c planning, meds, follow up appointments, and all questions answered..  He was eval'd in brace rounds yesterday for R AFO but no brace was felt to be needed.  He was advised about not being cleared to resume driving yet and agrees he will not drive until next follow up with neurosurgery in 4 weeks  He is had suture removal with surgeon Dr. Lancaster yesterday.  Denies any fever, chills, sweat, abd discomfort, new numbness, tingling or weakness.    ROS: Denies chest pain or shortness of breath. Other ROS negative. Past, family, social history is unchanged.      Physical Exam      Blood pressure 117/68, pulse 92, temperature 36.7 °C (98.1 °F), temperature source Oral, resp. rate 18, height 1.803 m (5' 11\"), weight 82.3 kg (181 lb 6.4 oz), SpO2 98 %.  Body mass index is 25.3 kg/m².      Physical Exam   Constitutional: He is oriented to person, place, and time. Vital signs are normal. He appears well-developed and well-nourished.   HENT:   Head: Normocephalic and atraumatic.   Eyes: Pupils are equal, round, and reactive to light. Conjunctivae and EOM are normal.   Cardiovascular: Normal rate and regular rhythm.   Pulmonary/Chest: Effort normal and breath sounds normal.   Abdominal: Soft. Normal appearance and bowel sounds are normal.   Genitourinary:   Genitourinary Comments: No brand catheter   Musculoskeletal: Normal range of motion.   No jt erythema, warmth or effusion   Neurological: He is alert and oriented to person, place, and time. He displays no tremor. No cranial nerve deficit. He exhibits normal muscle tone. He displays no seizure activity. Gait abnormal. Coordination normal.   Fluent, follows commands, strength " >3/5 bilat UEs and LLE; strength RLE DF 3/5 and PF 3/5, tone 0/4.   Skin: Skin is warm, dry and intact.        Psychiatric: He has a normal mood and affect. His speech is normal and behavior is normal.         Current Facility-Administered Medications:   •  acetaminophen (TYLENOL) tablet 650 mg, 650 mg, oral, q4h PRN, Fredis Lyles MD  •  albuterol HFA (VENTOLIN HFA) 90 mcg/actuation inhaler 2 puff, 2 puff, inhalation, q6h PRN, Fredis Lyles MD  •  alum-mag hydroxide-simeth (MAALOX) 200-200-20 mg/5 mL suspension 30 mL, 30 mL, oral, q6h PRN, Fredis Lyles MD, 30 mL at 07/21/20 0217  •  bisacodyL (DULCOLAX) 10 mg suppository 10 mg, 10 mg, rectal, Daily PRN, Fredis Lyles MD  •  calcium carbonate (TUMS) chewable tablet 1,000 mg, 1,000 mg, oral, 3x daily PRN, Fredis Lyles MD  •  cholecalciferol (vitamin D3) tablet 1,000 Units, 1,000 Units, oral, Daily, Fredis Lyles MD, 1,000 Units at 07/24/20 0742  •  cyanocobalamin (VITAMIN B12) tablet 1,000 mcg, 1,000 mcg, oral, Daily, Fredis Lyles MD, 1,000 mcg at 07/24/20 0742  •  cyclobenzaprine (FLEXERIL) tablet 10 mg, 10 mg, oral, 3x daily PRN, Fredis Lyles MD  •  famotidine (PEPCID) tablet 20 mg, 20 mg, oral, BID, Fredis Lyles MD, 20 mg at 07/24/20 0742  •  heparin (porcine) 5,000 unit/mL injection 5,000 Units, 5,000 Units, subcutaneous, q8h CARRIE, Fredis Lyles MD, 5,000 Units at 07/24/20 0547  •  HYDROmorphone (DILAUDID) tablet 2-4 mg, 2-4 mg, oral, q4h PRN, Fredis Lyles MD, 4 mg at 07/15/20 1242  •  losartan (COZAAR) tablet 50 mg, 50 mg, oral, Daily, Fredis Lyles MD, 50 mg at 07/20/20 0854  •  ondansetron ODT (ZOFRAN-ODT) disintegrating tablet 4 mg, 4 mg, oral, q8h PRN, Fredis Lyles MD, 4 mg at 07/16/20 1914  •  pantoprazole (PROTONIX) tablet,delayed release (DR/EC) 40 mg, 40 mg, oral, Daily before breakfast, Fredis Lyles MD, 40 mg at 07/24/20 0743  •  polyethylene glycol (MIRALAX) 17  gram packet 17 g, 17 g, oral, Daily PRN, Fredis Lyles MD  •  polyethylene glycol (MIRALAX) 17 gram packet 17 g, 17 g, oral, Daily PRN, Fredis Lyles MD  •  rosuvastatin (CRESTOR) tablet 5 mg, 5 mg, oral, Daily, Fredis Lyles MD, 5 mg at 07/24/20 0742  •  sennosides-docusate sodium (SENOKOT-S) 8.6-50 mg per tablet 1 tablet, 1 tablet, oral, 2x daily PRN, Fredis Lyles MD  •  sucralfate (CARAFATE) tablet 1 g, 1 g, oral, q6h CARRIE, Fredis Lyles MD, 1 g at 07/24/20 0548  •  tamsulosin (FLOMAX) 24 hr ER capsule 0.4 mg, 0.4 mg, oral, Nightly, Fredis Lyles MD, 0.4 mg at 07/23/20 2112    Current Outpatient Medications:   •  acetaminophen (TYLENOL) 325 mg tablet, Take 2 tablets (650 mg total) by mouth every 4 (four) hours as needed for mild pain or moderate pain., Disp: , Rfl:   •  cholecalciferol, vitamin D3, 1,000 unit (25 mcg) tablet, Take 1 tablet (1,000 Units total) by mouth daily., Disp: 30 tablet, Rfl: 0  •  cyanocobalamin (VITAMIN B12) 1,000 mcg tablet, Take 1 tablet (1,000 mcg total) by mouth daily., Disp: 30 tablet, Rfl: 0  •  famotidine (PEPCID) 20 mg tablet, Take 1 tablet (20 mg total) by mouth 2 (two) times a day Indications: gastroesophageal reflux disease, heartburn., Disp: 60 tablet, Rfl: 0  •  losartan (COZAAR) 50 mg tablet, Take 50 mg by mouth daily., Disp: , Rfl:   •  omeprazole OTC (PriLOSEC OTC) 20 mg EC tablet, Take 1 tablet (20 mg total) by mouth daily before breakfast., Disp: 30 tablet, Rfl: 0  •  rosuvastatin (CRESTOR) 5 mg tablet, Take 5 mg by mouth daily., Disp: , Rfl:   •  sennosides-docusate sodium (SENOKOT-S) 8.6-50 mg, Take 1 tablet by mouth 2 (two) times a day as needed for constipation., Disp: , Rfl:   •  sucralfate (CARAFATE) 1 gram tablet, Take 1 tablet (1 g total) by mouth every 6 (six) hours., Disp: 120 tablet, Rfl: 0  •  tamsulosin (FLOMAX) 0.4 mg capsule, Take 0.4 mg by mouth nightly., Disp: , Rfl:        Labs / Radiology    Lab Results   Component  Value Date    WBC 7.48 07/20/2020    HGB 12.3 (L) 07/20/2020    HCT 37.9 (L) 07/20/2020    MCV 94.8 07/20/2020     07/20/2020     Lab Results   Component Value Date    GLUCOSE 110 (H) 07/20/2020    CALCIUM 9.6 07/20/2020     07/20/2020    K 4.7 07/20/2020    CO2 25 07/20/2020     07/20/2020    BUN 20 07/20/2020    CREATININE 1.2 07/20/2020       Assessment and Plan    ASSESSMENT PLAN:  1. 70-year-old right-handed -American gentleman with chronic conditions significant for hypertension, hyperlipidemia, benign process hypertrophy, sleep apnea, lumbar stenosis who had chronic low back pain for over 20 years and pain extending down his lower extremities especially right lower extremity with progressive weakness in his right lower extremity over the past year and he says he was noted to have a right foot drop.  He was diagnosed with lumbar spinal stenosis and underwent L3-5 PLDF by Dr. Gregorio Lancaster on 7/6/20 at [facility].  Postoperatively he is allowed weightbearing as tolerated on both lower extremities.  He was recommended a LSO brace for use when out of bed.  He did have urinary frequency postoperatively.  Drains were removed on 7/12/20.  Postoperative course was significant for abdominal pain and he reports multiple imaging studies were done of his abdomen and he also had barium follow-through.  He was seen by surgical team for his abdominal pain.  He reports abdominal x-rays were negative for obstruction.  Amylase, lipase were not elevated.  He says that when he takes Gabapentin he gets abdominal pain and he noticed that even before his surgery.  We will hold that for now.  Also he will be kept on Protonix/Pepcid.  He is on subcutaneous Heparin for DVT prophylaxis.  He is also on Dilaudid and Flexeril for pain control. He has been kept on bowel medications for his constipation issues.  He still reports ongoing abdominal pain and I mentioned to him that we will get  another x-ray of his abdomen tomorrow.  If his abdominal pain gets worse, he may need to go to the ER for evaluation. On 7/10/20, hemoglobin was 10.1, WBC count 8.3, BUN 15, creatinine 0.9.  He has been needing assistance for mobility, transfers, self-care postoperatively. He is transferred to [facility] on 7/14/20 for further rehabilitation care.     2. DVT prophylaxis - on Heparin.  On SCDs.  Platelets 385 on 7/15/2020.     3.  Lumbar stenosis - status post posterior lumbar decompression and fusion, lumbar stenosis, lumbar radiculopathy - continue PT, OT, psychology.  Monitor healing of lumbar incision.  He has chronic right foot drop.     4. GI - On Protonix for GI prophylaxis. On Pepcid.  MiraLAX, Senokot-S.  On PRN Dulcolax.  On PRN Maalox.  On Tums when necessary.  On PRN MiraLAX.  We will get abdominal x-ray tomorrow due to ongoing abdominal pain.Discussed results of abdominal x-ray with him.  He had some left upper quadrant pain today while in therapy and had to return back to room.  Discussed with him we can send him to [place] ER for evaluation but he wants to hold off.  He wants to wait another day and see how he feels tomorrow and if he is any worse he will go to the ER for evaluation for further work-up.  Discussed with nurse.  Discussed with patient that if he feels worse in the evening or at nighttime house physician can evaluate him and send him to [place] ER for evaluation if needed.He reports abdominal pain is less today.  He does not feel the need to go to the ER.  Progressing in therapy.  He reports decreased abdominal pain today.  He reports he will take MiraLAX tonight as he has some constipation.     5.  - voiding.  Denies dysuria.  Monitor post void residuals.  He has some urinary frequency.  On Flomax.     6.  Hypertension -  on Cozaar.     7. Pain - on when necessary Dilaudid.  On Flexeril PRN.  On Tylenol PRN.  Gabapentin was discontinued as patient says it causes abdominal  pain for him.  He noticed this even prior to his surgery per patient.     8. Hematology -hemoglobin 11.8, WBC 8.99 on 11/15/2020.     9.  Hyperlipidemia - on Crestor.      10. F/E/N - on vitamin D.     11. Rehabilitation medicine - Continue comprehensive rehabilitation care. Continue PT, OT, speech, psychology.  We will follow falls precautions, cardiac precautions, monitor pulse oximetry in therapy and follow aspiration precautions.  We will follow spinal precautions.He reports he still had some abdominal pain today but is decreasing.  Abdominal x-rays were ordered.  Tolerating diet.Discussed results of abdominal x-ray with him.  He had some left upper quadrant pain today while in therapy and had to return back to room.  Discussed with him we can send him to [place] ER for evaluation but he wants to hold off.  He wants to wait another day and see how he feels tomorrow and if he is any worse he will go to the ER for evaluation for further work-up.  Discussed with nurse.  Discussed with patient that if he feels worse in the evening or at nighttime house physician can evaluate him and send him to [place] ER for evaluation if needed.He reports abdominal pain is less today.  He does not feel the need to go to the ER.  Progressing in therapy.  He is ambulating up to 150 feet in therapy close supervision to min assist.  Working with occupational therapy.  He reports he will take MiraLAX tonight as he has some constipation.Progressing in therapy.  Decreased abdominal pain per patient.  Able to walk better in therapy. Ambulating well in physical therapy.  He denies much abdominal pain today.  He indicates he has follow-up appointment at his surgeon's office on Thursday 7/23 at 1:15 PM.  Order written to that effect.  Discussed with nursing.  He says he is hoping to be discharged by next weekend.  Discussed with him he will be team next week.  Will eval in brace rounds regarding AFO RLE to assist with DF weakness.     12.  Mild  anemia    Dispo: medically stable for discharge to home today.    Jimmy Adame,   7/23/2020

## 2020-07-24 NOTE — NURSING NOTE
Patient d/c to home with all personal belongings including clothes, cell phone, heating pad, and all paperwork from appointments and discharge paperwork.  Pt provided verbal and written acknowledgement of all instructions at d/c

## 2020-07-24 NOTE — PLAN OF CARE
Problem: Rehabilitation (IRF) Plan of Care  Goal: Plan of Care Review  Outcome: Progressing  Flowsheets (Taken 7/23/2020 1369)  Plan of Care Reviewed With: patient  IRF Plan of Care Review: progress ongoing, continue  Outcome Summary: Pt is alert oriented. no c/o pain and discomfort. SCDc on. Safety maintained Continent with baldder. No BM. All meds and care reviewed with pt.

## 2020-07-27 NOTE — DISCHARGE SUMMARY
Rehab Discharge Summary      Admitting Provider: Anoop Yeh MD  Discharge Provider: Jimmy Adame DO  Primary Care Physician at Discharge: Margarita Alexandre [phone]     Admission Date: 7/14/2020     Discharge Date: 7/24/2020    Discharge Disposition  Home    Discharge Medications     Medication List      START taking these medications    cholecalciferol (vitamin D3) 1,000 unit (25 mcg) tablet  Take 1 tablet (1,000 Units total) by mouth daily.  Dose:  1,000 Units     cyanocobalamin 1,000 mcg tablet  Commonly known as:  VITAMIN B12  Take 1 tablet (1,000 mcg total) by mouth daily.  Dose:  1,000 mcg     famotidine 20 mg tablet  Commonly known as:  PEPCID  Take 1 tablet (20 mg total) by mouth 2 (two) times a day Indications: gastroesophageal reflux disease, heartburn.  Dose:  20 mg     omeprazole OTC 20 mg EC tablet  Commonly known as:  PriLOSEC OTC  Take 1 tablet (20 mg total) by mouth daily before breakfast.  Dose:  20 mg     sennosides-docusate sodium 8.6-50 mg  Commonly known as:  SENOKOT-S  Take 1 tablet by mouth 2 (two) times a day as needed for constipation.  Dose:  1 tablet     sucralfate 1 gram tablet  Commonly known as:  CARAFATE  Take 1 tablet (1 g total) by mouth every 6 (six) hours.  Dose:  1 g        CHANGE how you take these medications    acetaminophen 325 mg tablet  Commonly known as:  TYLENOL  Take 2 tablets (650 mg total) by mouth every 4 (four) hours as needed for mild pain or moderate pain.  Dose:  650 mg  What changed:    · medication strength  · how much to take  · when to take this  · reasons to take this        CONTINUE taking these medications    losartan 50 mg tablet  Commonly known as:  COZAAR  Take 50 mg by mouth daily.  Dose:  50 mg     rosuvastatin 5 mg tablet  Commonly known as:  CRESTOR  Take 5 mg by mouth daily.  Dose:  5 mg     tamsulosin 0.4 mg capsule  Commonly known as:  FLOMAX  Take 0.4 mg by mouth nightly.  Dose:  0.4 mg        STOP taking these medications     albuterol HFA 90 mcg/actuation inhaler  Commonly known as:  VENTOLIN HFA     alum-mag hydroxide-simeth 200-200-20 mg/5 mL suspension  Commonly known as:  MAALOX     bisacodyL 10 mg suppository  Commonly known as:  DULCOLAX     cyclobenzaprine 10 mg tablet  Commonly known as:  FLEXERIL     docusate sodium 100 mg capsule  Commonly known as:  COLACE     gabapentin 100 mg capsule  Commonly known as:  NEURONTIN     heparin (porcine) 5,000 unit/mL syringe     HYDROmorphone 2 mg tablet  Commonly known as:  DILAUDID     ipratropium-albuteroL  mcg/actuation inhaler  Commonly known as:  COMBIVENT RESPIMAT     polyethylene glycol 17 gram packet  Commonly known as:  MIRALAX              Reason for Hospitalization    Deficits in mobility, transfers, self-care status post posterior lumbar decompression and fusion, lumbar stenosis, lumbar radiculopathy    History of Present Illness    Mr. Jael Herman  is a 70-year-old right-handed -American gentleman with chronic conditions significant for hypertension, hyperlipidemia, benign process hypertrophy, sleep apnea, lumbar stenosis who had chronic low back pain for over 20 years and pain extending down his lower extremities especially right lower extremity with progressive weakness in his right lower extremity over the past year and he says he was noted to have a right foot drop.  He was diagnosed with lumbar spinal stenosis and underwent L3-5 PLDF by Dr. Gregorio Lancaster on 7/6/20 at [facility].  Postoperatively he is allowed weightbearing as tolerated on both lower extremities.  He was recommended a LSO brace for use when out of bed.  He did have urinary frequency postoperatively.  Drains were removed on 7/12/20.  Postoperative course was significant for abdominal pain and he reports multiple imaging studies were done of his abdomen and he also had barium follow-through.  He was seen by surgical team for his abdominal pain.  He reports abdominal  x-rays were negative for obstruction.  Amylase, lipase were not elevated.  He says that when he takes Gabapentin he gets abdominal pain and he noticed that even before his surgery.  We will hold that for now.  Also he will be kept on Protonix/Pepcid.  He is on subcutaneous Heparin for DVT prophylaxis.  He is also on Dilaudid and Flexeril for pain control. He has been kept on bowel medications for his constipation issues.  He still reports ongoing abdominal pain and I mentioned to him that we will get another x-ray of his abdomen tomorrow.  If his abdominal pain gets worse, he may need to go to the ER for evaluation. On 7/10/20, hemoglobin was 10.1, WBC count 8.3, BUN 15, creatinine 0.9.  He has been needing assistance for mobility, transfers, self-care postoperatively. He is transferred to [facility] on 7/14/20 for further rehabilitation care.    Pertinent Physical Findings on Admission    The patient had stable vital signs.  General Appearance: Not in acute distress  Head/Ear/Nose/Throat: Normocephalic; Atraumatic.   Eye: EOMI; PERRL.   Neck: No JVD; No Bruits.   Respiratory: Decreased breath sounds at bases.   Cardiovascular: RRR; Normal S1, S2.   Gastrointestinal: Soft; NT; +BS.   Extremities: Bilateral lower extremity edema noted.  Incision lumbar spine covered by dressing.  Musculoskeletal: Functional active range of motion in both upper extremities.  He is able to do active straight leg raise in both lower extremities but has weakness in right ankle dorsiflexion and he says he has a known right foot drop.  Neurological: AAO ×3. Speech is fluent. Cranial nerve examination does not reveal any gross facial asymmetry. Strength testing about 4+/5 strength in both upper extremities.  Bilateral hip flexion is 4/5.  Bilateral quadriceps are 4/5.  Right ankle dorsiflexion is 2+/5.  Right ankle plantar flexion is 4+/5.  Left ankle dorsi and plantar flexion of 4+/5.  He is grossly able to localize touch and  position sense in great toes but has some numbness in his legs and feet.  Deep tendon reflexes are hypoactive and symmetric bilaterally.  Plantars are flexor.  Coordination is functional upper extremities.    Neurologically unchanged.  Behavior/Emotional: Appropriate; Cooperative.   Skin: No obvious rashes noted.  Incision lumbar spine covered with dressing.    Hospital Course    1. The patient was admitted to [facility] for a comprehensive inpatient rehabilitation program to include physical therapy, occupational therapy, rehabilitation nursing, case management evaluation.  Dr. Lyles was consulted to follow from an internal medicine standpoint.     2. DVT prophylaxis - on Heparin.  On SCDs.  Platelets 385 on 7/15/2020.     3.  Lumbar stenosis - status post posterior lumbar decompression and fusion, lumbar stenosis, lumbar radiculopathy - continue PT, OT, psychology.  Monitor healing of lumbar incision.  He has chronic right foot drop.     4. GI - On Protonix for GI prophylaxis. On Pepcid.  MiraLAX, Senokot-S.  On PRN Dulcolax.  On PRN Maalox.  On Tums when necessary.  On PRN MiraLAX.  We will get abdominal x-ray tomorrow due to ongoing abdominal pain.Discussed results of abdominal x-ray with him.  He had some left upper quadrant pain today while in therapy and had to return back to room.  Discussed with him we can send him to [facility] for evaluation but he wants to hold off.  He wants to wait another day and see how he feels tomorrow and if he is any worse he will go to the ER for evaluation for further work-up.  Discussed with nurse.  Discussed with patient that if he feels worse in the evening or at nighttime house physician can evaluate him and send him to [facility] for evaluation if needed.He reports abdominal pain is less today.  He does not feel the need to go to the ER.  Progressing in therapy.  He reports decreased abdominal pain today.  He reports he will take MiraLAX tonight as he has  some constipation.     5.  - voiding.  Denies dysuria.  Monitor post void residuals.  He has some urinary frequency.  On Flomax.     6.  Hypertension -  on Cozaar.     7. Pain - on when necessary Dilaudid.  On Flexeril PRN.  On Tylenol PRN.  Gabapentin was discontinued as patient says it causes abdominal pain for him.  He noticed this even prior to his surgery per patient.     8. Hematology -hemoglobin 11.8, WBC 8.99 on 11/15/2020.     9.  Hyperlipidemia - on Crestor.      10. F/E/N - on vitamin D.     11. Rehabilitation medicine - Continue comprehensive rehabilitation care. Continue PT, OT, speech, psychology.  We will follow falls precautions, cardiac precautions, monitor pulse oximetry in therapy and follow aspiration precautions.  We will follow spinal precautions.He reports he still had some abdominal pain today but is decreasing.  Abdominal x-rays were ordered.  Tolerating diet.Discussed results of abdominal x-ray with him.  He had some left upper quadrant pain today while in therapy and had to return back to room.  Discussed with him we can send him to [facility] for evaluation but he wants to hold off.  He wants to wait another day and see how he feels tomorrow and if he is any worse he will go to the ER for evaluation for further work-up.  Discussed with nurse.  Discussed with patient that if he feels worse in the evening or at nighttime house physician can evaluate him and send him to [facility] for evaluation if needed.He reports abdominal pain is less today.  He does not feel the need to go to the ER.  Progressing in therapy.  He is ambulating up to 150 feet in therapy close supervision to min assist.  Working with occupational therapy.  He reports he will take MiraLAX tonight as he has some constipation.Progressing in therapy.  Decreased abdominal pain per patient.  Able to walk better in therapy.  Otherwise he tolerated therapies well and made good progress in his functional status.  Prior to his  discharge he had appointment at his surgeon's office when sutures were removed from his incision at surgeon's office.  During my absence, he was followed from a PMR standpoint by Dr. Jimmy Adame who also saw him on the day of his discharge.  He was comfortable with discharge plans to home on 7/24/2020.     12. Reviewed labs today.  BUN 19, creatinine 1.1 on 7/15/2020.    13. Discharge to home today, 7/24/2020. Discussed discharge plans. Discharge summary will be completed. Follow up with PCP, spine surgeon Dr. Gregorio Lancaster, pain management physician, neurologist, gastroenterologist and other appropriate physicians. Further therapies set up after discharge. Discharge instructions on discharge form.  Discharge prescriptions per Dr. Lyles who was following him from an internal medicine standpoint.  Dr. Adame evaluate him from a PMR standpoint on the day of his discharge.     13. Otherwise he tolerated therapies well and made good progress in his functional status. He had no further medical complications and was subsequently discharged to home. He will need close follow-up with his PCP, spine surgeon Dr. Gregorio Lancaster, pain management physician, neurologist, gastroenterologist and other appropriate physicians and should continue further therapies after discharge from [facility].

## 2020-09-29 ENCOUNTER — APPOINTMENT (RX ONLY)
Dept: URBAN - METROPOLITAN AREA CLINIC 23 | Facility: CLINIC | Age: 70
Setting detail: DERMATOLOGY
End: 2020-09-29

## 2020-09-29 DIAGNOSIS — L85.3 XEROSIS CUTIS: ICD-10-CM

## 2020-09-29 DIAGNOSIS — L663 OTHER SPECIFIED DISEASES OF HAIR AND HAIR FOLLICLES: ICD-10-CM

## 2020-09-29 DIAGNOSIS — L738 OTHER SPECIFIED DISEASES OF HAIR AND HAIR FOLLICLES: ICD-10-CM

## 2020-09-29 DIAGNOSIS — L82.1 OTHER SEBORRHEIC KERATOSIS: ICD-10-CM

## 2020-09-29 DIAGNOSIS — L73.9 FOLLICULAR DISORDER, UNSPECIFIED: ICD-10-CM

## 2020-09-29 PROBLEM — L02.821 FURUNCLE OF HEAD [ANY PART, EXCEPT FACE]: Status: ACTIVE | Noted: 2020-09-29

## 2020-09-29 PROCEDURE — ? TREATMENT REGIMEN

## 2020-09-29 PROCEDURE — ? ADDITIONAL NOTES

## 2020-09-29 PROCEDURE — ? MEDICATION COUNSELING

## 2020-09-29 PROCEDURE — ? COUNSELING

## 2020-09-29 PROCEDURE — ? PRESCRIPTION

## 2020-09-29 PROCEDURE — 99202 OFFICE O/P NEW SF 15 MIN: CPT

## 2020-09-29 RX ORDER — KETOCONAZOLE 20 MG/ML
SHAMPOO, SUSPENSION TOPICAL
Qty: 1 | Refills: 3 | Status: ERX | COMMUNITY
Start: 2020-09-29

## 2020-09-29 RX ORDER — CLOBETASOL PROPIONATE 0.5 MG/ML
SOLUTION TOPICAL
Qty: 1 | Refills: 3 | Status: ERX | COMMUNITY
Start: 2020-09-29

## 2020-09-29 RX ADMIN — KETOCONAZOLE: 20 SHAMPOO, SUSPENSION TOPICAL at 00:00

## 2020-09-29 RX ADMIN — CLOBETASOL PROPIONATE: 0.5 SOLUTION TOPICAL at 00:00

## 2020-09-29 ASSESSMENT — LOCATION SIMPLE DESCRIPTION DERM
LOCATION SIMPLE: POSTERIOR SCALP
LOCATION SIMPLE: RIGHT LOWER BACK
LOCATION SIMPLE: LEFT UPPER BACK
LOCATION SIMPLE: SCALP

## 2020-09-29 ASSESSMENT — LOCATION DETAILED DESCRIPTION DERM
LOCATION DETAILED: RIGHT INFERIOR PARIETAL SCALP
LOCATION DETAILED: MID-OCCIPITAL SCALP
LOCATION DETAILED: RIGHT INFERIOR MEDIAL MIDBACK
LOCATION DETAILED: LEFT SUPERIOR MEDIAL UPPER BACK
LOCATION DETAILED: LEFT INFERIOR PARIETAL SCALP

## 2020-09-29 ASSESSMENT — LOCATION ZONE DERM
LOCATION ZONE: SCALP
LOCATION ZONE: TRUNK

## 2020-09-29 NOTE — PROCEDURE: TREATMENT REGIMEN
Detail Level: Simple
Samples Given: Aquaphor ointment spray
Otc Regimen: Aquaphor ointment spray daily
Initiate Treatment: Clobetasol 0.05% scalp solution: Apply thin layer to scalp at bedtime x 1 week. Then use 3 times weekly at bedtime.\\n\\nKetoconazole 2% shampoo: Apply to the scalp in the shower 3 times weekly, wait five minutes before rinsing. Use 1-2 times weekly for maintenance once clear.

## 2020-09-29 NOTE — PROCEDURE: MEDICATION COUNSELING
Xelrissaz Pregnancy And Lactation Text: This medication is Pregnancy Category D and is not considered safe during pregnancy.  The risk during breast feeding is also uncertain.

## 2020-09-29 NOTE — HPI: RASH (SEBORRHEIC DERMATITIS)
How Severe Is It?: moderate
Is This A New Presentation, Or A Follow-Up?: Rash
Additional History: Patient states that he was diagnosed with \"cradle cap\" at another dermatology office.

## 2021-05-12 NOTE — PROCEDURE: MEDICATION COUNSELING
Yes Enbrel Counseling:  I discussed with the patient the risks of etanercept including but not limited to myelosuppression, immunosuppression, autoimmune hepatitis, demyelinating diseases, lymphoma, and infections.  The patient understands that monitoring is required including a PPD at baseline and must alert us or the primary physician if symptoms of infection or other concerning signs are noted.

## 2025-04-09 NOTE — PROCEDURE: MEDICATION COUNSELING
Chronic  -c/w home lipitor. Minoxidil Counseling: Minoxidil is a topical medication which can increase blood flow where it is applied. It is uncertain how this medication increases hair growth. Side effects are uncommon and include stinging and allergic reactions.